# Patient Record
Sex: MALE | Race: WHITE | NOT HISPANIC OR LATINO | Employment: OTHER | URBAN - METROPOLITAN AREA
[De-identification: names, ages, dates, MRNs, and addresses within clinical notes are randomized per-mention and may not be internally consistent; named-entity substitution may affect disease eponyms.]

---

## 2018-10-08 ENCOUNTER — HOSPITAL ENCOUNTER (EMERGENCY)
Facility: HOSPITAL | Age: 53
Discharge: HOME | End: 2018-10-08
Attending: EMERGENCY MEDICINE
Payer: COMMERCIAL

## 2018-10-08 VITALS
TEMPERATURE: 98 F | DIASTOLIC BLOOD PRESSURE: 103 MMHG | HEIGHT: 70 IN | WEIGHT: 145 LBS | OXYGEN SATURATION: 94 % | HEART RATE: 78 BPM | BODY MASS INDEX: 20.76 KG/M2 | RESPIRATION RATE: 15 BRPM | SYSTOLIC BLOOD PRESSURE: 148 MMHG

## 2018-10-08 DIAGNOSIS — R55 SYNCOPE AND COLLAPSE: ICD-10-CM

## 2018-10-08 DIAGNOSIS — T67.1XXA HEAT SYNCOPE, INITIAL ENCOUNTER: Primary | ICD-10-CM

## 2018-10-08 DIAGNOSIS — E86.0 DEHYDRATION: ICD-10-CM

## 2018-10-08 DIAGNOSIS — R03.0 ELEVATED BLOOD PRESSURE READING: ICD-10-CM

## 2018-10-08 DIAGNOSIS — R52 PAIN: ICD-10-CM

## 2018-10-08 LAB
ALBUMIN SERPL BCP-MCNC: 3.6 G/DL
ALP SERPL-CCNC: 97 U/L
ALT SERPL W/O P-5'-P-CCNC: 25 U/L
ANION GAP SERPL CALC-SCNC: 7 MMOL/L
AST SERPL-CCNC: 32 U/L
BASOPHILS # BLD AUTO: 0.05 K/UL
BASOPHILS NFR BLD: 0.5 %
BILIRUB SERPL-MCNC: 0.5 MG/DL
BILIRUB UR QL STRIP: NEGATIVE
BUN SERPL-MCNC: 12 MG/DL
CALCIUM SERPL-MCNC: 8.3 MG/DL
CHLORIDE SERPL-SCNC: 104 MMOL/L
CK SERPL-CCNC: 413 U/L
CLARITY UR: CLEAR
CO2 SERPL-SCNC: 23 MMOL/L
COLOR UR: YELLOW
CREAT SERPL-MCNC: 0.8 MG/DL
DIFFERENTIAL METHOD: ABNORMAL
EOSINOPHIL # BLD AUTO: 0 K/UL
EOSINOPHIL NFR BLD: 0.4 %
ERYTHROCYTE [DISTWIDTH] IN BLOOD BY AUTOMATED COUNT: 12.2 %
EST. GFR  (AFRICAN AMERICAN): >60 ML/MIN/1.73 M^2
EST. GFR  (NON AFRICAN AMERICAN): >60 ML/MIN/1.73 M^2
GLUCOSE SERPL-MCNC: 103 MG/DL
GLUCOSE UR QL STRIP: NEGATIVE
HCT VFR BLD AUTO: 39.4 %
HGB BLD-MCNC: 13.6 G/DL
HGB UR QL STRIP: ABNORMAL
IMM GRANULOCYTES # BLD AUTO: 0.04 K/UL
IMM GRANULOCYTES NFR BLD AUTO: 0.4 %
KETONES UR QL STRIP: NEGATIVE
LEUKOCYTE ESTERASE UR QL STRIP: NEGATIVE
LYMPHOCYTES # BLD AUTO: 0.9 K/UL
LYMPHOCYTES NFR BLD: 9.5 %
MAGNESIUM SERPL-MCNC: 2 MG/DL
MCH RBC QN AUTO: 32.3 PG
MCHC RBC AUTO-ENTMCNC: 34.5 G/DL
MCV RBC AUTO: 94 FL
MONOCYTES # BLD AUTO: 0.6 K/UL
MONOCYTES NFR BLD: 6.2 %
NEUTROPHILS # BLD AUTO: 8.2 K/UL
NEUTROPHILS NFR BLD: 83 %
NITRITE UR QL STRIP: NEGATIVE
NRBC BLD-RTO: 0 /100 WBC
PH UR STRIP: 5 [PH] (ref 5–8)
PLATELET # BLD AUTO: 232 K/UL
PMV BLD AUTO: 9.9 FL
POTASSIUM SERPL-SCNC: 3.5 MMOL/L
PROT SERPL-MCNC: 6.7 G/DL
PROT UR QL STRIP: NEGATIVE
RBC # BLD AUTO: 4.21 M/UL
SODIUM SERPL-SCNC: 134 MMOL/L
SP GR UR STRIP: <=1.005 (ref 1–1.03)
URN SPEC COLLECT METH UR: ABNORMAL
UROBILINOGEN UR STRIP-ACNC: NEGATIVE EU/DL
WBC # BLD AUTO: 9.89 K/UL

## 2018-10-08 PROCEDURE — 96361 HYDRATE IV INFUSION ADD-ON: CPT

## 2018-10-08 PROCEDURE — 73030 X-RAY EXAM OF SHOULDER: CPT | Mod: 26,LT,, | Performed by: RADIOLOGY

## 2018-10-08 PROCEDURE — 99284 EMERGENCY DEPT VISIT MOD MDM: CPT | Mod: 25

## 2018-10-08 PROCEDURE — 85025 COMPLETE CBC W/AUTO DIFF WBC: CPT

## 2018-10-08 PROCEDURE — 70450 CT HEAD/BRAIN W/O DYE: CPT | Mod: 26,,, | Performed by: RADIOLOGY

## 2018-10-08 PROCEDURE — 82550 ASSAY OF CK (CPK): CPT

## 2018-10-08 PROCEDURE — 70450 CT HEAD/BRAIN W/O DYE: CPT | Mod: TC

## 2018-10-08 PROCEDURE — 93005 ELECTROCARDIOGRAM TRACING: CPT

## 2018-10-08 PROCEDURE — 25000003 PHARM REV CODE 250: Performed by: EMERGENCY MEDICINE

## 2018-10-08 PROCEDURE — 83735 ASSAY OF MAGNESIUM: CPT

## 2018-10-08 PROCEDURE — 80053 COMPREHEN METABOLIC PANEL: CPT

## 2018-10-08 PROCEDURE — 96360 HYDRATION IV INFUSION INIT: CPT

## 2018-10-08 PROCEDURE — 73030 X-RAY EXAM OF SHOULDER: CPT | Mod: TC,FY,LT

## 2018-10-08 PROCEDURE — 81003 URINALYSIS AUTO W/O SCOPE: CPT

## 2018-10-08 RX ORDER — SODIUM CHLORIDE 9 MG/ML
1000 INJECTION, SOLUTION INTRAVENOUS
Status: COMPLETED | OUTPATIENT
Start: 2018-10-08 | End: 2018-10-08

## 2018-10-08 RX ORDER — AMLODIPINE BESYLATE 5 MG/1
5 TABLET ORAL DAILY
Qty: 30 TABLET | Refills: 2 | Status: SHIPPED | OUTPATIENT
Start: 2018-10-08 | End: 2019-10-08

## 2018-10-08 RX ORDER — AMLODIPINE BESYLATE 2.5 MG/1
5 TABLET ORAL
Status: DISCONTINUED | OUTPATIENT
Start: 2018-10-08 | End: 2018-10-08 | Stop reason: HOSPADM

## 2018-10-08 RX ADMIN — SODIUM CHLORIDE 1000 ML: 9 INJECTION, SOLUTION INTRAVENOUS at 03:10

## 2018-10-08 NOTE — DISCHARGE INSTRUCTIONS
CONTINUE current medicines as perscribed TYLENOL and /or MOTRIN for fever and pain as needed  PRESCRIPTIONS should be filled ASAP and take prescriptions as ordered.   PCP follow up ASAP/ or as needed.     Begin Norvasc 5mg daily     Follow up later in the week with Dr Izquierdo    ER if any acute worsening

## 2018-10-08 NOTE — ED NOTES
"Pt here s/p falling to ground with "near syncopal" episode while building a house/carpenty. Pt with gcs of 14 on arrival to ed. Attempting to recall events of the day   "

## 2018-10-08 NOTE — ED NOTES
Patient discharge has been delayed due to pt with increased pain to left shoulder xray ordered and completed waiting on results

## 2018-10-08 NOTE — ED PROVIDER NOTES
Encounter Date: 10/8/2018       History     Chief Complaint   Patient presents with    Seizures     53-year-old white male here with complaint of syncopal event while at work this afternoon.  This patient states that he started to vaguely remember what happened now he thinks that as he turned instead did at that he passed out.  This happened to him once before in 2001 he does work outside in the heat.  Patient complains of vague headache on the left side and being dizzy.          Review of patient's allergies indicates:  No Known Allergies  No past medical history on file.  History reviewed. No pertinent surgical history.  History reviewed. No pertinent family history.  Social History     Tobacco Use    Smoking status: Never Smoker    Smokeless tobacco: Never Used   Substance Use Topics    Alcohol use: Not on file    Drug use: No     Review of Systems   Neurological: Positive for dizziness and syncope.   All other systems reviewed and are negative.      Physical Exam     Initial Vitals [10/08/18 1445]   BP Pulse Resp Temp SpO2   (!) 120/94 (!) 120 20 98.1 °F (36.7 °C) (!) 89 %      MAP       --         Physical Exam    Nursing note and vitals reviewed.  Constitutional: He appears well-developed and well-nourished.   HENT:   Head: Normocephalic.   Right Ear: External ear normal.   Left Ear: External ear normal.   Nose: Nose normal.   Mouth/Throat: Oropharynx is clear and moist.   Eyes: Conjunctivae and EOM are normal. Pupils are equal, round, and reactive to light.   Neck: Normal range of motion. Neck supple.   Cardiovascular: Normal rate, regular rhythm, normal heart sounds and intact distal pulses.   No murmur heard.  Pulmonary/Chest: Breath sounds normal. He has no wheezes. He has no rhonchi. He has no rales.   Abdominal: Soft. Bowel sounds are normal. He exhibits no distension and no mass. There is no tenderness. There is no rebound.   Musculoskeletal: Normal range of motion.   Neurological: He is alert and  oriented to person, place, and time. He has normal strength and normal reflexes. He displays normal reflexes. No cranial nerve deficit.   Skin: Skin is warm and dry. Capillary refill takes less than 2 seconds.   Psychiatric: He has a normal mood and affect. His behavior is normal. Judgment and thought content normal.         ED Course   Procedures  Labs Reviewed - No data to display  EKG Readings: (Independently Interpreted)   Rhythm: Normal Sinus Rhythm. Heart Rate: 109. Ectopy: No Ectopy. Conduction: Normal. ST Segments: Normal ST Segments. T Waves: Normal. Clinical Impression: Normal Sinus Rhythm and Sinus Tachycardia       Imaging Results          CT Head Without Contrast (Final result)  Result time 10/08/18 15:19:34    Final result by Tommy De La O MD (10/08/18 15:19:34)                 Impression:      1. Cortical atrophy with periventricular deep white matter change consistent with chronic small vessel ischemic disease.  This is considered advanced for a patient of this age group.  Correlate clinically with past medical history for hypertension and/or diabetes.  2. Small focus of remote ischemia involving the deep white matter of the right frontal lobe.  3. Mild ethmoid sinus disease.      Electronically signed by: Tommy De La O  Date:    10/08/2018  Time:    15:19             Narrative:    EXAMINATION:  CT HEAD WITHOUT CONTRAST    CLINICAL HISTORY:  Dizziness;    TECHNIQUE:  Low dose axial images were obtained through the head.  Coronal and sagittal reformations were also performed. Contrast was not administered.    COMPARISON:  None.    FINDINGS:  There is no acute hemorrhage or infarction.  There is cortical atrophy.  There are periventricular deep white matter changes consistent with chronic small vessel ischemic disease.  7 mm focus of hypoattenuation within the deep white matter of the right frontal lobe consistent with a small focus of remote ischemia.    No extra-axial fluid collections.   Ventricles are normal in size, shape and configuration.  The basal cisterns are patent.    The imaged paranasal sinuses are well aerated.  Scattered mucoperiosteal thickening within the right greater than left ethmoid air cells.    The mastoid air cells and middle ears are normally pneumatized.                                 Medical Decision Making:   ED Management:  Patient has  improved with treatment in the emergency department and comfortable going home.I have discussed reasons to return and importance of followup.  Patient understands that the emergency visit today is primarily to address immediate concerns and to rule out emergent cause of symptoms and that they may require further workup and evaluation as an outpatient. All questions addressed and patient given discharge instructions and followup information.                    ED Course as of Oct 11 0620   Mon Oct 08, 2018   1714 3v left shoulder reveal normal bony anatomy no fracture dislocation    KLG  [KG]      ED Course User Index  [KG] Fritz Michel MD     Clinical Impression:   The primary encounter diagnosis was Heat syncope, initial encounter. A diagnosis of Dehydration was also pertinent to this visit.                             Lan Kirkland MD  10/08/18 1631       Lan Kirkland MD  10/11/18 0620

## 2018-10-08 NOTE — ED NOTES
Patient discharge has been delayed due to dr mcneill into examine pt s/p xray, spouse requesting blood work on pt pt placed back on cardiac monitor and plan to evaluate labs

## 2018-10-09 NOTE — ED NOTES
DISCHARGE INSTRUCTIONS GIVEN, DISCUSSED MEDICATIONS AND FOLLOW UP CARE, PATIENT VERBALIZED UNDERSTANDING, NO QUESTIONS OR COMPLAINTS AT TIME, ENCOURAGED TO RETURN FOR NEW OR WORSENING SYMPTOMS. PATIENT ESCORTED TO REGISTRATION W/O ROSIE. IRIS SALVADOR RN

## 2018-10-22 ENCOUNTER — TELEPHONE (OUTPATIENT)
Dept: ORTHOPEDICS | Facility: CLINIC | Age: 53
End: 2018-10-22

## 2018-10-22 NOTE — TELEPHONE ENCOUNTER
Called patient to schedule referred appointment from Dr. Izquierdo with Dr. Mcallister for treatment of left shoulder pain. Appointment made and patient voiced understanding of appointment date, time, and location.

## 2018-10-31 ENCOUNTER — OFFICE VISIT (OUTPATIENT)
Dept: ORTHOPEDICS | Facility: CLINIC | Age: 53
End: 2018-10-31
Payer: COMMERCIAL

## 2018-10-31 VITALS
HEART RATE: 115 BPM | BODY MASS INDEX: 20.77 KG/M2 | HEIGHT: 70 IN | SYSTOLIC BLOOD PRESSURE: 137 MMHG | WEIGHT: 145.06 LBS | DIASTOLIC BLOOD PRESSURE: 100 MMHG

## 2018-10-31 DIAGNOSIS — M75.100 TEAR OF ROTATOR CUFF, UNSPECIFIED LATERALITY, UNSPECIFIED TEAR EXTENT: Primary | ICD-10-CM

## 2018-10-31 DIAGNOSIS — M19.012 ARTHRITIS OF LEFT ACROMIOCLAVICULAR JOINT: ICD-10-CM

## 2018-10-31 DIAGNOSIS — M75.122 COMPLETE TEAR OF LEFT ROTATOR CUFF: Primary | ICD-10-CM

## 2018-10-31 PROCEDURE — 99204 OFFICE O/P NEW MOD 45 MIN: CPT | Mod: 25,S$GLB,, | Performed by: ORTHOPAEDIC SURGERY

## 2018-10-31 PROCEDURE — 99999 PR PBB SHADOW E&M-EST. PATIENT-LVL III: CPT | Mod: PBBFAC,,, | Performed by: ORTHOPAEDIC SURGERY

## 2018-10-31 PROCEDURE — 20610 DRAIN/INJ JOINT/BURSA W/O US: CPT | Mod: LT,S$GLB,, | Performed by: ORTHOPAEDIC SURGERY

## 2018-10-31 RX ORDER — PANTOPRAZOLE SODIUM 40 MG/1
TABLET, DELAYED RELEASE ORAL
COMMUNITY
Start: 2018-10-09

## 2018-10-31 RX ADMIN — TRIAMCINOLONE ACETONIDE 40 MG: 40 INJECTION, SUSPENSION INTRA-ARTICULAR; INTRAMUSCULAR at 02:10

## 2018-11-01 PROBLEM — M75.122 COMPLETE TEAR OF LEFT ROTATOR CUFF: Status: ACTIVE | Noted: 2018-11-01

## 2018-11-01 PROBLEM — M19.012 ARTHRITIS OF LEFT ACROMIOCLAVICULAR JOINT: Status: ACTIVE | Noted: 2018-11-01

## 2018-11-01 RX ORDER — TRIAMCINOLONE ACETONIDE 40 MG/ML
40 INJECTION, SUSPENSION INTRA-ARTICULAR; INTRAMUSCULAR
Status: DISCONTINUED | OUTPATIENT
Start: 2018-10-31 | End: 2018-11-01 | Stop reason: HOSPADM

## 2018-11-01 NOTE — PROGRESS NOTES
Subjective:      Patient ID: Drew Kaur is a 53 y.o. male.    Chief Complaint: Pain of the Left Shoulder    Referring Provider: No referring provider defined for this encounter.    HPI:  Mr. Kaur is a 53-year-old right-hand-dominant male who presented today with complaints of 3 weeks of left shoulder pain and decreased motion after he blacked out and when he awoke he had shoulder pain and loss of motion.  His date of injury 10/08/2018.  Attempts at abduction increases symptoms while nothing improves it.  His symptoms awaken him at night.  He has taken NSAIDs without help.  He has not done physical therapy nor had injections.    Past Medical History:   Diagnosis Date    GERD (gastroesophageal reflux disease)      *  *  * Hypertension  Seasonal allergies  Mini strokes  Headache      Past Surgical History:   Procedure Laterality Date     *  * EYE SURGERY  SKIN CANCER REMOVAL FROM UNDER RIGHT EYE  INCOMPLETE AMPUTATION LEFT INDEX FINGER         Review of patient's allergies indicates:  No Known Allergies    Social History     Occupational History         Tobacco Use    Smoking status: Never Smoker    Smokeless tobacco: Never Used   Substance and Sexual Activity    Alcohol use: Yes     Frequency: Never     Comment: Socially    Drug use: No    Sexual activity: Not Currently      Family History   Problem Relation Age of Onset    Diabetes Mother     Hypertension Mother     Stroke Father     Heart attack Father     No Known Problems Sister     Hypertension Brother     No Known Problems Sister     No Known Problems Sister     Arthritis Brother     No Known Problems Brother        Previous Hospitalizations:  Denied previous hospitalizations.    ROS:   Review of Systems   Constitution: Negative for chills and fever.   HENT: Negative for congestion.    Eyes: Negative for blurred vision.   Cardiovascular: Negative for chest pain.   Respiratory: Negative for cough.    Endocrine:  Negative for polydipsia.   Skin: Positive for skin cancer.   Gastrointestinal: Negative for constipation and diarrhea.   Genitourinary: Negative for dysuria.   Neurological: Negative for numbness.   Psychiatric/Behavioral: Negative for substance abuse.   Allergic/Immunologic: Positive for environmental allergies.           Objective:      Physical Exam:   General: AAOx3.  No acute distress  HEENT: Normocephalic, PEARLA EOMI, Good Dentition  Neck: Supple, No JVD  Chest: Symetric, equal excursion on inspiration  Abdomen: Soft NTND  Vascular:  Pulses intact and equal bilaterally.  Capillary refill less than 3 seconds and equal bilaterally  Neurologic:  Pinprick and soft touch intact and equal bilaterally  Integment:  No ecchymosis, no errythema  Extremity:  Shoulder:  Forward flexion/abduction right shoulder 0/179 degrees, left shoulder 0/75°.  Internal rotation right shoulder L1, left shoulder L5.  Negative lift-off bilaterally. External rotation equal bilaterally 0/15°.  Full can positive left shoulder.  Empty can positive left shoulder.  Mild drop-arm left shoulder.  Gutierrez/Neer positive left shoulder.  Cross-arm negative both shoulders.  Nontender over the acromioclavicular joint both shoulders.  Mild tenderness in the bicipital groove left shoulder.  Yergason's negative bilaterally. Apprehension/relocation negative bilaterally.  Radiography:  Personally reviewed x-rays of the left shoulder completed on 10/08/2018 which showed elevation of the humerus in the glenoid with AC arthritis and a type 3 acromion.      Assessment:       Impression:      1. Complete tear of left rotator cuff    2. Arthritis of left acromioclavicular joint          Plan:       1.  Discussed physical examination and radiographic findings with the patient. Drew understands that he appears to have a tear of his rotator cuff and in order to fully evaluated would like to forward him for an MRI.  He also understands that most likely surgery  will be recommended for his shoulder.  2.  Refer for an MRI of the left shoulder.  3.  Final recommendations after MRI is completed.  4.  To help palliate some of his pain offered a steroid injection to the left shoulder, he elected to proceed.  5.  One-handed activities with the right hand only.  6.  Will hold off on referral to physical therapy as he will need it for postoperative convalescence.  7.  Home exercises to include Codman exercises, wall walking exercises, towel exercises, and cane exercises were shown discussed.  8.  Any minor pain can be treated with over-the-counter medications dosed per box instructions.  9.  Follow up after MRI is completed.

## 2018-11-01 NOTE — PROCEDURES
Large Joint Aspiration/Injection: L glenohumeral  Date/Time: 10/31/2018 2:15 PM  Performed by: Wyatt Mcallister DO  Authorized by: Wyatt Mcallister, DO     Consent Done?:  Yes (Verbal)  Indications:  Pain and diagnostic evaluation  Procedure site marked: Yes    Timeout: Prior to procedure the correct patient, procedure, and site was verified      Location:  Shoulder  Site:  L glenohumeral  Prep: Patient was prepped and draped in usual sterile fashion    Ultrasonic Guidance for needle placement: No  Needle size:  22 G  Approach:  Posterior  Medications:  40 mg triamcinolone acetonide 40 mg/mL  Patient tolerance:  Patient tolerated the procedure well with no immediate complications

## 2018-11-07 ENCOUNTER — HOSPITAL ENCOUNTER (OUTPATIENT)
Dept: RADIOLOGY | Facility: HOSPITAL | Age: 53
Discharge: HOME | End: 2018-11-07
Attending: ORTHOPAEDIC SURGERY
Payer: COMMERCIAL

## 2018-11-07 DIAGNOSIS — M75.100 TEAR OF ROTATOR CUFF, UNSPECIFIED LATERALITY, UNSPECIFIED TEAR EXTENT: ICD-10-CM

## 2018-11-07 PROCEDURE — 73222 MRI JOINT UPR EXTREM W/DYE: CPT | Mod: TC,LT

## 2018-11-07 PROCEDURE — 73040 CONTRAST X-RAY OF SHOULDER: CPT | Mod: TC

## 2018-11-07 PROCEDURE — 73040 CONTRAST X-RAY OF SHOULDER: CPT | Mod: 26,LT,, | Performed by: RADIOLOGY

## 2018-11-07 PROCEDURE — 25500020 PHARM REV CODE 255

## 2018-11-07 PROCEDURE — 23350 INJECTION FOR SHOULDER X-RAY: CPT | Mod: LT,,, | Performed by: RADIOLOGY

## 2018-11-07 PROCEDURE — A9577 INJ MULTIHANCE: HCPCS

## 2018-11-07 PROCEDURE — 73222 MRI JOINT UPR EXTREM W/DYE: CPT | Mod: 26,LT,, | Performed by: RADIOLOGY

## 2018-11-07 PROCEDURE — 23350 INJECTION FOR SHOULDER X-RAY: CPT

## 2018-11-07 PROCEDURE — 25000003 PHARM REV CODE 250

## 2018-11-07 RX ORDER — LIDOCAINE HYDROCHLORIDE 10 MG/ML
INJECTION, SOLUTION EPIDURAL; INFILTRATION; INTRACAUDAL; PERINEURAL
Status: COMPLETED
Start: 2018-11-07 | End: 2018-11-07

## 2018-11-07 RX ADMIN — GADOBENATE DIMEGLUMINE 0.15 ML: 529 INJECTION, SOLUTION INTRAVENOUS at 01:11

## 2018-11-07 RX ADMIN — LIDOCAINE HYDROCHLORIDE 5 ML: 10 INJECTION, SOLUTION EPIDURAL; INFILTRATION; INTRACAUDAL; PERINEURAL at 01:11

## 2018-11-07 RX ADMIN — IOHEXOL 10 ML: 350 INJECTION, SOLUTION INTRAVENOUS at 01:11

## 2018-11-14 ENCOUNTER — OFFICE VISIT (OUTPATIENT)
Dept: ORTHOPEDICS | Facility: CLINIC | Age: 53
End: 2018-11-14
Payer: COMMERCIAL

## 2018-11-14 VITALS — WEIGHT: 145.06 LBS | HEIGHT: 70 IN | BODY MASS INDEX: 20.77 KG/M2

## 2018-11-14 DIAGNOSIS — M75.122 COMPLETE TEAR OF LEFT ROTATOR CUFF: Primary | ICD-10-CM

## 2018-11-14 DIAGNOSIS — M19.012 ARTHRITIS OF LEFT ACROMIOCLAVICULAR JOINT: ICD-10-CM

## 2018-11-14 PROCEDURE — 99213 OFFICE O/P EST LOW 20 MIN: CPT | Mod: S$GLB,,, | Performed by: ORTHOPAEDIC SURGERY

## 2018-11-14 PROCEDURE — 99999 PR PBB SHADOW E&M-EST. PATIENT-LVL II: CPT | Mod: PBBFAC,,, | Performed by: ORTHOPAEDIC SURGERY

## 2018-11-14 NOTE — PROGRESS NOTES
Subjective:      Patient ID: Drew Kaur is a 53 y.o. male.    Chief Complaint: Results (MRI Results of Left Shoulder)    HPI: Mr. Kaur returns today for review of MRI of his left shoulder.  At his last visit on 10/31/2018 he appeared to have ruptured the rotator cuff after he had a blackout episode and fell.  He stated that his shoulder is feeling much better today and he has regained a lot of his motion. His pain is well controlled.    ROS:  No new diagnosis/surgery/prescriptions since last office visit on 10/31/2018.  Constitution: Negative for chills and fever.   HENT: Negative for congestion.    Eyes: Negative for blurred vision.   Cardiovascular: Negative for chest pain.   Respiratory: Negative for cough.    Endocrine: Negative for polydipsia.   Skin: Positive for skin cancer.   Gastrointestinal: Negative for constipation and diarrhea.   Genitourinary: Negative for dysuria.   Neurological: Negative for numbness.   Psychiatric/Behavioral: Negative for substance abuse.   Allergic/Immunologic: Positive for environmental allergies.       Objective:      Physical Exam:   General: AAOx3.  No acute distress  Vascular:  Pulses intact and equal bilaterally.  Capillary refill less than 3 seconds and equal bilaterally  Neurologic:  Pinprick and soft touch intact and equal bilaterally  Integment:  No ecchymosis, no errythema  Extremity:  Shoulder:  Forward flexion/abduction equal bilaterally 0/175°.  Internal rotation equal bilaterally L1.  External rotation equal bilaterally 0/15 degrees. Full can negative both shoulders.  Empty can positive left shoulder.  Gutierrez/Neer mildly positive left shoulder.  Nontender in the bicipital groove both shoulders.  Apprehension/relocation negative both shoulders.  Radiography:  Personally reviewed MRI of the left shoulder completed on 11/07/2018 which showed a full-thickness retracted tear of the rotator cuff and AC arthritis.      Assessment:       Impression:   1.  Rotator  cuff tear, left shoulder.  2.  AC arthritis, left shoulder.      Plan:       1.  Discussed physical examination and radiographic findings with the patient. Drew understands that he has a full-thickness tear of the rotator cuff of his left shoulder and typical recommendations are for surgical repair. The patient understands that is typically a 3 to four-month process. Discussed in detail with the patient surgery and offered him to proceed with surgery. The patient declined stating that he could not take off work to proceed with surgery. He stated he is doing well and would prefer to try other treatment modalities.  Explained to the patient that the longer he waits the harder it is to do a rotator cuff repair and over time he could develop a rotator cuff arthropathy/arthritis of his shoulder.  He stated he would prefer to continue with conservative measures.  2.  Offered to proceed with surgery he declined.  3.  Refer to physical therapy/occupational therapy.  4.  Home exercises were reinforced with the patient to include rotator cuff and deltoid incorporation exercises.  5.  Any pain can be controlled with over-the-counter medications dosed per box instructions.  6.  Follow up in 6 weeks for re-evaluation will really entertain rotator cuff repair again at that time.

## 2019-05-02 ENCOUNTER — HOSPITAL ENCOUNTER (EMERGENCY)
Facility: HOSPITAL | Age: 54
Discharge: HOME | End: 2019-05-02
Attending: INTERNAL MEDICINE
Payer: COMMERCIAL

## 2019-05-02 VITALS
BODY MASS INDEX: 20.04 KG/M2 | TEMPERATURE: 98 F | HEART RATE: 98 BPM | HEIGHT: 70 IN | RESPIRATION RATE: 20 BRPM | DIASTOLIC BLOOD PRESSURE: 81 MMHG | WEIGHT: 140 LBS | SYSTOLIC BLOOD PRESSURE: 124 MMHG

## 2019-05-02 DIAGNOSIS — S41.111A LACERATION OF ARM, RIGHT, COMPLICATED, INITIAL ENCOUNTER: Primary | ICD-10-CM

## 2019-05-02 DIAGNOSIS — S61.412A LACERATION OF LEFT HAND WITHOUT FOREIGN BODY, INITIAL ENCOUNTER: ICD-10-CM

## 2019-05-02 PROCEDURE — 99284 EMERGENCY DEPT VISIT MOD MDM: CPT | Mod: 25

## 2019-05-02 PROCEDURE — 25000003 PHARM REV CODE 250: Performed by: PHYSICIAN ASSISTANT

## 2019-05-02 PROCEDURE — 73130 X-RAY EXAM OF HAND: CPT | Mod: 26,LT,, | Performed by: RADIOLOGY

## 2019-05-02 PROCEDURE — 73130 XR HAND COMPLETE 3 VIEW LEFT: ICD-10-PCS | Mod: 26,LT,, | Performed by: RADIOLOGY

## 2019-05-02 PROCEDURE — 12004 RPR S/N/AX/GEN/TRK7.6-12.5CM: CPT

## 2019-05-02 PROCEDURE — 12001 RPR S/N/AX/GEN/TRNK 2.5CM/<: CPT | Mod: 59,LT

## 2019-05-02 PROCEDURE — 12032 INTMD RPR S/A/T/EXT 2.6-7.5: CPT | Mod: RT

## 2019-05-02 PROCEDURE — 73130 X-RAY EXAM OF HAND: CPT | Mod: TC,FY,LT

## 2019-05-02 PROCEDURE — 63600175 PHARM REV CODE 636 W HCPCS: Performed by: PHYSICIAN ASSISTANT

## 2019-05-02 PROCEDURE — 96372 THER/PROPH/DIAG INJ SC/IM: CPT

## 2019-05-02 RX ORDER — LIDOCAINE HYDROCHLORIDE 10 MG/ML
5 INJECTION INFILTRATION; PERINEURAL
Status: COMPLETED | OUTPATIENT
Start: 2019-05-02 | End: 2019-05-02

## 2019-05-02 RX ORDER — CEPHALEXIN 500 MG/1
500 CAPSULE ORAL 4 TIMES DAILY
Qty: 20 CAPSULE | Refills: 0 | Status: SHIPPED | OUTPATIENT
Start: 2019-05-02 | End: 2019-05-07

## 2019-05-02 RX ORDER — CEFTRIAXONE 1 G/1
1 INJECTION, POWDER, FOR SOLUTION INTRAMUSCULAR; INTRAVENOUS
Status: COMPLETED | OUTPATIENT
Start: 2019-05-02 | End: 2019-05-02

## 2019-05-02 RX ADMIN — CEFTRIAXONE SODIUM 1 G: 1 INJECTION, POWDER, FOR SOLUTION INTRAMUSCULAR; INTRAVENOUS at 06:05

## 2019-05-02 RX ADMIN — LIDOCAINE HYDROCHLORIDE 5 ML: 10 INJECTION, SOLUTION INFILTRATION; PERINEURAL at 05:05

## 2019-05-02 NOTE — ED NOTES
Wound irrigated with 500ml NS/Betadine solution to right forearm due to depth of laceration.  Provider at bedside for wound repair.  After suture/staples, wound clean and dressing applied.

## 2019-05-02 NOTE — ED PROVIDER NOTES
Encounter Date: 5/2/2019       History     Chief Complaint   Patient presents with    Laceration     right forearm and left index finger, cut with      Patient presents to ER with laceration to right forearm as well as left dorsal portion of the hand.  Patient states was cut with a  did not realize it was still on and when he planted in the  cut his forearm when he went to grab the  it hit the top of his left hand over the 1st knuckle of the 1st finger.  Patient states is in mild pain is sharp in nature does not radiate stated is not tried any medications over-the-counter for pain prior to coming in.  Patient denied ever having this type of an injury. Patient stated is up-to-date on his tetanus stated has been less than 5 years since last booster.  Stated the injury happened while at home.    The history is provided by the patient.     Review of patient's allergies indicates:  No Known Allergies  Past Medical History:   Diagnosis Date    GERD (gastroesophageal reflux disease)     Hypertension      Past Surgical History:   Procedure Laterality Date    EYE SURGERY       Family History   Problem Relation Age of Onset    Diabetes Mother     Hypertension Mother     Stroke Father     Heart attack Father     No Known Problems Sister     Hypertension Brother     No Known Problems Sister     No Known Problems Sister     Arthritis Brother     No Known Problems Brother      Social History     Tobacco Use    Smoking status: Never Smoker    Smokeless tobacco: Never Used   Substance Use Topics    Alcohol use: Yes     Frequency: Never     Comment: Socially    Drug use: No     Review of Systems   Constitutional: Negative for chills, diaphoresis and fever.   Respiratory: Negative for shortness of breath.    Cardiovascular: Negative for chest pain.   Gastrointestinal: Negative for nausea and vomiting.   Musculoskeletal: Negative for gait problem.   Skin: Positive for wound (Laceration to  right forearm and left hand). Negative for rash.   Neurological: Negative for syncope, weakness, light-headedness and numbness.   All other systems reviewed and are negative.      Physical Exam     Initial Vitals [05/02/19 1709]   BP Pulse Resp Temp SpO2   124/81 98 20 98.3 °F (36.8 °C) --      MAP       --         Physical Exam    Nursing note and vitals reviewed.  Constitutional: He appears well-developed and well-nourished. He is not diaphoretic. No distress.   HENT:   Head: Atraumatic.   Eyes: Right eye exhibits no discharge. Left eye exhibits no discharge.   Neck: Normal range of motion.   Cardiovascular: Normal rate, regular rhythm and intact distal pulses.   Pulmonary/Chest: No respiratory distress.   Musculoskeletal: Normal range of motion. He exhibits tenderness (Pain surrounding laceration to right forearm as well as left hand moderate swelling just distal to the laceration on investigation patient cut into the fascial plane of the forearm and exposed muscle is visible).        Arms:  Neurological: He is alert and oriented to person, place, and time. GCS score is 15. GCS eye subscore is 4. GCS verbal subscore is 5. GCS motor subscore is 6.   Skin: Skin is warm and dry. Capillary refill takes less than 2 seconds.   Psychiatric: He has a normal mood and affect. Thought content normal.         ED Course   Lac Repair  Date/Time: 5/2/2019 6:20 PM  Performed by: TERESITA Benitez  Authorized by: Geraldo Back MD   Consent Done: Yes  Consent: Verbal consent obtained.  Risks and benefits: risks, benefits and alternatives were discussed  Consent given by: patient  Patient understanding: patient states understanding of the procedure being performed  Body area: upper extremity  Location details: right lower arm  Laceration length: 6 cm  Foreign bodies: no foreign bodies  Tendon involvement: none  Nerve involvement: none  Vascular damage: no  Anesthesia: local infiltration    Anesthesia:  Local  Anesthetic: lidocaine 1% without epinephrine  Anesthetic total: 4 mL  Patient sedated: no  Preparation: Patient was prepped and draped in the usual sterile fashion.  Irrigation solution: Copious irrigation with saline and Betadine solution.  Irrigation method: jet lavage  Amount of cleaning: extensive  Debridement: none  Degree of undermining: extensive (Extends to fascial plane)  Skin closure: 3-0 nylon  Fascia closure: 4-0 Vicryl  Number of sutures: 10 (Six external for internal)  Technique: simple  Approximation: close  Approximation difficulty: simple  Dressing: non-stick sterile dressing  Patient tolerance: Patient tolerated the procedure well with no immediate complications    Lac Repair  Date/Time: 5/2/2019 6:24 PM  Performed by: TERESITA Benitez  Authorized by: Geraldo Back MD   Consent Done: Yes  Consent: Verbal consent obtained.  Risks and benefits: risks, benefits and alternatives were discussed  Consent given by: patient  Patient understanding: patient states understanding of the procedure being performed  Body area: upper extremity  Location details: left hand  Laceration length: 2.5 cm  Foreign bodies: no foreign bodies  Tendon involvement: none  Nerve involvement: none  Vascular damage: no  Anesthesia: local infiltration    Anesthesia:  Local Anesthetic: lidocaine 1% without epinephrine  Anesthetic total: 2 mL  Patient sedated: no  Preparation: Patient was prepped and draped in the usual sterile fashion.  Irrigation solution: saline (Saline with Betadine)  Amount of cleaning: standard  Debridement: none  Degree of undermining: none  Skin closure: 4-0 nylon  Number of sutures: 5  Technique: simple  Approximation: close  Approximation difficulty: simple  Dressing: non-stick sterile dressing  Patient tolerance: Patient tolerated the procedure well with no immediate complications        Labs Reviewed - No data to display       Imaging Results          X-Ray Hand 3 view Left (In process)                X-Rays:   Independently Interpreted Readings:   Other Readings:  No fractures or foreign bodies seen    Medical Decision Making:   Differential Diagnosis:   Fractures foreign bodies laceration muscle laceration                      Clinical Impression:       ICD-10-CM ICD-9-CM   1. Laceration of arm, right, complicated, initial encounter S41.111A 884.1   2. Laceration of left hand without foreign body, initial encounter S61.412A 882.0                                TERESITA Benitez  05/02/19 1835

## 2019-05-02 NOTE — DISCHARGE INSTRUCTIONS
Change Bandage starting tomorrow morning twice daily wash with soap and water with each dressing change an apply triple antibiotic with each dressing changes well. Keep wound covered if chance of getting dirty otherwise may leave wound open to air.  May take Tylenol as needed for pain. Be sure to finish all antibiotics as prescribed follow-up with primary care in 10 days for suture removal.

## 2020-02-11 ENCOUNTER — TELEPHONE (OUTPATIENT)
Dept: SURGERY | Facility: CLINIC | Age: 55
End: 2020-02-11

## 2020-02-11 NOTE — TELEPHONE ENCOUNTER
LVM for patient to return call concerning referral sent from Dr Ishmael Izquierdo to schedule consult with Dr Vazquez.

## 2020-02-14 ENCOUNTER — TELEPHONE (OUTPATIENT)
Dept: SURGERY | Facility: CLINIC | Age: 55
End: 2020-02-14

## 2020-02-14 NOTE — TELEPHONE ENCOUNTER
LVM for patient to return call to schedule consult with Dr Matt Vazquez for referral from Dr Izquierdo

## 2020-03-03 ENCOUNTER — TELEPHONE (OUTPATIENT)
Dept: SURGERY | Facility: CLINIC | Age: 55
End: 2020-03-03

## 2020-03-03 NOTE — TELEPHONE ENCOUNTER
LVM for patient to return call to schedule consult with Dr Matt Vazquez for referral from Dr Izquierdo. 3rd attempt to call.

## 2021-03-02 ENCOUNTER — OFFICE VISIT (OUTPATIENT)
Dept: CARDIOLOGY | Facility: CLINIC | Age: 56
End: 2021-03-02
Payer: COMMERCIAL

## 2021-03-02 VITALS
DIASTOLIC BLOOD PRESSURE: 76 MMHG | HEART RATE: 116 BPM | HEIGHT: 70 IN | RESPIRATION RATE: 16 BRPM | SYSTOLIC BLOOD PRESSURE: 128 MMHG | WEIGHT: 141 LBS | OXYGEN SATURATION: 98 % | BODY MASS INDEX: 20.19 KG/M2

## 2021-03-02 DIAGNOSIS — I08.0 MITRAL VALVE INSUFFICIENCY AND AORTIC VALVE INSUFFICIENCY: ICD-10-CM

## 2021-03-02 DIAGNOSIS — I63.81 RIGHT-SIDED LACUNAR INFARCTION: ICD-10-CM

## 2021-03-02 DIAGNOSIS — Z86.73 H/O: CVA (CEREBROVASCULAR ACCIDENT): ICD-10-CM

## 2021-03-02 DIAGNOSIS — R07.89 ATYPICAL CHEST PAIN: ICD-10-CM

## 2021-03-02 DIAGNOSIS — R94.39 ABNORMAL FINDING ON CARDIOVASCULAR STRESS TEST: ICD-10-CM

## 2021-03-02 DIAGNOSIS — R94.31 NONSPECIFIC ABNORMAL ELECTROCARDIOGRAM (ECG) (EKG): Primary | ICD-10-CM

## 2021-03-02 DIAGNOSIS — Q23.1 BICUSPID AORTIC VALVE: ICD-10-CM

## 2021-03-02 DIAGNOSIS — R00.0 SINUS TACHYCARDIA: ICD-10-CM

## 2021-03-02 PROBLEM — Q23.81 BICUSPID AORTIC VALVE: Status: ACTIVE | Noted: 2021-03-02

## 2021-03-02 PROBLEM — Z00.00 ANNUAL PHYSICAL EXAM: Status: ACTIVE | Noted: 2021-03-02

## 2021-03-02 PROCEDURE — 93000 ELECTROCARDIOGRAM COMPLETE: CPT | Mod: S$GLB,,, | Performed by: INTERNAL MEDICINE

## 2021-03-02 PROCEDURE — 3008F PR BODY MASS INDEX (BMI) DOCUMENTED: ICD-10-PCS | Mod: CPTII,S$GLB,, | Performed by: INTERNAL MEDICINE

## 2021-03-02 PROCEDURE — 99214 OFFICE O/P EST MOD 30 MIN: CPT | Mod: 25,S$GLB,, | Performed by: INTERNAL MEDICINE

## 2021-03-02 PROCEDURE — 93000 EKG 12-LEAD: ICD-10-PCS | Mod: S$GLB,,, | Performed by: INTERNAL MEDICINE

## 2021-03-02 PROCEDURE — 3008F BODY MASS INDEX DOCD: CPT | Mod: CPTII,S$GLB,, | Performed by: INTERNAL MEDICINE

## 2021-03-02 PROCEDURE — 1126F PR PAIN SEVERITY QUANTIFIED, NO PAIN PRESENT: ICD-10-PCS | Mod: S$GLB,,, | Performed by: INTERNAL MEDICINE

## 2021-03-02 PROCEDURE — 99214 PR OFFICE/OUTPT VISIT, EST, LEVL IV, 30-39 MIN: ICD-10-PCS | Mod: 25,S$GLB,, | Performed by: INTERNAL MEDICINE

## 2021-03-02 PROCEDURE — 1126F AMNT PAIN NOTED NONE PRSNT: CPT | Mod: S$GLB,,, | Performed by: INTERNAL MEDICINE

## 2021-03-02 RX ORDER — ASPIRIN 81 MG/1
81 TABLET ORAL DAILY
COMMUNITY

## 2021-03-02 RX ORDER — METOPROLOL SUCCINATE 25 MG/1
25 TABLET, EXTENDED RELEASE ORAL DAILY
Qty: 30 TABLET | Refills: 3 | Status: SHIPPED | OUTPATIENT
Start: 2021-03-02 | End: 2021-05-25

## 2021-03-23 ENCOUNTER — IMMUNIZATION (OUTPATIENT)
Dept: FAMILY MEDICINE | Facility: CLINIC | Age: 56
End: 2021-03-23
Payer: COMMERCIAL

## 2021-03-23 DIAGNOSIS — Z23 NEED FOR VACCINATION: Primary | ICD-10-CM

## 2021-03-23 PROCEDURE — 0011A COVID-19, MRNA, LNP-S, PF, 100 MCG/0.5 ML DOSE VACCINE: CPT | Mod: PBBFAC | Performed by: FAMILY MEDICINE

## 2021-04-20 ENCOUNTER — IMMUNIZATION (OUTPATIENT)
Dept: FAMILY MEDICINE | Facility: CLINIC | Age: 56
End: 2021-04-20
Payer: COMMERCIAL

## 2021-04-20 DIAGNOSIS — Z23 NEED FOR VACCINATION: Primary | ICD-10-CM

## 2021-04-20 PROCEDURE — 0012A COVID-19, MRNA, LNP-S, PF, 100 MCG/0.5 ML DOSE VACCINE: ICD-10-PCS | Mod: ,,, | Performed by: FAMILY MEDICINE

## 2021-04-20 PROCEDURE — 0012A COVID-19, MRNA, LNP-S, PF, 100 MCG/0.5 ML DOSE VACCINE: CPT | Mod: ,,, | Performed by: FAMILY MEDICINE

## 2021-04-20 PROCEDURE — 91301 COVID-19, MRNA, LNP-S, PF, 100 MCG/0.5 ML DOSE VACCINE: ICD-10-PCS | Mod: ,,, | Performed by: FAMILY MEDICINE

## 2021-04-20 PROCEDURE — 91301 COVID-19, MRNA, LNP-S, PF, 100 MCG/0.5 ML DOSE VACCINE: CPT | Mod: ,,, | Performed by: FAMILY MEDICINE

## 2021-04-21 DIAGNOSIS — M25.461 PAIN AND SWELLING OF RIGHT KNEE: Primary | ICD-10-CM

## 2021-04-21 DIAGNOSIS — M25.561 PAIN AND SWELLING OF RIGHT KNEE: Primary | ICD-10-CM

## 2021-05-14 ENCOUNTER — HOSPITAL ENCOUNTER (OUTPATIENT)
Dept: RADIOLOGY | Facility: HOSPITAL | Age: 56
Discharge: HOME | End: 2021-05-14
Attending: FAMILY MEDICINE
Payer: COMMERCIAL

## 2021-05-14 DIAGNOSIS — M25.461 PAIN AND SWELLING OF RIGHT KNEE: ICD-10-CM

## 2021-05-14 DIAGNOSIS — M25.561 PAIN AND SWELLING OF RIGHT KNEE: ICD-10-CM

## 2021-05-14 PROCEDURE — 73562 X-RAY EXAM OF KNEE 3: CPT | Mod: 26,RT,, | Performed by: RADIOLOGY

## 2021-05-14 PROCEDURE — 73562 XR KNEE 3 VIEW RIGHT: ICD-10-PCS | Mod: 26,RT,, | Performed by: RADIOLOGY

## 2021-05-14 PROCEDURE — 73562 X-RAY EXAM OF KNEE 3: CPT | Mod: TC,FY,RT

## 2021-06-07 PROBLEM — Z00.00 ANNUAL PHYSICAL EXAM: Status: RESOLVED | Noted: 2021-03-02 | Resolved: 2021-06-07

## 2021-09-13 ENCOUNTER — HOSPITAL ENCOUNTER (EMERGENCY)
Facility: HOSPITAL | Age: 56
Discharge: HOME | End: 2021-09-13
Attending: EMERGENCY MEDICINE
Payer: COMMERCIAL

## 2021-09-13 VITALS
WEIGHT: 145 LBS | SYSTOLIC BLOOD PRESSURE: 120 MMHG | BODY MASS INDEX: 20.76 KG/M2 | HEART RATE: 89 BPM | HEIGHT: 70 IN | TEMPERATURE: 99 F | DIASTOLIC BLOOD PRESSURE: 93 MMHG | OXYGEN SATURATION: 96 % | RESPIRATION RATE: 11 BRPM

## 2021-09-13 DIAGNOSIS — R56.9 SEIZURE-LIKE ACTIVITY: Primary | ICD-10-CM

## 2021-09-13 DIAGNOSIS — R56.9 SEIZURE: ICD-10-CM

## 2021-09-13 LAB
ALBUMIN SERPL BCP-MCNC: 3.8 G/DL (ref 3.5–5.2)
ALP SERPL-CCNC: 93 U/L (ref 55–135)
ALT SERPL W/O P-5'-P-CCNC: 26 U/L (ref 10–44)
AMPHET+METHAMPHET UR QL: NEGATIVE
ANION GAP SERPL CALC-SCNC: 17 MMOL/L (ref 8–16)
AST SERPL-CCNC: 30 U/L (ref 10–40)
BARBITURATES UR QL SCN>200 NG/ML: NEGATIVE
BASOPHILS # BLD AUTO: 0.1 K/UL (ref 0–0.2)
BASOPHILS NFR BLD: 1.3 % (ref 0–1.9)
BENZODIAZ UR QL SCN>200 NG/ML: NEGATIVE
BILIRUB SERPL-MCNC: 0.6 MG/DL (ref 0.1–1)
BILIRUB UR QL STRIP: NEGATIVE
BUN SERPL-MCNC: 10 MG/DL (ref 6–20)
BZE UR QL SCN: NEGATIVE
CALCIUM SERPL-MCNC: 9.6 MG/DL (ref 8.7–10.5)
CANNABINOIDS UR QL SCN: NEGATIVE
CHLORIDE SERPL-SCNC: 104 MMOL/L (ref 95–110)
CK SERPL-CCNC: 157 U/L (ref 20–200)
CLARITY UR: CLEAR
CO2 SERPL-SCNC: 19 MMOL/L (ref 23–29)
COLOR UR: YELLOW
CREAT SERPL-MCNC: 1.1 MG/DL (ref 0.5–1.4)
CREAT UR-MCNC: 215.9 MG/DL (ref 23–375)
DIFFERENTIAL METHOD: ABNORMAL
EOSINOPHIL # BLD AUTO: 0.2 K/UL (ref 0–0.5)
EOSINOPHIL NFR BLD: 2 % (ref 0–8)
ERYTHROCYTE [DISTWIDTH] IN BLOOD BY AUTOMATED COUNT: 12.8 % (ref 11.5–14.5)
EST. GFR  (AFRICAN AMERICAN): >60 ML/MIN/1.73 M^2
EST. GFR  (NON AFRICAN AMERICAN): >60 ML/MIN/1.73 M^2
ETHANOL SERPL-MCNC: <10 MG/DL (ref 0–10)
GLUCOSE SERPL-MCNC: 112 MG/DL (ref 70–110)
GLUCOSE UR QL STRIP: NEGATIVE
HCT VFR BLD AUTO: 39.8 % (ref 40–54)
HGB BLD-MCNC: 14.3 G/DL (ref 14–18)
HGB UR QL STRIP: NEGATIVE
IMM GRANULOCYTES # BLD AUTO: 0.03 K/UL (ref 0–0.04)
IMM GRANULOCYTES NFR BLD AUTO: 0.4 % (ref 0–0.5)
KETONES UR QL STRIP: ABNORMAL
LEUKOCYTE ESTERASE UR QL STRIP: NEGATIVE
LYMPHOCYTES # BLD AUTO: 1.1 K/UL (ref 1–4.8)
LYMPHOCYTES NFR BLD: 14.5 % (ref 18–48)
MCH RBC QN AUTO: 33.7 PG (ref 27–31)
MCHC RBC AUTO-ENTMCNC: 35.9 G/DL (ref 32–36)
MCV RBC AUTO: 94 FL (ref 82–98)
METHADONE UR QL SCN>300 NG/ML: NEGATIVE
MONOCYTES # BLD AUTO: 0.6 K/UL (ref 0.3–1)
MONOCYTES NFR BLD: 8.5 % (ref 4–15)
NEUTROPHILS # BLD AUTO: 5.5 K/UL (ref 1.8–7.7)
NEUTROPHILS NFR BLD: 73.3 % (ref 38–73)
NITRITE UR QL STRIP: NEGATIVE
NRBC BLD-RTO: 0 /100 WBC
OPIATES UR QL SCN: NEGATIVE
PCP UR QL SCN>25 NG/ML: NEGATIVE
PH UR STRIP: 5 [PH] (ref 5–8)
PLATELET # BLD AUTO: 272 K/UL (ref 150–450)
PMV BLD AUTO: 9.8 FL (ref 9.2–12.9)
POTASSIUM SERPL-SCNC: 3.1 MMOL/L (ref 3.5–5.1)
PROT SERPL-MCNC: 7.3 G/DL (ref 6–8.4)
PROT UR QL STRIP: NEGATIVE
RBC # BLD AUTO: 4.24 M/UL (ref 4.6–6.2)
SARS-COV-2 RDRP RESP QL NAA+PROBE: NEGATIVE
SODIUM SERPL-SCNC: 140 MMOL/L (ref 136–145)
SP GR UR STRIP: 1.02 (ref 1–1.03)
TOXICOLOGY INFORMATION: NORMAL
TSH SERPL DL<=0.005 MIU/L-ACNC: 1.93 UIU/ML (ref 0.4–4)
URN SPEC COLLECT METH UR: ABNORMAL
UROBILINOGEN UR STRIP-ACNC: NEGATIVE EU/DL
WBC # BLD AUTO: 7.54 K/UL (ref 3.9–12.7)

## 2021-09-13 PROCEDURE — 70450 CT HEAD/BRAIN W/O DYE: CPT | Mod: TC

## 2021-09-13 PROCEDURE — 71046 XR CHEST PA AND LATERAL: ICD-10-PCS | Mod: 26,,, | Performed by: RADIOLOGY

## 2021-09-13 PROCEDURE — 82077 ASSAY SPEC XCP UR&BREATH IA: CPT | Performed by: EMERGENCY MEDICINE

## 2021-09-13 PROCEDURE — 71046 X-RAY EXAM CHEST 2 VIEWS: CPT | Mod: 26,,, | Performed by: RADIOLOGY

## 2021-09-13 PROCEDURE — 71046 X-RAY EXAM CHEST 2 VIEWS: CPT | Mod: TC,FY

## 2021-09-13 PROCEDURE — 82550 ASSAY OF CK (CPK): CPT | Performed by: EMERGENCY MEDICINE

## 2021-09-13 PROCEDURE — 80053 COMPREHEN METABOLIC PANEL: CPT | Performed by: EMERGENCY MEDICINE

## 2021-09-13 PROCEDURE — 80307 DRUG TEST PRSMV CHEM ANLYZR: CPT | Performed by: EMERGENCY MEDICINE

## 2021-09-13 PROCEDURE — 84443 ASSAY THYROID STIM HORMONE: CPT | Performed by: EMERGENCY MEDICINE

## 2021-09-13 PROCEDURE — 70450 CT HEAD WITHOUT CONTRAST: ICD-10-PCS | Mod: 26,,, | Performed by: RADIOLOGY

## 2021-09-13 PROCEDURE — 93005 ELECTROCARDIOGRAM TRACING: CPT

## 2021-09-13 PROCEDURE — 36415 COLL VENOUS BLD VENIPUNCTURE: CPT | Performed by: EMERGENCY MEDICINE

## 2021-09-13 PROCEDURE — 70450 CT HEAD/BRAIN W/O DYE: CPT | Mod: 26,,, | Performed by: RADIOLOGY

## 2021-09-13 PROCEDURE — 99285 EMERGENCY DEPT VISIT HI MDM: CPT | Mod: 25

## 2021-09-13 PROCEDURE — 96361 HYDRATE IV INFUSION ADD-ON: CPT

## 2021-09-13 PROCEDURE — 85025 COMPLETE CBC W/AUTO DIFF WBC: CPT | Performed by: EMERGENCY MEDICINE

## 2021-09-13 PROCEDURE — U0002 COVID-19 LAB TEST NON-CDC: HCPCS | Performed by: EMERGENCY MEDICINE

## 2021-09-13 PROCEDURE — 96360 HYDRATION IV INFUSION INIT: CPT

## 2021-09-13 PROCEDURE — 25000003 PHARM REV CODE 250: Performed by: EMERGENCY MEDICINE

## 2021-09-13 PROCEDURE — 81003 URINALYSIS AUTO W/O SCOPE: CPT | Mod: 59 | Performed by: EMERGENCY MEDICINE

## 2021-09-13 RX ADMIN — SODIUM CHLORIDE 1000 ML: 0.9 INJECTION, SOLUTION INTRAVENOUS at 02:09

## 2021-10-04 ENCOUNTER — HOSPITAL ENCOUNTER (OUTPATIENT)
Dept: RADIOLOGY | Facility: HOSPITAL | Age: 56
Discharge: HOME | End: 2021-10-04
Attending: FAMILY MEDICINE
Payer: COMMERCIAL

## 2021-10-04 DIAGNOSIS — F44.5 CONVERSION DISORDER WITH ATTACKS OR SEIZURES: ICD-10-CM

## 2021-10-04 DIAGNOSIS — R55 SYNCOPE AND COLLAPSE: ICD-10-CM

## 2021-10-04 PROCEDURE — 70551 MRI BRAIN WITHOUT CONTRAST: ICD-10-PCS | Mod: 26,MH,, | Performed by: RADIOLOGY

## 2021-10-04 PROCEDURE — 70551 MRI BRAIN STEM W/O DYE: CPT | Mod: 26,MH,, | Performed by: RADIOLOGY

## 2021-10-04 PROCEDURE — 70551 MRI BRAIN STEM W/O DYE: CPT | Mod: TC,PN,MH

## 2022-06-03 ENCOUNTER — HOSPITAL ENCOUNTER (EMERGENCY)
Facility: HOSPITAL | Age: 57
Discharge: HOME | End: 2022-06-03
Attending: FAMILY MEDICINE
Payer: COMMERCIAL

## 2022-06-03 VITALS
HEART RATE: 113 BPM | WEIGHT: 140 LBS | SYSTOLIC BLOOD PRESSURE: 130 MMHG | OXYGEN SATURATION: 97 % | DIASTOLIC BLOOD PRESSURE: 106 MMHG | BODY MASS INDEX: 23.32 KG/M2 | HEIGHT: 65 IN | TEMPERATURE: 98 F | RESPIRATION RATE: 16 BRPM

## 2022-06-03 DIAGNOSIS — F05 CONFUSION AFTER A SEIZURE: ICD-10-CM

## 2022-06-03 DIAGNOSIS — Z78.9 DAILY CONSUMPTION OF ALCOHOL: Primary | ICD-10-CM

## 2022-06-03 LAB
ALBUMIN SERPL BCP-MCNC: 4.1 G/DL (ref 3.5–5.2)
ALP SERPL-CCNC: 145 U/L (ref 55–135)
ALT SERPL W/O P-5'-P-CCNC: 41 U/L (ref 10–44)
AMPHET+METHAMPHET UR QL: NEGATIVE
ANION GAP SERPL CALC-SCNC: 28 MMOL/L (ref 8–16)
AST SERPL-CCNC: 43 U/L (ref 10–40)
BARBITURATES UR QL SCN>200 NG/ML: NEGATIVE
BASOPHILS # BLD AUTO: 0.14 K/UL (ref 0–0.2)
BASOPHILS NFR BLD: 1.5 % (ref 0–1.9)
BENZODIAZ UR QL SCN>200 NG/ML: NEGATIVE
BILIRUB SERPL-MCNC: 0.8 MG/DL (ref 0.1–1)
BUN SERPL-MCNC: 10 MG/DL (ref 6–20)
BZE UR QL SCN: NEGATIVE
CALCIUM SERPL-MCNC: 9.5 MG/DL (ref 8.7–10.5)
CANNABINOIDS UR QL SCN: NEGATIVE
CHLORIDE SERPL-SCNC: 105 MMOL/L (ref 95–110)
CHOLEST SERPL-MCNC: 248 MG/DL (ref 120–199)
CHOLEST/HDLC SERPL: ABNORMAL {RATIO} (ref 2–5)
CO2 SERPL-SCNC: 9 MMOL/L (ref 23–29)
CREAT SERPL-MCNC: 1.5 MG/DL (ref 0.5–1.4)
CREAT UR-MCNC: 174.6 MG/DL (ref 23–375)
DIFFERENTIAL METHOD: ABNORMAL
EOSINOPHIL # BLD AUTO: 0.1 K/UL (ref 0–0.5)
EOSINOPHIL NFR BLD: 1.5 % (ref 0–8)
ERYTHROCYTE [DISTWIDTH] IN BLOOD BY AUTOMATED COUNT: 12.3 % (ref 11.5–14.5)
EST. GFR  (AFRICAN AMERICAN): 58.9 ML/MIN/1.73 M^2
EST. GFR  (NON AFRICAN AMERICAN): 50.9 ML/MIN/1.73 M^2
ETHANOL SERPL-MCNC: <10 MG/DL (ref 0–10)
GLUCOSE SERPL-MCNC: 136 MG/DL (ref 70–110)
HCT VFR BLD AUTO: 45.3 % (ref 40–54)
HDLC SERPL-MCNC: >140 MG/DL (ref 40–75)
HDLC SERPL: ABNORMAL % (ref 20–50)
HGB BLD-MCNC: 15.1 G/DL (ref 14–18)
IMM GRANULOCYTES # BLD AUTO: 0.33 K/UL (ref 0–0.04)
IMM GRANULOCYTES NFR BLD AUTO: 3.5 % (ref 0–0.5)
INR PPP: 1 (ref 0.8–1.2)
LDLC SERPL CALC-MCNC: ABNORMAL MG/DL (ref 63–159)
LYMPHOCYTES # BLD AUTO: 2.3 K/UL (ref 1–4.8)
LYMPHOCYTES NFR BLD: 25.1 % (ref 18–48)
MCH RBC QN AUTO: 31.5 PG (ref 27–31)
MCHC RBC AUTO-ENTMCNC: 33.3 G/DL (ref 32–36)
MCV RBC AUTO: 94 FL (ref 82–98)
METHADONE UR QL SCN>300 NG/ML: NEGATIVE
MONOCYTES # BLD AUTO: 0.8 K/UL (ref 0.3–1)
MONOCYTES NFR BLD: 8.1 % (ref 4–15)
NEUTROPHILS # BLD AUTO: 5.6 K/UL (ref 1.8–7.7)
NEUTROPHILS NFR BLD: 60.3 % (ref 38–73)
NONHDLC SERPL-MCNC: ABNORMAL MG/DL
NRBC BLD-RTO: 0 /100 WBC
OPIATES UR QL SCN: NEGATIVE
PCP UR QL SCN>25 NG/ML: NEGATIVE
PLATELET # BLD AUTO: 249 K/UL (ref 150–450)
PMV BLD AUTO: 10.8 FL (ref 9.2–12.9)
POTASSIUM SERPL-SCNC: 3.1 MMOL/L (ref 3.5–5.1)
PROT SERPL-MCNC: 8.4 G/DL (ref 6–8.4)
PROTHROMBIN TIME: 10.4 SEC (ref 9–12.5)
RBC # BLD AUTO: 4.8 M/UL (ref 4.6–6.2)
SODIUM SERPL-SCNC: 142 MMOL/L (ref 136–145)
TOXICOLOGY INFORMATION: NORMAL
TRIGL SERPL-MCNC: 91 MG/DL (ref 30–150)
TSH SERPL DL<=0.005 MIU/L-ACNC: 2.83 UIU/ML (ref 0.4–4)
WBC # BLD AUTO: 9.34 K/UL (ref 3.9–12.7)

## 2022-06-03 PROCEDURE — 70450 CT HEAD/BRAIN W/O DYE: CPT | Mod: TC

## 2022-06-03 PROCEDURE — 25000003 PHARM REV CODE 250: Performed by: FAMILY MEDICINE

## 2022-06-03 PROCEDURE — 96360 HYDRATION IV INFUSION INIT: CPT

## 2022-06-03 PROCEDURE — 99285 EMERGENCY DEPT VISIT HI MDM: CPT | Mod: 25

## 2022-06-03 PROCEDURE — 80061 LIPID PANEL: CPT | Performed by: FAMILY MEDICINE

## 2022-06-03 PROCEDURE — 99499 NO LOS: ICD-10-PCS | Mod: ,,, | Performed by: PSYCHIATRY & NEUROLOGY

## 2022-06-03 PROCEDURE — 99499 UNLISTED E&M SERVICE: CPT | Mod: ,,, | Performed by: PSYCHIATRY & NEUROLOGY

## 2022-06-03 PROCEDURE — 70450 CT HEAD/BRAIN W/O DYE: CPT | Mod: 26,,, | Performed by: RADIOLOGY

## 2022-06-03 PROCEDURE — 80053 COMPREHEN METABOLIC PANEL: CPT | Performed by: FAMILY MEDICINE

## 2022-06-03 PROCEDURE — 85025 COMPLETE CBC W/AUTO DIFF WBC: CPT | Performed by: FAMILY MEDICINE

## 2022-06-03 PROCEDURE — 82077 ASSAY SPEC XCP UR&BREATH IA: CPT | Performed by: FAMILY MEDICINE

## 2022-06-03 PROCEDURE — 70450 CT HEAD WITHOUT CONTRAST: ICD-10-PCS | Mod: 26,,, | Performed by: RADIOLOGY

## 2022-06-03 PROCEDURE — 93010 ELECTROCARDIOGRAM REPORT: CPT | Mod: ,,, | Performed by: INTERNAL MEDICINE

## 2022-06-03 PROCEDURE — 84443 ASSAY THYROID STIM HORMONE: CPT | Performed by: FAMILY MEDICINE

## 2022-06-03 PROCEDURE — 93010 EKG 12-LEAD: ICD-10-PCS | Mod: ,,, | Performed by: INTERNAL MEDICINE

## 2022-06-03 PROCEDURE — 36415 COLL VENOUS BLD VENIPUNCTURE: CPT | Performed by: FAMILY MEDICINE

## 2022-06-03 PROCEDURE — 85610 PROTHROMBIN TIME: CPT | Performed by: FAMILY MEDICINE

## 2022-06-03 PROCEDURE — 93005 ELECTROCARDIOGRAM TRACING: CPT

## 2022-06-03 PROCEDURE — 27000221 HC OXYGEN, UP TO 24 HOURS

## 2022-06-03 PROCEDURE — 80307 DRUG TEST PRSMV CHEM ANLYZR: CPT | Performed by: FAMILY MEDICINE

## 2022-06-03 RX ORDER — ACETAMINOPHEN 500 MG
1000 TABLET ORAL
Status: COMPLETED | OUTPATIENT
Start: 2022-06-03 | End: 2022-06-03

## 2022-06-03 RX ORDER — CHLORDIAZEPOXIDE HYDROCHLORIDE 25 MG/1
25 CAPSULE, GELATIN COATED ORAL 2 TIMES DAILY
Qty: 15 CAPSULE | Refills: 0 | Status: SHIPPED | OUTPATIENT
Start: 2022-06-03 | End: 2022-07-03

## 2022-06-03 RX ADMIN — ACETAMINOPHEN 1000 MG: 500 TABLET ORAL at 03:06

## 2022-06-03 RX ADMIN — SODIUM CHLORIDE 1000 ML: 0.9 INJECTION, SOLUTION INTRAVENOUS at 02:06

## 2022-06-03 NOTE — ED PROVIDER NOTES
Encounter Date: 6/3/2022       History     Chief Complaint   Patient presents with    Seizures     57-year-old male presents per private vehicle, the vehicle was driven up to the ambulance door by a family member who was the patient's sister she states that he was at work with her when she noticed and jerking like activity followed by nonresponsive in his he did not fall or lose full consciousness apparently he has known to have had seizures in the past, subsequent history reveals that the patient is a heavy daily alcohol drinker and has been known to have seizures though he currently is not on seizure medication he has no known history of CVA has no history of diabetes he has GERD and hypertension        Review of patient's allergies indicates:  No Known Allergies  Past Medical History:   Diagnosis Date    GERD (gastroesophageal reflux disease)     Hypertension      Past Surgical History:   Procedure Laterality Date    EYE SURGERY       Family History   Problem Relation Age of Onset    Diabetes Mother     Hypertension Mother     Stroke Father     Heart attack Father     No Known Problems Sister     Hypertension Brother     No Known Problems Sister     No Known Problems Sister     Arthritis Brother     No Known Problems Brother      Social History     Tobacco Use    Smoking status: Never Smoker    Smokeless tobacco: Never Used   Substance Use Topics    Alcohol use: Yes     Comment: Socially    Drug use: No     Review of Systems   Constitutional: Negative for fever.   HENT: Negative for sore throat.    Respiratory: Negative for shortness of breath.    Cardiovascular: Negative for chest pain.   Gastrointestinal: Negative for nausea.   Genitourinary: Negative for dysuria.   Musculoskeletal: Negative for back pain.   Skin: Negative for rash.   Neurological: Negative for weakness.   Hematological: Does not bruise/bleed easily.       Physical Exam     Initial Vitals   BP Pulse Resp Temp SpO2   06/03/22  1350 06/03/22 1349 06/03/22 1402 06/03/22 1350 06/03/22 1349   (!) 143/93 (!) 149 (!) 25 98.2 °F (36.8 °C) 96 %      MAP       --                Physical Exam    Nursing note and vitals reviewed.  Constitutional: He appears well-developed and well-nourished. He is not diaphoretic. No distress.   HENT:   Head: Normocephalic and atraumatic.   Nose: Nose normal.   Mouth/Throat: Oropharynx is clear and moist. No oropharyngeal exudate.   Eyes: EOM are normal.   Neck: Neck supple. No tracheal deviation present.   Normal range of motion.  Cardiovascular: Normal rate and regular rhythm.   No murmur heard.  Pulmonary/Chest: Breath sounds normal. No stridor. No respiratory distress. He has no rales.   Abdominal: Abdomen is soft. He exhibits no distension and no mass. There is no abdominal tenderness. There is no rebound.   Musculoskeletal:         General: No edema. Normal range of motion.      Cervical back: Normal range of motion and neck supple.     Lymphadenopathy:     He has no cervical adenopathy.   Neurological: He has normal strength. No cranial nerve deficit or sensory deficit. GCS eye subscore is 4. GCS verbal subscore is 5. GCS motor subscore is 5.   Patient appears slightly dazed but his neuro exam is nonfocal has normal motor control no visual deficits and can answer appropriately if not slowly   Skin: Skin is warm and dry. Capillary refill takes less than 2 seconds. No pallor.   Psychiatric: He has a normal mood and affect.         ED Course   Procedures  Labs Reviewed   CBC W/ AUTO DIFFERENTIAL - Abnormal; Notable for the following components:       Result Value    MCH 31.5 (*)     Immature Granulocytes 3.5 (*)     Immature Grans (Abs) 0.33 (*)     All other components within normal limits   COMPREHENSIVE METABOLIC PANEL - Abnormal; Notable for the following components:    Potassium 3.1 (*)     CO2 9 (*)     Glucose 136 (*)     Creatinine 1.5 (*)     Alkaline Phosphatase 145 (*)     AST 43 (*)     Anion Gap 28  (*)     eGFR if  58.9 (*)     eGFR if non  50.9 (*)     All other components within normal limits    Narrative:      co2  critical result(s) called and verbal readback obtained from   samuel hernandez by BB3 06/03/2022 14:22   LIPID PANEL - Abnormal; Notable for the following components:    Cholesterol 248 (*)     HDL >140 (*)     All other components within normal limits   PROTIME-INR   TSH   DRUG SCREEN PANEL, URINE EMERGENCY    Narrative:     Specimen Source->Urine   ALCOHOL,MEDICAL (ETHANOL)   POCT GLUCOSE MONITORING CONTINUOUS          Imaging Results          CT Head Without Contrast (Final result)  Result time 06/03/22 14:02:33    Final result by Myrna Lagunas MD (06/03/22 14:02:33)                 Impression:      No acute abnormality.  Chronic microvascular ischemia, chronic lacunar infarcts.  Mild ethmoid sinus disease.      Electronically signed by: Myrna Lagunas  Date:    06/03/2022  Time:    14:02             Narrative:    EXAMINATION:  CT HEAD WITHOUT CONTRAST    CLINICAL HISTORY:  Neuro deficit, acute, stroke suspected;    TECHNIQUE:  Low dose axial images were obtained through the head.  Coronal and sagittal reformations were also performed. Contrast was not administered.    COMPARISON:  09/13/2021 and MRI 10/04/2021    FINDINGS:  There is no intra or extra-axial hemorrhage.  No mass effect, edema or midline shift is present.  The ventricular system and cortical sulcal markings are appropriate for the patient's age.  There is a chronic lacunar infarct in the right frontal white matter.  There are moderate periventricular low densities of chronic microvascular ischemia, unchanged.  There is a chronic lacunar infarct in the left thalamus.  Intracranial atherosclerosis is noted.    The bony calvarium is intact.  There is opacification of 1 of the right ethmoid air cells.  Otherwise, the visualized paranasal sinuses are clear.                                  Medications   sodium chloride 0.9% bolus 1,000 mL (0 mLs Intravenous Stopped 6/3/22 1555)   acetaminophen tablet 1,000 mg (1,000 mg Oral Given 6/3/22 1554)                          Clinical Impression:   Final diagnoses:  [F05] Confusion after a seizure  [Z78.9] Daily consumption of alcohol (Primary)          ED Disposition Condition    Discharge Stable        ED Prescriptions     Medication Sig Dispense Start Date End Date Auth. Provider    chlordiazepoxide (LIBRIUM) 25 MG Cap Take 1 capsule (25 mg total) by mouth 2 (two) times a day. 15 capsule 6/3/2022 7/3/2022 Geraldo Back MD        Follow-up Information    None          Geraldo Back MD  06/03/22 2164

## 2022-06-03 NOTE — ED TRIAGE NOTES
Patient presents to ED ambulance bay accompanied by family. Per family at 1335 the patient began mumbling and having difficulty getting words out. He then began seizing. They report it lasted a few minutes then the pt woke up. Upon arrival to the ED the pt is oriented to self and birthday only. He is diaphoretic. Patient brought to ED room 2 and Dr. Elizabeth at bedside.

## 2022-06-03 NOTE — CONSULTS
Hx of seizures. Seizures witnessed by family. Now nonfocal with postictal confusion.  Consult cancelled.

## 2022-06-06 ENCOUNTER — HOSPITAL ENCOUNTER (OUTPATIENT)
Dept: RADIOLOGY | Facility: HOSPITAL | Age: 57
Discharge: HOME | End: 2022-06-06
Attending: FAMILY MEDICINE
Payer: COMMERCIAL

## 2022-06-06 DIAGNOSIS — R52 PAIN: Primary | ICD-10-CM

## 2022-06-06 DIAGNOSIS — R52 PAIN: ICD-10-CM

## 2022-06-06 PROCEDURE — 72110 X-RAY EXAM L-2 SPINE 4/>VWS: CPT | Mod: 26,,, | Performed by: RADIOLOGY

## 2022-06-06 PROCEDURE — 72110 X-RAY EXAM L-2 SPINE 4/>VWS: CPT | Mod: TC,FY

## 2022-06-06 PROCEDURE — 72110 XR LUMBAR SPINE COMPLETE 5 VIEW: ICD-10-PCS | Mod: 26,,, | Performed by: RADIOLOGY

## 2022-06-30 LAB — POCT GLUCOSE: 135 MG/DL (ref 70–110)

## 2022-07-05 ENCOUNTER — LAB VISIT (OUTPATIENT)
Dept: LAB | Facility: HOSPITAL | Age: 57
End: 2022-07-05
Attending: FAMILY MEDICINE
Payer: COMMERCIAL

## 2022-07-05 DIAGNOSIS — R56.9 GENERALIZED-ONSET SEIZURES: Primary | ICD-10-CM

## 2022-07-05 LAB
ALBUMIN SERPL BCP-MCNC: 3.7 G/DL (ref 3.5–5.2)
ALP SERPL-CCNC: 85 U/L (ref 55–135)
ALT SERPL W/O P-5'-P-CCNC: 25 U/L (ref 10–44)
ANION GAP SERPL CALC-SCNC: 12 MMOL/L (ref 8–16)
AST SERPL-CCNC: 17 U/L (ref 10–40)
BASOPHILS # BLD AUTO: 0.08 K/UL (ref 0–0.2)
BASOPHILS NFR BLD: 2 % (ref 0–1.9)
BILIRUB SERPL-MCNC: 0.5 MG/DL (ref 0.1–1)
BUN SERPL-MCNC: 11 MG/DL (ref 6–20)
CALCIUM SERPL-MCNC: 9.4 MG/DL (ref 8.7–10.5)
CHLORIDE SERPL-SCNC: 106 MMOL/L (ref 95–110)
CHOLEST SERPL-MCNC: 141 MG/DL (ref 120–199)
CHOLEST/HDLC SERPL: 2.3 {RATIO} (ref 2–5)
CO2 SERPL-SCNC: 25 MMOL/L (ref 23–29)
CREAT SERPL-MCNC: 1 MG/DL (ref 0.5–1.4)
DIFFERENTIAL METHOD: ABNORMAL
EOSINOPHIL # BLD AUTO: 0.3 K/UL (ref 0–0.5)
EOSINOPHIL NFR BLD: 6.7 % (ref 0–8)
ERYTHROCYTE [DISTWIDTH] IN BLOOD BY AUTOMATED COUNT: 11.7 % (ref 11.5–14.5)
EST. GFR  (AFRICAN AMERICAN): >60 ML/MIN/1.73 M^2
EST. GFR  (NON AFRICAN AMERICAN): >60 ML/MIN/1.73 M^2
ESTIMATED AVG GLUCOSE: 105 MG/DL (ref 68–131)
GLUCOSE SERPL-MCNC: 83 MG/DL (ref 70–110)
HBA1C MFR BLD: 5.3 % (ref 4–5.6)
HCT VFR BLD AUTO: 41.1 % (ref 40–54)
HDLC SERPL-MCNC: 61 MG/DL (ref 40–75)
HDLC SERPL: 43.3 % (ref 20–50)
HGB BLD-MCNC: 13.9 G/DL (ref 14–18)
IMM GRANULOCYTES # BLD AUTO: 0.01 K/UL (ref 0–0.04)
IMM GRANULOCYTES NFR BLD AUTO: 0.2 % (ref 0–0.5)
LDLC SERPL CALC-MCNC: 48.8 MG/DL (ref 63–159)
LYMPHOCYTES # BLD AUTO: 1.2 K/UL (ref 1–4.8)
LYMPHOCYTES NFR BLD: 28.4 % (ref 18–48)
MCH RBC QN AUTO: 30.6 PG (ref 27–31)
MCHC RBC AUTO-ENTMCNC: 33.8 G/DL (ref 32–36)
MCV RBC AUTO: 91 FL (ref 82–98)
MONOCYTES # BLD AUTO: 0.5 K/UL (ref 0.3–1)
MONOCYTES NFR BLD: 11.9 % (ref 4–15)
NEUTROPHILS # BLD AUTO: 2.1 K/UL (ref 1.8–7.7)
NEUTROPHILS NFR BLD: 50.8 % (ref 38–73)
NONHDLC SERPL-MCNC: 80 MG/DL
NRBC BLD-RTO: 0 /100 WBC
PLATELET # BLD AUTO: 218 K/UL (ref 150–450)
PMV BLD AUTO: 10.5 FL (ref 9.2–12.9)
POTASSIUM SERPL-SCNC: 3.7 MMOL/L (ref 3.5–5.1)
PROT SERPL-MCNC: 6.9 G/DL (ref 6–8.4)
RBC # BLD AUTO: 4.54 M/UL (ref 4.6–6.2)
SODIUM SERPL-SCNC: 143 MMOL/L (ref 136–145)
TRIGL SERPL-MCNC: 156 MG/DL (ref 30–150)
WBC # BLD AUTO: 4.05 K/UL (ref 3.9–12.7)

## 2022-07-05 PROCEDURE — 85025 COMPLETE CBC W/AUTO DIFF WBC: CPT | Performed by: FAMILY MEDICINE

## 2022-07-05 PROCEDURE — 83036 HEMOGLOBIN GLYCOSYLATED A1C: CPT | Performed by: FAMILY MEDICINE

## 2022-07-05 PROCEDURE — 80061 LIPID PANEL: CPT | Performed by: FAMILY MEDICINE

## 2022-07-05 PROCEDURE — 80053 COMPREHEN METABOLIC PANEL: CPT | Performed by: FAMILY MEDICINE

## 2022-07-05 PROCEDURE — 36415 COLL VENOUS BLD VENIPUNCTURE: CPT | Performed by: FAMILY MEDICINE

## 2023-03-16 ENCOUNTER — HOSPITAL ENCOUNTER (OUTPATIENT)
Dept: RADIOLOGY | Facility: HOSPITAL | Age: 58
Discharge: HOME | End: 2023-03-16
Payer: COMMERCIAL

## 2023-03-16 DIAGNOSIS — M79.2 NERVE PAIN: ICD-10-CM

## 2023-03-16 DIAGNOSIS — M79.2 NERVE PAIN: Primary | ICD-10-CM

## 2023-03-16 PROCEDURE — 72050 X-RAY EXAM NECK SPINE 4/5VWS: CPT | Mod: TC

## 2023-03-16 PROCEDURE — 72050 X-RAY EXAM NECK SPINE 4/5VWS: CPT | Mod: 26,,, | Performed by: RADIOLOGY

## 2023-03-16 PROCEDURE — 72050 XR CERVICAL SPINE COMPLETE 5 VIEW: ICD-10-PCS | Mod: 26,,, | Performed by: RADIOLOGY

## 2024-08-19 ENCOUNTER — HOSPITAL ENCOUNTER (EMERGENCY)
Facility: HOSPITAL | Age: 59
Discharge: HOME | End: 2024-08-19
Attending: EMERGENCY MEDICINE
Payer: COMMERCIAL

## 2024-08-19 VITALS
SYSTOLIC BLOOD PRESSURE: 154 MMHG | BODY MASS INDEX: 19.61 KG/M2 | HEART RATE: 85 BPM | OXYGEN SATURATION: 96 % | TEMPERATURE: 98 F | HEIGHT: 70 IN | DIASTOLIC BLOOD PRESSURE: 96 MMHG | WEIGHT: 137 LBS | RESPIRATION RATE: 14 BRPM

## 2024-08-19 DIAGNOSIS — S00.83XA TRAUMATIC HEMATOMA OF FOREHEAD, INITIAL ENCOUNTER: ICD-10-CM

## 2024-08-19 DIAGNOSIS — R55 SYNCOPE: ICD-10-CM

## 2024-08-19 DIAGNOSIS — R40.20 LOC (LOSS OF CONSCIOUSNESS): ICD-10-CM

## 2024-08-19 DIAGNOSIS — G40.919 BREAKTHROUGH SEIZURE: Primary | ICD-10-CM

## 2024-08-19 LAB
ALBUMIN SERPL BCP-MCNC: 4.3 G/DL (ref 3.5–5.2)
ALP SERPL-CCNC: 130 U/L (ref 55–135)
ALT SERPL W/O P-5'-P-CCNC: 27 U/L (ref 10–44)
ANION GAP SERPL CALC-SCNC: 15 MMOL/L (ref 8–16)
AST SERPL-CCNC: 38 U/L (ref 10–40)
BASOPHILS # BLD AUTO: 0.1 K/UL (ref 0–0.2)
BASOPHILS NFR BLD: 1 % (ref 0–1.9)
BILIRUB SERPL-MCNC: 0.6 MG/DL (ref 0.1–1)
BUN SERPL-MCNC: 20 MG/DL (ref 6–20)
CALCIUM SERPL-MCNC: 10 MG/DL (ref 8.7–10.5)
CHLORIDE SERPL-SCNC: 105 MMOL/L (ref 95–110)
CO2 SERPL-SCNC: 17 MMOL/L (ref 23–29)
CREAT SERPL-MCNC: 1.2 MG/DL (ref 0.5–1.4)
DIFFERENTIAL METHOD BLD: ABNORMAL
EOSINOPHIL # BLD AUTO: 0.1 K/UL (ref 0–0.5)
EOSINOPHIL NFR BLD: 1 % (ref 0–8)
ERYTHROCYTE [DISTWIDTH] IN BLOOD BY AUTOMATED COUNT: 12.4 % (ref 11.5–14.5)
EST. GFR  (NO RACE VARIABLE): >60 ML/MIN/1.73 M^2
GLUCOSE SERPL-MCNC: 126 MG/DL (ref 70–110)
HCT VFR BLD AUTO: 44.8 % (ref 40–54)
HCV AB SERPL QL IA: NORMAL
HGB BLD-MCNC: 15.3 G/DL (ref 14–18)
HIV 1+2 AB+HIV1 P24 AG SERPL QL IA: NORMAL
IMM GRANULOCYTES # BLD AUTO: 0.06 K/UL (ref 0–0.04)
IMM GRANULOCYTES NFR BLD AUTO: 0.6 % (ref 0–0.5)
LYMPHOCYTES # BLD AUTO: 1 K/UL (ref 1–4.8)
LYMPHOCYTES NFR BLD: 10 % (ref 18–48)
MCH RBC QN AUTO: 30.7 PG (ref 27–31)
MCHC RBC AUTO-ENTMCNC: 34.2 G/DL (ref 32–36)
MCV RBC AUTO: 90 FL (ref 82–98)
MONOCYTES # BLD AUTO: 0.6 K/UL (ref 0.3–1)
MONOCYTES NFR BLD: 5.7 % (ref 4–15)
NEUTROPHILS # BLD AUTO: 8.1 K/UL (ref 1.8–7.7)
NEUTROPHILS NFR BLD: 81.7 % (ref 38–73)
NRBC BLD-RTO: 0 /100 WBC
PLATELET # BLD AUTO: 249 K/UL (ref 150–450)
PMV BLD AUTO: 10.4 FL (ref 9.2–12.9)
POCT GLUCOSE: 115 MG/DL (ref 70–110)
POTASSIUM SERPL-SCNC: 3.9 MMOL/L (ref 3.5–5.1)
PROT SERPL-MCNC: 8 G/DL (ref 6–8.4)
RBC # BLD AUTO: 4.99 M/UL (ref 4.6–6.2)
SODIUM SERPL-SCNC: 137 MMOL/L (ref 136–145)
TROPONIN I SERPL DL<=0.01 NG/ML-MCNC: <0.006 NG/ML (ref 0–0.03)
WBC # BLD AUTO: 9.89 K/UL (ref 3.9–12.7)

## 2024-08-19 PROCEDURE — 70486 CT MAXILLOFACIAL W/O DYE: CPT | Mod: TC

## 2024-08-19 PROCEDURE — 25000003 PHARM REV CODE 250: Performed by: EMERGENCY MEDICINE

## 2024-08-19 PROCEDURE — 12011 RPR F/E/E/N/L/M 2.5 CM/<: CPT

## 2024-08-19 PROCEDURE — 93005 ELECTROCARDIOGRAM TRACING: CPT | Mod: 59

## 2024-08-19 PROCEDURE — 93010 ELECTROCARDIOGRAM REPORT: CPT | Mod: ,,, | Performed by: INTERNAL MEDICINE

## 2024-08-19 PROCEDURE — 87389 HIV-1 AG W/HIV-1&-2 AB AG IA: CPT | Performed by: EMERGENCY MEDICINE

## 2024-08-19 PROCEDURE — 70486 CT MAXILLOFACIAL W/O DYE: CPT | Mod: 26,,, | Performed by: RADIOLOGY

## 2024-08-19 PROCEDURE — 86803 HEPATITIS C AB TEST: CPT | Performed by: EMERGENCY MEDICINE

## 2024-08-19 PROCEDURE — 70450 CT HEAD/BRAIN W/O DYE: CPT | Mod: TC

## 2024-08-19 PROCEDURE — 80177 DRUG SCRN QUAN LEVETIRACETAM: CPT | Performed by: EMERGENCY MEDICINE

## 2024-08-19 PROCEDURE — 36415 COLL VENOUS BLD VENIPUNCTURE: CPT | Performed by: EMERGENCY MEDICINE

## 2024-08-19 PROCEDURE — 84484 ASSAY OF TROPONIN QUANT: CPT | Performed by: EMERGENCY MEDICINE

## 2024-08-19 PROCEDURE — 85025 COMPLETE CBC W/AUTO DIFF WBC: CPT | Performed by: EMERGENCY MEDICINE

## 2024-08-19 PROCEDURE — 99285 EMERGENCY DEPT VISIT HI MDM: CPT | Mod: 25

## 2024-08-19 PROCEDURE — 80053 COMPREHEN METABOLIC PANEL: CPT | Performed by: EMERGENCY MEDICINE

## 2024-08-19 PROCEDURE — 82962 GLUCOSE BLOOD TEST: CPT

## 2024-08-19 PROCEDURE — 70450 CT HEAD/BRAIN W/O DYE: CPT | Mod: 26,,, | Performed by: RADIOLOGY

## 2024-08-19 PROCEDURE — 96374 THER/PROPH/DIAG INJ IV PUSH: CPT

## 2024-08-19 PROCEDURE — 63600175 PHARM REV CODE 636 W HCPCS: Performed by: EMERGENCY MEDICINE

## 2024-08-19 RX ORDER — LEVETIRACETAM 500 MG/5ML
1000 INJECTION, SOLUTION, CONCENTRATE INTRAVENOUS
Status: COMPLETED | OUTPATIENT
Start: 2024-08-19 | End: 2024-08-19

## 2024-08-19 RX ADMIN — BACITRACIN ZINC, NEOMYCIN, POLYMYXIN B 1 EACH: 400; 3.5; 5 OINTMENT TOPICAL at 02:08

## 2024-08-19 RX ADMIN — LEVETIRACETAM 1000 MG: 100 INJECTION, SOLUTION INTRAVENOUS at 02:08

## 2024-08-19 NOTE — ED NOTES
Rounding conducted, with no safety concerns identified.  Call bell within reach.  Bed locked in low position.  SR up x 2.  Care plan reviewed with patient and family.  Opportunities provided to ask questions, with answers to meet their needs. Patient pain reassessed.  Bathroom needs addressed.  No new complaints but requesting [place] collar be removed.   MD notified of request.  RR regular and non labored.  Skin W/D/P.  Family present.

## 2024-08-19 NOTE — ED NOTES
Patient presents with report of syncope and possible seizure activity prior to arrival.  Was outside working when camera catches patient appearing confused, unstable walking around, with a fall forward.  Wife concerned that the patient may have had seizure.  Presents with abrasions to face, hematoma to face, and abrasion to left knee.  Denies neck or back pain but in [place] collar.  No incontinence.  Patient MORENO well.  States headache present.  History of seizures with medications, taking prescription as ordered per patient.     Pain:  Rated 5/10.     Psychosocial:  Patient is calm and cooperative.  Patients insight and judgement are appropriate to situation.  Appears clean, well maintained, with clothing appropriate to environment.  No evidence of delusions, hallucinations, or psychosis.     Neuro:  Eyes open spontaneously.  Awake, alert, oriented x 4.  Speech clear and appropriate.  Tolerating saliva secretions well.  Able to follow commands, demonstrating ability to actively and appropriately communicate within context of current conversation.  Symmetrical facial muscles.  Moving all extremities well with no noted weakness.  Adequate muscle tone present.  Movement is purposeful.       Airway:  Bilateral chest rise and fall.  RR regular and non-labored.  No crepitus or subcutaneous emphysema noted on palpation.       Circulatory:  Skin warm, dry, and pink.  Apical and radial pulses strong and regular.  Capillary refill/skin blanching less than 3 seconds to distal of 4 extremities.  SR on the CM without noted ectopy.     Urinary:  Patient denies pain, frequency, or urgency.  Voids independently.       Extremities:  No redness, heat, swelling, deformity, or pain.     Skin:  See notes.    Ice pack applied to left forehead hematoma.    SR up x 2 for safety.

## 2024-08-19 NOTE — ED NOTES
Patient received for discharge.  Patient is awake, alert, and oriented x 4.  Speech clear and appropriate.  RR regular and non labored.  Skin W/D/P.  Given written and verbal discharge instruction, with verbal understanding.  Patient given the opportunity to ask questions, with answers to meet needs.  No complaints or noted concerns.  No pain.  Ambulatory with steady gait out of ED.

## 2024-08-19 NOTE — ED PROVIDER NOTES
History     Chief Complaint   Patient presents with    Loss of Consciousness     Patient experienced a syncopal episode with possible seizure activity.      HPI:  Drew Kaur is a 59 y.o. male with PMH as below who presents to the [facility] emergency department for evaluation of seizure-like activity. Patient has had less sleep the past few days, caring for his mother with Alzheimer's disease. He was walking outside and per video shown to me, patient started mumbling rhythmically then fell to the floor, landing on head/face, with tensing up after. His last seizure was 2 years ago and he is compliant with Keppra BID. He has abrasions over the face, facial pain, headache.       PCP: Ishmael Izqiuerdo MD    Review of patient's allergies indicates:  No Known Allergies   Past Medical History:   Diagnosis Date    GERD (gastroesophageal reflux disease)     Hypertension      Past Surgical History:   Procedure Laterality Date    EYE SURGERY         Family History   Problem Relation Name Age of Onset    Diabetes Mother      Hypertension Mother      Stroke Father      Heart attack Father      No Known Problems Sister      Hypertension Brother      No Known Problems Sister      No Known Problems Sister      Arthritis Brother      No Known Problems Brother       Social History     Tobacco Use    Smoking status: Never    Smokeless tobacco: Never   Substance and Sexual Activity    Alcohol use: Yes     Comment: Socially    Drug use: No    Sexual activity: Not Currently      Review of Systems     Review of Systems   Constitutional: Negative.  Negative for fever.   HENT: Negative.     Eyes: Negative.    Respiratory: Negative.  Negative for cough and shortness of breath.    Cardiovascular: Negative.  Negative for chest pain and palpitations.   Gastrointestinal: Negative.    Endocrine: Negative.    Genitourinary: Negative.    Musculoskeletal: Negative.    Skin: Negative.    Allergic/Immunologic: Negative.     Neurological:  Positive for seizures and headaches.   Hematological: Negative.    Psychiatric/Behavioral: Negative.     All other systems reviewed and are negative.       Physical Exam     Initial Vitals [08/19/24 1123]   BP Pulse Resp Temp SpO2   (!) 127/91 (!) 140 20 98.1 °F (36.7 °C) 95 %      MAP       --          Nursing notes and vital signs reviewed.  Constitutional: Patient is in mild to moderate distress.   Head: Forehead hematoma and abrasion.   Eyes:  Conjunctivae are not pale. No scleral icterus.   ENT: Mucous membranes moist. Right zygomatic & nasal tenderness & abrasion.   Neck: Supple.   Cardiovascular: Regular rate. Regular rhythm. No murmurs, rubs, or gallops   Pulmonary: No respiratory distress. Clear to auscultation bilaterally   Abdominal: Non-distended.   Musculoskeletal: Moves all extremities. No obvious deformities.   Skin: Warm and dry. Right zygomatic facial region 2 mm laceration.   Neurological:  Alert, awake, and appropriate. Normal speech. No acute lateralizing neurologic deficits appreciated.   Psychiatric: Normal affect.       ED Course   Lac Repair    Date/Time: 8/19/2024 3:30 PM    Performed by: Omar Waller MD  Authorized by: Omar Waller MD    Consent:     Consent obtained:  Verbal    Consent given by:  Patient  Anesthesia:     Anesthesia method:  None  Laceration details:     Location:  Face    Face location:  R cheek    Length (cm):  0.2    Depth (mm):  1  Pre-procedure details:     Preparation:  Patient was prepped and draped in usual sterile fashion and imaging obtained to evaluate for foreign bodies  Exploration:     Hemostasis achieved with:  Direct pressure    Imaging outcome: foreign body not noted      Wound exploration: wound explored through full range of motion and entire depth of wound visualized      Wound extent: no foreign bodies/material noted, no muscle damage noted, no tendon damage noted, no underlying fracture noted and no vascular damage  "noted      Contaminated: no    Treatment:     Area cleansed with:  Saline    Amount of cleaning:  Extensive    Irrigation solution:  Sterile saline    Irrigation method:  Syringe    Debridement:  None    Undermining:  None  Skin repair:     Repair method:  Sutures  Approximation:     Approximation:  Close  Repair type:     Repair type:  Simple  Post-procedure details:     Dressing:  Non-adherent dressing and antibiotic ointment    Procedure completion:  Tolerated well, no immediate complications    Vitals:    08/19/24 1123 08/19/24 1200 08/19/24 1230 08/19/24 1300   BP: (!) 127/91 122/89 116/81 137/86   Pulse: (!) 140 106 100 93   Resp: 20 16 16 15   Temp: 98.1 °F (36.7 °C)      TempSrc: Oral      SpO2: 95% (!) 92% (!) 93% (!) 92%   Weight: 62.1 kg (137 lb)      Height: 5' 10" (1.778 m)       08/19/24 1330 08/19/24 1352 08/19/24 1354 08/19/24 1356   BP: (!) 135/90 135/87 (!) 149/96 (!) 154/96   Pulse: 90 90 93 101   Resp: 14      Temp:       TempSrc:       SpO2: 95% 96% 95% (!) 94%   Weight:       Height:        08/19/24 1400 08/19/24 1500   BP: (!) 154/96    Pulse: 91 85   Resp: 18 14   Temp: 98.2 °F (36.8 °C)    TempSrc: Oral    SpO2: 95% 96%   Weight:     Height:       Lab Results Interpreted as Abnormal:  Labs Reviewed   CBC W/ AUTO DIFFERENTIAL - Abnormal       Result Value    WBC 9.89      RBC 4.99      Hemoglobin 15.3      Hematocrit 44.8      MCV 90      MCH 30.7      MCHC 34.2      RDW 12.4      Platelets 249      MPV 10.4      Immature Granulocytes 0.6 (*)     Gran # (ANC) 8.1 (*)     Immature Grans (Abs) 0.06 (*)     Lymph # 1.0      Mono # 0.6      Eos # 0.1      Baso # 0.10      nRBC 0      Gran % 81.7 (*)     Lymph % 10.0 (*)     Mono % 5.7      Eosinophil % 1.0      Basophil % 1.0      Differential Method Automated     COMPREHENSIVE METABOLIC PANEL - Abnormal    Sodium 137      Potassium 3.9      Chloride 105      CO2 17 (*)     Glucose 126 (*)     BUN 20      Creatinine 1.2      Calcium 10.0      " Total Protein 8.0      Albumin 4.3      Total Bilirubin 0.6      Alkaline Phosphatase 130      AST 38      ALT 27      eGFR >60.0      Anion Gap 15     POCT GLUCOSE - Abnormal    POCT Glucose 115 (*)    TROPONIN I    Troponin I <0.006     LEVETIRACETAM  (KEPPRA) LEVEL   HIV 1 / 2 ANTIBODY   HEPATITIS C ANTIBODY   LEVETIRACETAM  (KEPPRA) LEVEL   POCT GLUCOSE MONITORING CONTINUOUS      All Lab Results:  Results for orders placed or performed during the hospital encounter of 08/19/24   CBC auto differential   Result Value Ref Range    WBC 9.89 3.90 - 12.70 K/uL    RBC 4.99 4.60 - 6.20 M/uL    Hemoglobin 15.3 14.0 - 18.0 g/dL    Hematocrit 44.8 40.0 - 54.0 %    MCV 90 82 - 98 fL    MCH 30.7 27.0 - 31.0 pg    MCHC 34.2 32.0 - 36.0 g/dL    RDW 12.4 11.5 - 14.5 %    Platelets 249 150 - 450 K/uL    MPV 10.4 9.2 - 12.9 fL    Immature Granulocytes 0.6 (H) 0.0 - 0.5 %    Gran # (ANC) 8.1 (H) 1.8 - 7.7 K/uL    Immature Grans (Abs) 0.06 (H) 0.00 - 0.04 K/uL    Lymph # 1.0 1.0 - 4.8 K/uL    Mono # 0.6 0.3 - 1.0 K/uL    Eos # 0.1 0.0 - 0.5 K/uL    Baso # 0.10 0.00 - 0.20 K/uL    nRBC 0 0 /100 WBC    Gran % 81.7 (H) 38.0 - 73.0 %    Lymph % 10.0 (L) 18.0 - 48.0 %    Mono % 5.7 4.0 - 15.0 %    Eosinophil % 1.0 0.0 - 8.0 %    Basophil % 1.0 0.0 - 1.9 %    Differential Method Automated    Comprehensive metabolic panel   Result Value Ref Range    Sodium 137 136 - 145 mmol/L    Potassium 3.9 3.5 - 5.1 mmol/L    Chloride 105 95 - 110 mmol/L    CO2 17 (L) 23 - 29 mmol/L    Glucose 126 (H) 70 - 110 mg/dL    BUN 20 6 - 20 mg/dL    Creatinine 1.2 0.5 - 1.4 mg/dL    Calcium 10.0 8.7 - 10.5 mg/dL    Total Protein 8.0 6.0 - 8.4 g/dL    Albumin 4.3 3.5 - 5.2 g/dL    Total Bilirubin 0.6 0.1 - 1.0 mg/dL    Alkaline Phosphatase 130 55 - 135 U/L    AST 38 10 - 40 U/L    ALT 27 10 - 44 U/L    eGFR >60.0 >60 mL/min/1.73 m^2    Anion Gap 15 8 - 16 mmol/L   Troponin I   Result Value Ref Range    Troponin I <0.006 0.000 - 0.026 ng/mL   POCT glucose    Result Value Ref Range    POCT Glucose 115 (H) 70 - 110 mg/dL     Imaging Results              CT Maxillofacial Without Contrast (Final result)  Result time 08/19/24 14:32:51      Final result by Tommy De La O MD (08/19/24 14:32:51)                   Impression:      1. No acute fracture or dislocation.  2. Small left frontal cephalohematoma.  3. Mild paranasal sinus disease.      Electronically signed by: Tommy De La O  Date:    08/19/2024  Time:    14:32               Narrative:    EXAMINATION:  CT MAXILLOFACIAL WITHOUT CONTRAST    CLINICAL HISTORY:  Facial trauma, blunt;    TECHNIQUE:  2.5mm contiguous low dose non-contrast axial images were acquired through thefacial bones.  Subsequently, coronal and sagittal reconstructed images were generated from the source data.    COMPARISON:  CT same day.    FINDINGS:  Facial bones:  There is no evidence of acute, displaced facial bone fracture appreciated on the provided images.  Specifically, the zygomatic arches, pterygoid plates, hard palate, and orbital floors/rims are intact.The visualized bony calvarium is unremarkable.    Sinuses: Mild nodular mucoperiosteal thickening of the right maxillary sinus.  Left maxillary sinus is well aerated.  Minimal dependent mucoperiosteal thickening of the frontal sinus.  Sphenoid sinuses well aerated.  Scattered mucoperiosteal thickening throughout the ethmoid air cells.  There is leftward deviation of the nasal septum.  Mild to moderate turbinate hypertrophy.    Orbits: The extraocular muscles, globes, and optic nerve sheath complexes are normal and symmetric in appearance.No orbital fat stranding or orbital emphysema.No orbital mass.    Mastoid air cells and middle ears: The visualized mastoid air cells are clear without evidence of mastoiditis.  No petrous temporal bone fracture.The left and right middle ear are unremarkable.    Incidentally observed soft tissues of the neck: No significant soft tissue abnormalities  appreciated.                                       CT Head Without Contrast (Final result)  Result time 08/19/24 14:29:05      Final result by Tommy De La O MD (08/19/24 14:29:05)                   Impression:      Cortical atrophy with periventricular deep white matter change consistent with chronic small vessel ischemic disease.    Small left frontal cephalohematoma.      Electronically signed by: Tommy De La O  Date:    08/19/2024  Time:    14:29               Narrative:    EXAMINATION:  CT HEAD WITHOUT CONTRAST    CLINICAL HISTORY:  Head trauma, moderate-severe;    TECHNIQUE:  Low dose axial images were obtained through the head.  Coronal and sagittal reformations were also performed. Contrast was not administered.    COMPARISON:  CT 06/03/2022.    FINDINGS:  There is no acute hemorrhage or infarction.  There is cortical atrophy.  There are periventricular deep white matter changes consistent with chronic small vessel ischemic disease.    No extra-axial fluid collections.  Ventricles are normal in size, shape and configuration.  The basal cisterns are patent.    The imaged paranasal sinuses are well aerated.  Scattered mucoperiosteal thickening within the ethmoid air cells.    The mastoid air cells and middle ears are normally pneumatized.    Small left frontal cephalohematoma.  No underlying bony fracture.                                     The emergency physician reviewed the vital signs and test results, which are outlined above.     ED Discussion      Discussed seizure precautions. Keppra level sent off; follow up with epileptologist.     Patient's evaluation in the ED does not suggest any emergent or life-threatening medical conditions requiring immediate intervention beyond what was provided in the ED, and I believe patient is safe for discharge. Regardless, an unremarkable evaluation in the ED does not preclude the development or presence of a serious or life-threatening condition. As such,  patient was given return instructions for any change or worsening of symptoms.       ED Medication(s) Administered:  Medications   neomycin-bacitracnZn-polymyxnB packet 1 each (1 each Topical (Top) Given 8/19/24 1451)   levETIRAcetam injection 1,000 mg (1,000 mg Intravenous Given 8/19/24 1451)       Prescription Management: I performed a review of the patient's current Rx medication list as input by nursing staff.    Discharge Medication List as of 8/19/2024  2:42 PM        CONTINUE these medications which have NOT CHANGED    Details   amLODIPine (NORVASC) 2.5 MG tablet TAKE 1 TABLET BY MOUTH TWICE A DAY, Normal      aspirin (ECOTRIN) 81 MG EC tablet Take 81 mg by mouth once daily., Historical Med      chlordiazepoxide (LIBRIUM) 25 MG Cap Take 1 capsule (25 mg total) by mouth 2 (two) times a day., Starting Fri 6/3/2022, Until Sun 7/3/2022, Print      metoprolol succinate (TOPROL-XL) 25 MG 24 hr tablet TAKE 1 TABLET BY MOUTH EVERY DAY, Normal      pantoprazole (PROTONIX) 40 MG tablet Starting Tue 10/9/2018, Historical Med               Follow-up Information       Schedule an appointment as soon as possible for a visit  with Ishmael Izquierdo MD.    Specialty: Family Medicine  Contact information:  [address]  [facility] [address] [phone]               [facility] Emergency Dept.    Specialty: Emergency Medicine  Why: As needed, If symptoms worsen  Contact information:  [address] [address] [phone]                          Clinical Impression       ICD-10-CM ICD-9-CM   1. Breakthrough seizure  G40.919 345.91   2. Syncope  R55 780.2   3. LOC (loss of consciousness)  R40.20 780.09   4. Traumatic hematoma of forehead, initial encounter  S00.83XA 920      ED Disposition Condition    Discharge Stable             Omar Waller MD  08/19/24 3506

## 2024-08-20 LAB
OHS QRS DURATION: 100 MS
OHS QTC CALCULATION: 475 MS

## 2024-08-22 LAB — LEVETIRACETAM SERPL-MCNC: 12.4 UG/ML (ref 3–60)

## 2025-06-09 ENCOUNTER — HOSPITAL ENCOUNTER (INPATIENT)
Facility: HOSPITAL | Age: 60
LOS: 2 days | Discharge: NURSING, REHAB OR LONG-TERM CARE | DRG: 065 | End: 2025-06-11
Attending: STUDENT IN AN ORGANIZED HEALTH CARE EDUCATION/TRAINING PROGRAM | Admitting: STUDENT IN AN ORGANIZED HEALTH CARE EDUCATION/TRAINING PROGRAM
Payer: COMMERCIAL

## 2025-06-09 DIAGNOSIS — Z97.8 NASOGASTRIC TUBE PRESENT: ICD-10-CM

## 2025-06-09 DIAGNOSIS — F10.10 ALCOHOL ABUSE: ICD-10-CM

## 2025-06-09 DIAGNOSIS — R29.90 STROKE-LIKE SYMPTOM: Primary | ICD-10-CM

## 2025-06-09 DIAGNOSIS — I63.02 STROKE DUE TO THROMBOSIS OF BASILAR ARTERY: ICD-10-CM

## 2025-06-09 DIAGNOSIS — I10 PRIMARY HYPERTENSION: ICD-10-CM

## 2025-06-09 DIAGNOSIS — I63.9 CVA (CEREBRAL VASCULAR ACCIDENT): ICD-10-CM

## 2025-06-09 DIAGNOSIS — Z01.89 ENCOUNTER FOR IMAGING STUDY TO CONFIRM NASOGASTRIC TUBE PLACEMENT: ICD-10-CM

## 2025-06-09 DIAGNOSIS — R29.818 ACUTE FOCAL NEUROLOGICAL DEFICIT: ICD-10-CM

## 2025-06-09 PROBLEM — G40.909 SEIZURE DISORDER: Status: ACTIVE | Noted: 2025-06-09

## 2025-06-09 LAB
ABSOLUTE EOSINOPHIL (OHS): 0.17 K/UL
ABSOLUTE MONOCYTE (OHS): 0.37 K/UL (ref 0.3–1)
ABSOLUTE NEUTROPHIL COUNT (OHS): 3.85 K/UL (ref 1.8–7.7)
ALBUMIN SERPL BCP-MCNC: 3.5 G/DL (ref 3.5–5.2)
ALP SERPL-CCNC: 116 UNIT/L (ref 40–150)
ALT SERPL W/O P-5'-P-CCNC: 20 UNIT/L (ref 10–44)
ANION GAP (OHS): 15 MMOL/L (ref 8–16)
AORTIC ROOT ANNULUS: 3.3 CM
AORTIC VALVE CUSP SEPERATION: 1.03 CM
APICAL FOUR CHAMBER EJECTION FRACTION: 64 %
APICAL TWO CHAMBER EJECTION FRACTION: 62 %
AST SERPL-CCNC: 26 UNIT/L (ref 11–45)
AV INDEX (PROSTH): 0.56
AV MEAN GRADIENT: 3 MMHG
AV PEAK GRADIENT: 7 MMHG
AV REGURGITATION PRESSURE HALF TIME: 1635 MS
AV VALVE AREA BY VELOCITY RATIO: 1.7 CM²
AV VALVE AREA: 1.8 CM²
AV VELOCITY RATIO: 0.54
BASOPHILS # BLD AUTO: 0.08 K/UL
BASOPHILS NFR BLD AUTO: 1.5 %
BILIRUB SERPL-MCNC: 0.6 MG/DL (ref 0.1–1)
BSA FOR ECHO PROCEDURE: 1.85 M2
BUN SERPL-MCNC: 13 MG/DL (ref 6–20)
CALCIUM SERPL-MCNC: 8.8 MG/DL (ref 8.7–10.5)
CHLORIDE SERPL-SCNC: 105 MMOL/L (ref 95–110)
CHOLEST SERPL-MCNC: 163 MG/DL (ref 120–199)
CHOLEST/HDLC SERPL: 1.8 {RATIO} (ref 2–5)
CO2 SERPL-SCNC: 20 MMOL/L (ref 23–29)
CREAT SERPL-MCNC: 0.9 MG/DL (ref 0.5–1.4)
CV ECHO LV RWT: 0.36 CM
DOP CALC AO PEAK VEL: 1.3 M/S
DOP CALC AO VTI: 25.2 CM
DOP CALC LVOT AREA: 3.1 CM2
DOP CALC LVOT DIAMETER: 2 CM
DOP CALC LVOT PEAK VEL: 0.7 M/S
DOP CALC LVOT STROKE VOLUME: 44.6 CM3
DOP CALC MV VTI: 21.1 CM
DOP CALCLVOT PEAK VEL VTI: 14.2 CM
E WAVE DECELERATION TIME: 239 MSEC
E/A RATIO: 0.77
E/E' RATIO: 9 M/S
EAG (OHS): 103 MG/DL (ref 68–131)
ECHO LV POSTERIOR WALL: 0.9 CM (ref 0.6–1.1)
EJECTION FRACTION: 56 %
ERYTHROCYTE [DISTWIDTH] IN BLOOD BY AUTOMATED COUNT: 12.4 % (ref 11.5–14.5)
FRACTIONAL SHORTENING: 26 % (ref 28–44)
GFR SERPLBLD CREATININE-BSD FMLA CKD-EPI: >60 ML/MIN/1.73/M2
GLOBAL LONGITUIDAL STRAIN: 19.4 %
GLUCOSE SERPL-MCNC: 109 MG/DL (ref 70–110)
HBA1C MFR BLD: 5.2 % (ref 4–5.6)
HCT VFR BLD AUTO: 41.7 % (ref 40–54)
HDLC SERPL-MCNC: 90 MG/DL (ref 40–75)
HDLC SERPL: 55.2 % (ref 20–50)
HGB BLD-MCNC: 14.6 GM/DL (ref 14–18)
IMM GRANULOCYTES # BLD AUTO: 0.02 K/UL (ref 0–0.04)
IMM GRANULOCYTES NFR BLD AUTO: 0.4 % (ref 0–0.5)
INR PPP: 1 (ref 0.8–1.2)
INTERVENTRICULAR SEPTUM: 0.9 CM (ref 0.6–1.1)
IVC DIAMETER: 1.23 CM
LA MAJOR: 3 CM
LA MINOR: 2.6 CM
LDLC SERPL CALC-MCNC: 40.6 MG/DL (ref 63–159)
LEFT ATRIUM AREA SYSTOLIC (APICAL 2 CHAMBER): 10.31 CM2
LEFT ATRIUM AREA SYSTOLIC (APICAL 4 CHAMBER): 10.86 CM2
LEFT ATRIUM SIZE: 3.2 CM
LEFT ATRIUM VOLUME INDEX MOD: 12 ML/M2
LEFT ATRIUM VOLUME MOD: 22 ML
LEFT INTERNAL DIMENSION IN SYSTOLE: 3.7 CM (ref 2.1–4)
LEFT VENTRICLE DIASTOLIC VOLUME INDEX: 62.9 ML/M2
LEFT VENTRICLE DIASTOLIC VOLUME: 117 ML
LEFT VENTRICLE END DIASTOLIC VOLUME APICAL 2 CHAMBER: 59.14 ML
LEFT VENTRICLE END DIASTOLIC VOLUME APICAL 4 CHAMBER: 70.01 ML
LEFT VENTRICLE END SYSTOLIC VOLUME APICAL 2 CHAMBER: 21.59 ML
LEFT VENTRICLE END SYSTOLIC VOLUME APICAL 4 CHAMBER: 21.84 ML
LEFT VENTRICLE MASS INDEX: 85.1 G/M2
LEFT VENTRICLE SYSTOLIC VOLUME INDEX: 31.7 ML/M2
LEFT VENTRICLE SYSTOLIC VOLUME: 59 ML
LEFT VENTRICULAR INTERNAL DIMENSION IN DIASTOLE: 5 CM (ref 3.5–6)
LEFT VENTRICULAR MASS: 158.2 G
LV LATERAL E/E' RATIO: 7.3 M/S
LV SEPTAL E/E' RATIO: 11.6 M/S
LVED V (TEICH): 116.97 ML
LVES V (TEICH): 59.29 ML
LVOT MG: 1.23 MMHG
LVOT MV: 0.53 CM/S
LYMPHOCYTES # BLD AUTO: 0.81 K/UL (ref 1–4.8)
MCH RBC QN AUTO: 31.8 PG (ref 27–31)
MCHC RBC AUTO-ENTMCNC: 35 G/DL (ref 32–36)
MCV RBC AUTO: 91 FL (ref 82–98)
MV MEAN GRADIENT: 1 MMHG
MV PEAK A VEL: 0.75 M/S
MV PEAK E VEL: 0.58 M/S
MV PEAK GRADIENT: 3 MMHG
MV STENOSIS PRESSURE HALF TIME: 69.28 MS
MV VALVE AREA BY CONTINUITY EQUATION: 2.11 CM2
MV VALVE AREA P 1/2 METHOD: 3.18 CM2
NONHDLC SERPL-MCNC: 73 MG/DL
NUCLEATED RBC (/100WBC) (OHS): 0 /100 WBC
OHS CV RV/LV RATIO: 0.52 CM
OHS LV EJECTION FRACTION SIMPSONS BIPLANE MOD: 64 %
OHS QRS DURATION: 100 MS
OHS QRS DURATION: 110 MS
OHS QTC CALCULATION: 503 MS
OHS QTC CALCULATION: 527 MS
PISA AR MAX VEL: 2.7 M/S
PISA TR MAX VEL: 2.1 M/S
PLATELET # BLD AUTO: 219 K/UL (ref 150–450)
PMV BLD AUTO: 9.8 FL (ref 9.2–12.9)
POCT GLUCOSE: 103 MG/DL (ref 70–110)
POTASSIUM SERPL-SCNC: 3.8 MMOL/L (ref 3.5–5.1)
PROT SERPL-MCNC: 6.9 GM/DL (ref 6–8.4)
PROTHROMBIN TIME: 11.3 SECONDS (ref 9–12.5)
PV MV: 0.52 M/S
PV PEAK GRADIENT: 2 MMHG
PV PEAK VELOCITY: 0.77 M/S
RA MAJOR: 3.55 CM
RA PRESSURE ESTIMATED: 3 MMHG
RA WIDTH: 2.41 CM
RBC # BLD AUTO: 4.59 M/UL (ref 4.6–6.2)
RELATIVE EOSINOPHIL (OHS): 3.2 %
RELATIVE LYMPHOCYTE (OHS): 15.3 % (ref 18–48)
RELATIVE MONOCYTE (OHS): 7 % (ref 4–15)
RELATIVE NEUTROPHIL (OHS): 72.6 % (ref 38–73)
RIGHT VENTRICLE DIASTOLIC BASEL DIMENSION: 2.6 CM
RIGHT VENTRICLE DIASTOLIC LENGTH: 6 CM
RIGHT VENTRICLE DIASTOLIC MID DIMENSION: 1.9 CM
RIGHT VENTRICULAR LENGTH IN DIASTOLE (APICAL 4-CHAMBER VIEW): 5.97 CM
RV MID DIAMA: 1.89 CM
RV TB RVSP: 5 MMHG
SODIUM SERPL-SCNC: 140 MMOL/L (ref 136–145)
TDI LATERAL: 0.08 M/S
TDI SEPTAL: 0.05 M/S
TDI: 0.07 M/S
TR MAX PG: 17 MMHG
TRICUSPID ANNULAR PLANE SYSTOLIC EXCURSION: 1.7 CM
TRIGL SERPL-MCNC: 162 MG/DL (ref 30–150)
TSH SERPL-ACNC: 2.81 UIU/ML (ref 0.4–4)
TV REST PULMONARY ARTERY PRESSURE: 21 MMHG
WBC # BLD AUTO: 5.3 K/UL (ref 3.9–12.7)
Z-SCORE OF LEFT VENTRICULAR DIMENSION IN END DIASTOLE: -0.34
Z-SCORE OF LEFT VENTRICULAR DIMENSION IN END SYSTOLE: 1.17

## 2025-06-09 PROCEDURE — 92611 MOTION FLUOROSCOPY/SWALLOW: CPT

## 2025-06-09 PROCEDURE — 70551 MRI BRAIN STEM W/O DYE: CPT | Mod: TC

## 2025-06-09 PROCEDURE — 92610 EVALUATE SWALLOWING FUNCTION: CPT

## 2025-06-09 PROCEDURE — 74230 X-RAY XM SWLNG FUNCJ C+: CPT | Mod: TC

## 2025-06-09 PROCEDURE — 25000003 PHARM REV CODE 250: Performed by: STUDENT IN AN ORGANIZED HEALTH CARE EDUCATION/TRAINING PROGRAM

## 2025-06-09 PROCEDURE — 36415 COLL VENOUS BLD VENIPUNCTURE: CPT | Performed by: NURSE PRACTITIONER

## 2025-06-09 PROCEDURE — 97162 PT EVAL MOD COMPLEX 30 MIN: CPT

## 2025-06-09 PROCEDURE — 25000003 PHARM REV CODE 250: Performed by: EMERGENCY MEDICINE

## 2025-06-09 PROCEDURE — 97166 OT EVAL MOD COMPLEX 45 MIN: CPT

## 2025-06-09 PROCEDURE — 25000003 PHARM REV CODE 250: Performed by: NURSE PRACTITIONER

## 2025-06-09 PROCEDURE — 85025 COMPLETE CBC W/AUTO DIFF WBC: CPT | Performed by: STUDENT IN AN ORGANIZED HEALTH CARE EDUCATION/TRAINING PROGRAM

## 2025-06-09 PROCEDURE — 83036 HEMOGLOBIN GLYCOSYLATED A1C: CPT | Performed by: NURSE PRACTITIONER

## 2025-06-09 PROCEDURE — 93010 ELECTROCARDIOGRAM REPORT: CPT | Mod: ,,, | Performed by: INTERNAL MEDICINE

## 2025-06-09 PROCEDURE — 80061 LIPID PANEL: CPT | Performed by: STUDENT IN AN ORGANIZED HEALTH CARE EDUCATION/TRAINING PROGRAM

## 2025-06-09 PROCEDURE — 93005 ELECTROCARDIOGRAM TRACING: CPT | Performed by: INTERNAL MEDICINE

## 2025-06-09 PROCEDURE — 25500020 PHARM REV CODE 255: Performed by: STUDENT IN AN ORGANIZED HEALTH CARE EDUCATION/TRAINING PROGRAM

## 2025-06-09 PROCEDURE — 84443 ASSAY THYROID STIM HORMONE: CPT | Performed by: STUDENT IN AN ORGANIZED HEALTH CARE EDUCATION/TRAINING PROGRAM

## 2025-06-09 PROCEDURE — 80053 COMPREHEN METABOLIC PANEL: CPT | Performed by: STUDENT IN AN ORGANIZED HEALTH CARE EDUCATION/TRAINING PROGRAM

## 2025-06-09 PROCEDURE — 70498 CT ANGIOGRAPHY NECK: CPT | Mod: 26,,, | Performed by: SPECIALIST

## 2025-06-09 PROCEDURE — 85610 PROTHROMBIN TIME: CPT | Performed by: STUDENT IN AN ORGANIZED HEALTH CARE EDUCATION/TRAINING PROGRAM

## 2025-06-09 PROCEDURE — 94760 N-INVAS EAR/PLS OXIMETRY 1: CPT

## 2025-06-09 PROCEDURE — 82962 GLUCOSE BLOOD TEST: CPT

## 2025-06-09 PROCEDURE — 74018 RADEX ABDOMEN 1 VIEW: CPT | Mod: TC

## 2025-06-09 PROCEDURE — 70496 CT ANGIOGRAPHY HEAD: CPT | Mod: TC

## 2025-06-09 PROCEDURE — 27000221 HC OXYGEN, UP TO 24 HOURS

## 2025-06-09 PROCEDURE — 21400001 HC TELEMETRY ROOM

## 2025-06-09 PROCEDURE — 70450 CT HEAD/BRAIN W/O DYE: CPT | Mod: 26,59,, | Performed by: SPECIALIST

## 2025-06-09 PROCEDURE — G0427 INPT/ED TELECONSULT70: HCPCS | Mod: GT,,, | Performed by: STUDENT IN AN ORGANIZED HEALTH CARE EDUCATION/TRAINING PROGRAM

## 2025-06-09 PROCEDURE — 99223 1ST HOSP IP/OBS HIGH 75: CPT | Mod: ,,, | Performed by: NURSE PRACTITIONER

## 2025-06-09 PROCEDURE — 70450 CT HEAD/BRAIN W/O DYE: CPT | Mod: TC

## 2025-06-09 PROCEDURE — 99285 EMERGENCY DEPT VISIT HI MDM: CPT | Mod: 25

## 2025-06-09 PROCEDURE — 70496 CT ANGIOGRAPHY HEAD: CPT | Mod: 26,,, | Performed by: SPECIALIST

## 2025-06-09 PROCEDURE — 74018 RADEX ABDOMEN 1 VIEW: CPT | Mod: 26,,, | Performed by: RADIOLOGY

## 2025-06-09 PROCEDURE — 92523 SPEECH SOUND LANG COMPREHEN: CPT

## 2025-06-09 RX ORDER — LEVETIRACETAM 500 MG/1
500 TABLET ORAL 2 TIMES DAILY
Status: ON HOLD | COMMUNITY
Start: 2025-05-27

## 2025-06-09 RX ORDER — THIAMINE HCL 100 MG
100 TABLET ORAL DAILY
Status: DISCONTINUED | OUTPATIENT
Start: 2025-06-10 | End: 2025-06-11 | Stop reason: HOSPADM

## 2025-06-09 RX ORDER — LIDOCAINE HYDROCHLORIDE 20 MG/ML
5 SOLUTION OROPHARYNGEAL
Status: COMPLETED | OUTPATIENT
Start: 2025-06-09 | End: 2025-06-09

## 2025-06-09 RX ORDER — CLOPIDOGREL BISULFATE 75 MG/1
75 TABLET ORAL DAILY
Status: DISCONTINUED | OUTPATIENT
Start: 2025-06-09 | End: 2025-06-09

## 2025-06-09 RX ORDER — SODIUM CHLORIDE 9 MG/ML
INJECTION, SOLUTION INTRAVENOUS CONTINUOUS
Status: DISCONTINUED | OUTPATIENT
Start: 2025-06-09 | End: 2025-06-11

## 2025-06-09 RX ORDER — NAPROXEN SODIUM 220 MG/1
81 TABLET, FILM COATED ORAL DAILY
Status: DISCONTINUED | OUTPATIENT
Start: 2025-06-09 | End: 2025-06-11 | Stop reason: HOSPADM

## 2025-06-09 RX ORDER — LEVETIRACETAM 250 MG/1
500 TABLET ORAL 2 TIMES DAILY
Status: DISCONTINUED | OUTPATIENT
Start: 2025-06-09 | End: 2025-06-11 | Stop reason: HOSPADM

## 2025-06-09 RX ORDER — TOPICAL ANESTHETIC 200 MG/ML
SPRAY DENTAL; PERIODONTAL
Status: COMPLETED | OUTPATIENT
Start: 2025-06-09 | End: 2025-06-09

## 2025-06-09 RX ORDER — LABETALOL HCL 20 MG/4 ML
10 SYRINGE (ML) INTRAVENOUS
Status: DISCONTINUED | OUTPATIENT
Start: 2025-06-09 | End: 2025-06-11 | Stop reason: HOSPADM

## 2025-06-09 RX ORDER — AMLODIPINE BESYLATE 2.5 MG/1
5 TABLET ORAL DAILY
Status: ON HOLD | COMMUNITY

## 2025-06-09 RX ORDER — BISACODYL 10 MG/1
10 SUPPOSITORY RECTAL DAILY PRN
Status: DISCONTINUED | OUTPATIENT
Start: 2025-06-09 | End: 2025-06-11 | Stop reason: HOSPADM

## 2025-06-09 RX ORDER — ATORVASTATIN CALCIUM 40 MG/1
80 TABLET, FILM COATED ORAL DAILY
Status: DISCONTINUED | OUTPATIENT
Start: 2025-06-09 | End: 2025-06-11 | Stop reason: HOSPADM

## 2025-06-09 RX ORDER — HEPARIN SODIUM 5000 [USP'U]/ML
5000 INJECTION, SOLUTION INTRAVENOUS; SUBCUTANEOUS EVERY 8 HOURS
Status: DISCONTINUED | OUTPATIENT
Start: 2025-06-09 | End: 2025-06-09

## 2025-06-09 RX ORDER — SODIUM CHLORIDE 0.9 % (FLUSH) 0.9 %
10 SYRINGE (ML) INJECTION
Status: DISCONTINUED | OUTPATIENT
Start: 2025-06-09 | End: 2025-06-11 | Stop reason: HOSPADM

## 2025-06-09 RX ORDER — CLOPIDOGREL BISULFATE 75 MG/1
75 TABLET ORAL DAILY
Status: DISCONTINUED | OUTPATIENT
Start: 2025-06-10 | End: 2025-06-11 | Stop reason: HOSPADM

## 2025-06-09 RX ORDER — ONDANSETRON HYDROCHLORIDE 2 MG/ML
4 INJECTION, SOLUTION INTRAVENOUS EVERY 12 HOURS PRN
Status: DISCONTINUED | OUTPATIENT
Start: 2025-06-09 | End: 2025-06-11 | Stop reason: HOSPADM

## 2025-06-09 RX ORDER — CLOPIDOGREL BISULFATE 75 MG/1
300 TABLET ORAL ONCE
Status: COMPLETED | OUTPATIENT
Start: 2025-06-09 | End: 2025-06-09

## 2025-06-09 RX ORDER — POTASSIUM CHLORIDE 750 MG/1
10 TABLET, EXTENDED RELEASE ORAL DAILY
COMMUNITY
Start: 2025-01-11 | End: 2025-06-09 | Stop reason: CLARIF

## 2025-06-09 RX ORDER — POTASSIUM CHLORIDE 750 MG/1
10 CAPSULE, EXTENDED RELEASE ORAL DAILY
Status: ON HOLD | COMMUNITY

## 2025-06-09 RX ORDER — FOLIC ACID 1 MG/1
1 TABLET ORAL DAILY
Status: DISCONTINUED | OUTPATIENT
Start: 2025-06-10 | End: 2025-06-11 | Stop reason: HOSPADM

## 2025-06-09 RX ORDER — ASPIRIN 300 MG/1
300 SUPPOSITORY RECTAL
Status: COMPLETED | OUTPATIENT
Start: 2025-06-09 | End: 2025-06-09

## 2025-06-09 RX ADMIN — TOPICAL ANESTHETIC 1 EACH: 200 SPRAY DENTAL; PERIODONTAL at 08:06

## 2025-06-09 RX ADMIN — CLOPIDOGREL 300 MG: 75 TABLET ORAL at 09:06

## 2025-06-09 RX ADMIN — ASPIRIN 300 MG: 300 SUPPOSITORY RECTAL at 05:06

## 2025-06-09 RX ADMIN — SODIUM CHLORIDE: 9 INJECTION, SOLUTION INTRAVENOUS at 09:06

## 2025-06-09 RX ADMIN — LEVETIRACETAM 500 MG: 250 TABLET, FILM COATED ORAL at 08:06

## 2025-06-09 RX ADMIN — LIDOCAINE HYDROCHLORIDE 5 ML: 20 SOLUTION ORAL at 08:06

## 2025-06-09 RX ADMIN — LEVETIRACETAM 500 MG: 250 TABLET, FILM COATED ORAL at 09:06

## 2025-06-09 RX ADMIN — ASPIRIN 81 MG: 81 TABLET, CHEWABLE ORAL at 09:06

## 2025-06-09 RX ADMIN — ATORVASTATIN CALCIUM 80 MG: 40 TABLET, FILM COATED ORAL at 09:06

## 2025-06-09 RX ADMIN — IOHEXOL 100 ML: 350 INJECTION, SOLUTION INTRAVENOUS at 04:06

## 2025-06-09 NOTE — PT/OT/SLP EVAL
Physical Therapy Evaluation    Patient Name:  Drew Kaur   MRN:  [id number]    Recommendations:     Discharge Recommendations: High Intensity Therapy (Inpatient Rehab)   Discharge Equipment Recommendations:  (TBD)   Barriers to discharge: Ongoing medical treatment, increased level of assistance    Assessment:     Drew Kaur is a 60 y.o. male admitted with a medical diagnosis of Stroke.  He presents with the following impairments/functional limitations: weakness, impaired functional mobility, gait instability, impaired balance, decreased coordination, decreased upper extremity function, decreased lower extremity function, abnormal tone.    The patient presented to the emergency department on 6/9/25 with right sided weakness, right facial droop, whispered speech, and expressive aphasia.      Rehab Prognosis: Good; patient would benefit from acute skilled PT services to address these deficits and reach maximum level of function.    Recent Surgery: * No surgery found *      Plan:     During this hospitalization, patient to be seen 5 x/week to address the identified rehab impairments via gait training, therapeutic activities, therapeutic exercises and progress toward the following goals:    Plan of Care Expires:  06/23/25    Subjective     Chief Complaint: The patient initially did not speak.  However, he was able to say a few words with a low voice.    Patient/Family Comments/goals: The patient would like to return to independent mobility.  Pain/Comfort:  Pain Rating 1: 0/10    Patients cultural, spiritual, Christianity conflicts given the current situation: no    Living Environment:  The patient lives with his wife in a single story home with 3 steps and a handrail at the entrance to the residence.  Prior to admission, patients level of function was independent.  He is still employed.  Equipment used at home: none.  DME owned (not currently used): none.  Upon discharge, patient will have assistance from  his wife.    Objective:     Communicated with nurse prior to session.  Patient found HOB elevated with telemetry, PureWick, peripheral IV  upon PT entry to room.    General Precautions: Standard, fall  Orthopedic Precautions:N/A   Braces: UE Sling  Respiratory Status: Room air    Exams:  Cognitive Exam:  Patient is oriented to Person, Place, Time, Situation.  The patient was able to speak a few words but it was with a low voice.  His receptive language seems to be intact.  Unable to determine if he has expressive aphasia because he did not speak enough today.  Sensation: Patient complains of numbness in the right hand.  RLE ROM: WFL  RLE Strength: Hip Abd/Add - 2/5, Hip Flexion - Trace, Ankle - 0/5  LLE ROM: WFL  LLE Strength: WFL    Functional Mobility:  Bed Mobility:     Supine to Sit: moderate assistance and of 2 persons  Sit to Supine: moderate assistance and of 2 persons  Transfers:     Sit to Stand:  maximal assistance and of 3 persons with no AD but was still unable to come to a full stand  Gait: Unable      AM-PAC 6 CLICK MOBILITY  Total Score:9       Treatment & Education:  Patient educated on role of acute care physical therapy, the physical therapy plan of care, and the goals of physical therapy.  Patient educated on safety while in hospital including calling nurse for mobility, and call light usage.        Patient left HOB elevated with all lines intact, call button in reach, nurse notified, and wife present.    GOALS:   Multidisciplinary Problems       Physical Therapy Goals          Problem: Physical Therapy    Goal Priority Disciplines Outcome Interventions   Physical Therapy Goal     PT, PT/OT Ongoing    Description: Goals    Patient to increase right lower extremity strength by 1/2 muscle grade.  Patient to roll left and right with min assistance.  Patient to transfer supine <> sit with min assistance.  Patient to transfer bed <> chair via stand-pivot with mod assistance.  Patient to ambulate with  most appropriate AD  and mod assistance > 15 feet.                         DME Justifications:  No DME recommended requiring DME justifications    History:     Past Medical History:   Diagnosis Date    GERD (gastroesophageal reflux disease)     Hypertension     Seizures        Past Surgical History:   Procedure Laterality Date    EYE SURGERY         Time Tracking:     PT Received On: 06/09/25  PT Start Time: 1338     PT Stop Time: 1410  PT Total Time (min): 32 min     Billable Minutes: Evaluation 32      06/09/2025

## 2025-06-09 NOTE — PT/OT/SLP EVAL
Occupational Therapy Evaluation     Name: Drew Kaur  MRN: [id number]  Admitting Diagnosis: Stroke  Recent Surgery: * No surgery found *      Recommendations:     Discharge Recommendations: High Intensity Therapy (IPR)  Discharge Equipment Recommendations:  (TBD)  Barriers to discharge:  (ongoing medical treatment, impaired ADL)    Assessment:     Drew Kaur is a 60 y.o. male with a medical diagnosis of Stroke. He presents with performance deficits affecting function including weakness, impaired endurance, impaired sensation, impaired self care skills, impaired functional mobility, impaired balance, decreased coordination, decreased upper extremity function, decreased lower extremity function, impaired fine motor, impaired coordination, decreased ROM, abnormal tone.     He presented to the ER with right-sided deficits and expressive aphasia. Patient went to bed around 9:00 p.m. which was his last known well time. When he awoke around 2:00 a.m. he was unable to move his right side. He woke up his wife and 911 was called. Code stroke was activated in the ED. CT of the head was obtained noting chronic changes but no evidence for acute intracranial process. CT of the head neck revealed focal nonocclusive intraluminal thrombus of the basal artery, chronic versus acute occlusion of the V4 segment of right vertebral artery.     Rehab Prognosis: Good; patient would benefit from acute OT services to address these deficits and reach maximum level of function.      Plan:     Patient to be seen  (5x/week) to address the above listed problems via self-care/home management, therapeutic activities, therapeutic exercises, neuromuscular re-education, sensory integration  Plan of Care Expires: 06/23/25  Plan of Care Reviewed with: spouse, patient (RN, NP)    Subjective     Chief Complaint: Patient and wife concerned for his recovery prognosis.   Patient Comments/Goals: To regain independence  Pain/Comfort:  Pain  Rating 1: 0/10      Social History:  Living Environment: Patient lives with their spouse in a single story home with number of outside stair(s): 3, number of inside stair(s): 1, and tub-shower combo  Prior Level of Function: Prior to admission, patient was independent.  Roles and Routines: Patient was driving and working prior to admission.  Equipment Used at Home: none  DME owned (not currently used): none  Assistance Upon Discharge: significant other    Objective:     Communicated with nurse prior to session. Patient found HOB elevated with  purewick, peripheral IV, telemetry upon OT entry to room.    General Precautions: Standard, aspiration, honey thick, pureed diet, seizure  Braces: UE Sling (for all OOB activity)    Respiratory Status: Room air    Cognitive and Psychosocial Function:  Oriented to: Person, Place, Time, Situation  Follows Commands/Attention: follows multi-step commands  Mood/Affect/Coping Skills/Emotional Control: Appropriate to situation  Continue to assess cognition, no deficits were noticed during evaluation, but his communication is limited    Hearing: Intact    Vision: No deficits noted      Physical Exam:  Postural examination/scapula alignment: R 1 finger subluxation noted. Communicated to NP and nurse, R UE sling ordered and educated wife and nurse on recommended wear schedule and positioning in bed  Skin integrity: Visible skin intact          Left UE Right UE   UE Edema None noted None noted   UE ROM AROM WFL AROM for shoulder elevation and depression WFL, all else no active movement noted and PROM WFL   UE Strength 5/5 1/5 at shoulder flexors and extensors, 0/5 at forearm/elbow through digits    Strength grasp WFL no composite grasp present   Sensation LUE  INTACT:  WFL RUE  IMPAIRED:  light/touch, patient indicating his hand is numb, but he is able to localize touch upon testing. Continue to assess.   Fine Motor Coordination:  LUE  INTACT:  WFL RUE  IMPAIRED:    hand, finger to  nose, hand thumb/finger opposition skills, manipulation of objects, graphomotor skills, and diadochokinesis skill   Gross Motor Coordination: LUE  INTACT:  WFL RUE  INTACT:  hemiplegia/paresis     Occupational Performance    Bed Mobility:   Rolling/Turning to Left with moderate assistance  Rolling/Turning to Right with stand by assistance  Scooting to HOB in supine: minimum assistance  Scooting anteriorly to EOB to have both feet planted on floor: moderate assistance  Supine to sit from left side of bed with moderate assistance x 2    Functional Mobility/Transfers  Static Sitting EOB: contact guard assistance to min A  Dynamic Sitting EOB: maximal assistance  Static Standing Balance: maximal assistance and of 2 persons, he is unable to achieve full upright position  Dynamic Standing Balance: did not test    At EOB with time and cues, he is able to achieve midline briefly from min A progressing to CGA.      Activities of Daily Living:  Upper Body Dressing: maximal assistance  Lower Body Dressing: maximal assistance  Continue to assess feeding    AMPAC 6 Click ADL:  AMPAC Total Score: 12    Treatment & Education:  Therapist provided facilitation and instruction of proper body mechanics, energy conservation, and fall prevention strategies during tasks listed above.  Patient educated on role of OT, POC, and goals for therapy.      Patient left HOB elevated with all lines intact, call button in reach, and bolstering at R UE.    GOALS:   Multidisciplinary Problems       Occupational Therapy Goals          Problem: Occupational Therapy    Goal Priority Disciplines Outcome Interventions   Occupational Therapy Goal     OT, PT/OT Ongoing    Description: Goals to be met by: 6/23/25    Patient will demonstrate increased functional independence with ADLs by performing:    Sitting EOB with SBA x 5 minutes.     Complete stand pivot transfer to chair with mod A x 1 person.    Complete upper body dressing with mod A and compensatory  strategies as needed.                          History:     Past Medical History:   Diagnosis Date    GERD (gastroesophageal reflux disease)     Hypertension     Seizures          Past Surgical History:   Procedure Laterality Date    EYE SURGERY         Time Tracking:     OT Date of Treatment: 06/09/25  OT Start Time: 1339  OT Stop Time: 1410  OT Total Time (min): 31 min    Billable Minutes: Evaluation 31    6/9/2025

## 2025-06-09 NOTE — SUBJECTIVE & OBJECTIVE
Past Medical History:   Diagnosis Date    GERD (gastroesophageal reflux disease)     Hypertension     Seizures        Past Surgical History:   Procedure Laterality Date    EYE SURGERY         Review of patient's allergies indicates:  No Known Allergies    No current facility-administered medications on file prior to encounter.     Current Outpatient Medications on File Prior to Encounter   Medication Sig    amLODIPine (NORVASC) 2.5 MG tablet Take 5 mg by mouth once daily.    aspirin (ECOTRIN) 81 MG EC tablet Take 81 mg by mouth once daily.    levETIRAcetam (KEPPRA) 500 MG Tab Take 500 mg by mouth 2 (two) times daily. Take one tablet in am and 2 tablets at night    pantoprazole (PROTONIX) 40 MG tablet     potassium chloride (MICRO-K) 10 MEQ CpSR Take 10 mEq by mouth once daily.    [DISCONTINUED] potassium chloride (KLOR-CON) 10 MEQ TbSR Take 10 mEq by mouth once daily.    chlordiazepoxide (LIBRIUM) 25 MG Cap Take 1 capsule (25 mg total) by mouth 2 (two) times a day.    [DISCONTINUED] amLODIPine (NORVASC) 2.5 MG tablet TAKE 1 TABLET BY MOUTH TWICE A DAY    [DISCONTINUED] metoprolol succinate (TOPROL-XL) 25 MG 24 hr tablet TAKE 1 TABLET BY MOUTH EVERY DAY     Family History       Problem Relation (Age of Onset)    Arthritis Brother    Diabetes Mother    Heart attack Father    Hypertension Mother, Brother    No Known Problems Sister, Sister, Sister, Brother    Stroke Father          Tobacco Use    Smoking status: Never    Smokeless tobacco: Never   Substance and Sexual Activity    Alcohol use: Yes     Comment: Socially    Drug use: No    Sexual activity: Not Currently     Review of Systems   Constitutional:  Positive for activity change.   Neurological:  Positive for facial asymmetry, speech difficulty and weakness.   All other systems reviewed and are negative.    Objective:     Vital Signs (Most Recent):  Temp: 98.2 °F (36.8 °C) (06/09/25 1100)  Pulse: 74 (06/09/25 1100)  Resp: 18 (06/09/25 1100)  BP: 118/80 (06/09/25  1100)  SpO2: 95 % (06/09/25 1100) Vital Signs (24h Range):  Temp:  [98.2 °F (36.8 °C)] 98.2 °F (36.8 °C)  Pulse:  [63-94] 74  Resp:  [13-34] 18  SpO2:  [93 %-96 %] 95 %  BP: (108-154)/() 118/80     Weight: 69.5 kg (153 lb 3.5 oz)  Body mass index is 21.98 kg/m².     Physical Exam  Constitutional:       General: He is not in acute distress.     Appearance: He is ill-appearing. He is not toxic-appearing.   HENT:      Head: Normocephalic and atraumatic.      Mouth/Throat:      Mouth: Mucous membranes are moist.      Pharynx: Oropharynx is clear.   Eyes:      General: No scleral icterus.     Extraocular Movements: Extraocular movements intact.      Conjunctiva/sclera: Conjunctivae normal.      Pupils: Pupils are equal, round, and reactive to light.   Cardiovascular:      Rate and Rhythm: Normal rate and regular rhythm.      Pulses: Normal pulses.      Heart sounds: Normal heart sounds.   Pulmonary:      Effort: Pulmonary effort is normal. No respiratory distress.      Breath sounds: Normal breath sounds.   Abdominal:      General: There is no distension.      Palpations: Abdomen is soft.   Musculoskeletal:      Cervical back: Normal range of motion and neck supple.      Right lower leg: No edema.      Left lower leg: No edema.      Comments:      Skin:     General: Skin is warm and dry.      Capillary Refill: Capillary refill takes less than 2 seconds.      Coloration: Skin is not jaundiced.   Neurological:      Mental Status: He is alert and oriented to person, place, and time.      Cranial Nerves: Facial asymmetry (right facial droop) present.      Motor: Weakness present.      Comments: Right upper and lower extremity flaccid.  Right  0/5, RLE pedal push 0/5.     Psychiatric:         Behavior: Behavior normal.              CRANIAL NERVES     CN III, IV, VI   Pupils are equal, round, and reactive to light.       Significant Labs: All pertinent labs within the past 24 hours have been reviewed.  CBC:   Recent  Labs   Lab 06/09/25 0423   WBC 5.30   HGB 14.6   HCT 41.7        CMP:   Recent Labs   Lab 06/09/25 0423      K 3.8      CO2 20*      BUN 13   CREATININE 0.9   CALCIUM 8.8   PROT 6.9   ALBUMIN 3.5   BILITOT 0.6   ALKPHOS 116   AST 26   ALT 20   ANIONGAP 15     Lipid Panel:   Recent Labs   Lab 06/09/25 0423   CHOL 163   HDL 90*   LDLCALC 40.6*   TRIG 162*   CHOLHDL 55.2*         Significant Imaging: I have reviewed all pertinent imaging results/findings within the past 24 hours.    XR NG/OG tube placement check, non-radiologist performed [[id number]] Resulted: 06/09/25 0848   Order Status: Completed Updated: 06/09/25 0850   Narrative:     EXAMINATION:  XR NG/OG TUBE PLACEMENT CHECK, NON-RADIOLOGIST PERFORMED    CLINICAL HISTORY:  ng tube;Encounter for other specified special examinations    TECHNIQUE:  Portable view of the chest and upper abdomen.    COMPARISON:  Same day.    FINDINGS:  Nasogastric tube repositioned with the distal tube coiled in the gastric fundus.      Electronically signed by: Tommy De La O  Date: 06/09/2025  Time: 08:48   XR NG/OG tube placement check, non-radiologist performed [[id number]] Resulted: 06/09/25 0847   Order Status: Completed Updated: 06/09/25 0850   Narrative:     EXAMINATION:  XR NG/OG TUBE PLACEMENT CHECK, NON-RADIOLOGIST PERFORMED    CLINICAL HISTORY:  og tube placement;Presence of other specified devices    TECHNIQUE:  Portable view of the chest and upper abdomen.    COMPARISON:  None    FINDINGS:  Nasogastric tube is coiled in the neck and needs to be repositioned.      Electronically signed by: Tommy De La O  Date: 06/09/2025  Time: 08:47   CTA HEAD AND NECK FOR CODE STROKE (XPD) [[id number]] (Abnormal) Resulted: 06/09/25 0516   Order Status: Completed Updated: 06/09/25 0519   Narrative:     EXAMINATION:  CTA head and neck June 9, 2025    CLINICAL HISTORY:  Neuro deficit, acute stroke suspected    TECHNIQUE:  CT a examination of the head and neck  was performed after the administration of 100 mL Omnipaque 350 intravenous contrast.  Axial imaging obtained from above the level the vertex to the level of the superior aspect of the thoracic aortic arch.  Axial MIP reconstruction imaging is submitted.  Sagittal reconstruction imaging is submitted, coronal reconstruction imaging was performed by the radiologist on the radiology workstation at the time of dictation.    COMPARISON:  CT examination of the brain without contrast June 9, 2025    FINDINGS:  There is artifact including mild motion artifact present, this diminishes the sensitivity of the examination.    The visualized superior aspect of the thoracic aortic arch demonstrates appropriate opacification.    The patient appears left vertebral dominant.  There is tortuosity of the proximal segment of the left vertebral artery.  The left vertebral artery demonstrates appropriate opacification throughout its course, atherosclerotic change noted, there is no evidence for high-grade stenosis or occlusion or dissection.    The right vertebral artery appears small in caliber throughout its course, given the left vertebral dominance.  The distal segment of the right vertebral artery, the V4 segment is not well delineated.  Not opacified.  On close evaluation there appears to be a thin structure that would be consistent with a thin small the 4 segment of the distal right vertebral artery, without appreciable opacification and therefore consistent with occlusion, given the small size of the vessel this may be chronic, however acute versus chronic occlusion would be in the differential, clinical correlation is needed.    The basilar artery appears tortuous.  There is a small focal filling defect within the basilar artery at the level of the mid to upper basilar artery, as best seen on axial series 3, image 200 consistent with a small nonocclusive intraluminal thrombus.    The common, external and internal carotid arteries  bilaterally demonstrate appropriate opacification.  The internal carotid arteries demonstrate opacification to the level of the skull base and [id number].  Mild atherosclerotic change noted, there is no evidence for high-grade stenosis or occlusion or dissection.    The anterior cerebral artery and middle cerebral artery bilaterally demonstrate appropriate opacification.  There is a posterior communicating artery on the left.  The posterior cerebral artery bilaterally demonstrates appropriate opacification.  There is no evidence for high-grade stenosis or occlusion, no evidence for intracranial aneurysm or AVM.  The intracranial venous sinuses appear appropriate.    The intracranial appearance is stable when compared to the noncontrast head CT of the same evening.  The visualized orbits appear intact.  Paranasal sinus findings are noted on the head CT.  The mastoid air cells appear well aerated.    There is no evidence for dominant neck mass or cystic collection.  The airway appears patent.  There is no abnormal thickening of the epiglottis.  The osseous structures appear intact.  The lung apices demonstrate appearance of may relate to components of diminished depth of inspiration and atelectasis and chronic change.   Impression:       Focal nonocclusive intraluminal thrombus of the basilar artery, as discussed above.    The patient is left vertebral dominant, the right vertebral artery is diminutive along its course, the V4 segment of the distal right vertebral artery demonstrates lack of opacification, consistent with occlusion, this may relate to chronic occlusion however acute versus chronic occlusion would be in the differential.    The left vertebral artery demonstrates no evidence for high-grade stenosis or occlusion.    The carotid arterial vasculature bilaterally demonstrates no evidence for high-grade stenosis or occlusion.    The remainder of the major intracranial arterial vascular structures  demonstrate no additional evidence for high-grade stenosis or occlusion, and there is no evidence for intracranial aneurysm or AVM.    Additional findings as above.    This report was flagged in Epic as abnormal.    The findings were discussed with Dr. Burnette in the ER prior to dictation.      Electronically signed by: Immanuel Werner  Date: 06/09/2025  Time: 05:16   CT HEAD FOR CODE STROKE [[id number]] Resulted: 06/09/25 0448   Order Status: Completed Updated: 06/09/25 0450   Narrative:     EXAMINATION:  CT HEAD FOR CODE STROKE    CLINICAL HISTORY:  Neuro deficit, acute, stroke suspected;    TECHNIQUE:  Low dose axial images were obtained through the head.  Coronal and sagittal reformations were also performed. Contrast was not administered.    COMPARISON:  CT examination of the brain August 19, 2024    FINDINGS:  There is artifact present, this diminishes the sensitivity of the examination.  When accounting for artifact the appearance of the ventricular system, sulcal pattern and parenchymal attenuation character is consistent with chronic change.  Involutional change and chronic appearing white matter change noted.    There is no evidence for intracranial mass, mass effect or midline shift and there is no evidence for acute intracranial hemorrhage.  Appropriate CSF spaces are seen at the skull base.    The visualized mastoid air cells appear well aerated.  There is a suspected small mucous retention cyst of the right maxillary antrum.  Mild mucosal thickening is noted mild opacity at the mid right ethmoid level.  The visualized orbits appear intact.  The osseous structures demonstrate chronic change.   Impression:       Chronic changes are noted, there is no evidence for acute intracranial process.    Paranasal sinus findings as above.      Electronically signed by: Immanuel Werner  Date: 06/09/2025  Time: 04:48

## 2025-06-09 NOTE — PT/OT/SLP EVAL
Speech Language Pathology Evaluation  Cognitive/Bedside Swallow    Patient Name:  Drew Kaur   MRN:  [id number]  Admitting Diagnosis: Stroke-like symptoms     Recommendations:                  General Recommendations:  Dysphagia therapy, Speech and Language Therapy, Voice therapy, and Modified barium swallow study  Diet recommendations:  NPO, NPO (With the exception of ice chips sparingly administered by RN (oral care performed prior))   Aspiration Precautions: NPO   General Precautions: Standard, aspiration, NPO  Communication strategies:  yes/no questions only and provide increased time to answer    Assessment:     Drew Kaur is a 60 y.o. male with an primary problem of stroke-like symptoms. Per chart review, patient has failed boss and is experiencing expressive aphasia. Wife and sister present at bedside. Patient presented as awake and alert for duration of assessment. NG observed to be in place. Patient's communication was noted as limited 2' to aphonic vocal quality and endurance. It was observed that patient was able to follow simple commands, answer some simple one word wh- questions, and comprehend complex information. Vocal quality noted as reduced. Patient reported he experienced voicing deficits prior to NG placement.     Oral care was performed prior to PO trials. Overt s/s of aspiration observed at bedside with thin liquids 2' to immediate expectorant cough reflex with liquids by spoon, and delayed initiation, multiple swallows with ice chips by spoon. Adequate laryngeal sensation noted 2' to expectorant cough reflex. PO trials discontinued after liquid trials. At this time, further investigation of current oropharyngeal functioning is warranted. ST recommends patient remain NPO pending results of Modified Barium Swallow study. For pleasure, patient may have ice chips sparingly by spoon (administered by RN only, oral care performed prior). ST will continue to follow.      History:      Past Medical History:   Diagnosis Date    GERD (gastroesophageal reflux disease)     Hypertension     Seizures        Past Surgical History:   Procedure Laterality Date    EYE SURGERY         Social History: Patient lives with his wife at home.    Prior diet: regular textures, thin liquids.    Subjective   Patient seated at [facility] in 90 degree position. NG in place. Wife and sister present at bedside. Patient was compliant and agreeable to assessment.     Pain/Comfort:  Pain Rating 1: 0/10    Respiratory Status: Room air    Objective:     Cognitive Status:    Arousal/Alertness Appropriate response to stimuli  Attention No obvious deficits observed    Orientation Oriented x4      Receptive Language:   Comprehension:      WFL    Pragmatics:    topic maintenance WFL and Eye contact WFL    Expressive Language:  Verbal:    Sentence formulation deficit  Conversational speech deficit  Nonverbal:   Facial Expression WFL      Motor Speech:  Intelligibility reduced    Voice:   Quality reduced  Intensity reduced  Aphonia present       Oral Musculature Evaluation  Oral Musculature: right weakness  Dentition: edentulous  Secretion Management: problems swallowing secretions  Mucosal Quality: adequate  Mandibular Strength and Mobility: impaired  Oral Labial Strength and Mobility: impaired retraction, impaired coordination  Lingual Strength and Mobility: impaired strength  Buccal Strength and Mobility: decreased tone (R sided)  Volitional Cough: Reduced  Volitional Swallow: Poor tolerane of secretions 2' to slight wet vocal quality  Voice Prior to PO Intake: Poor tolerancne of secretions 2' to slight wet vocal quality    Bedside Swallow Eval:   Consistencies Assessed:  Thin liquids 2 oz water by spoon, ice chips by spoon     Oral Phase:   Slow oral transit time    Pharyngeal Phase:   coughing/choking  delayed swallow initation  multiple spontaneous swallows  throat clearing      Goals:   Multidisciplinary Problems       SLP Goals           Problem: SLP    Goal Priority Disciplines Outcome   SLP Goal     SLP Progressing   Description: 1. Patient will maintain adequate hydration/nutrition with optimum safety and efficiency of swallowing function on P.O. intake without overt signs and symptoms of aspiration for the highest appropriate diet level.    2. Patient will develop functional skills and utilize compensatory strategies to communicate wants and needs effectively to different conversational partners, maintain safety and participate socially in functional living environment.                        Plan:     Patient to be seen:  5 x/week   Plan of Care expires:  06/23/25  Plan of Care reviewed with:  patient, spouse, sibling, other (see comments) (RN)   SLP Follow-Up:  Yes       Discharge recommendations:  Therapy Intensity Recommendations at Discharge: High Intensity Therapy   Barriers to Discharge:  Modified Barium Swallow Study    Time Tracking:     SLP Treatment Date:   06/09/25  Speech Start Time:  1000  Speech Stop Time:  1020     Speech Total Time (min):  20 min    Billable Minutes: Eval 10 min  and Eval Swallow and Oral Function 10 min    06/09/2025

## 2025-06-09 NOTE — PT/OT/SLP PROGRESS
"MODIFIED BARIUM SWALLOW STUDY  (MBSS)    Date of Evaluation: 06/09/2025     Name: Drew Kaur   MRN: [id number]    Diagnosis: Stroke    Recommendations:     Consistency Recommendations: Pureed (IDDSI 4) and moderately thick/Honey thick liquids (IDDSI 3)  Aspiration Precautions: use good oral hygiene , sit upright for all PO intake, increase physical mobility as tolerated, feed only when wake and alert, small bites and sips, NO straws, Slow pace, Respites as needed, allow extra time for meals, and 1:1 feeding assist by nursing staff only  Specialist Referrals: OT consult to improve independent feeding, PT consult to improve physical mobility  Therapy: Dysphagia therapy is recommended at this time.  Communication Strategies: yes/no questions only    Subjective       Past Medical History: Drew Kaur  has a past medical history of GERD (gastroesophageal reflux disease), Hypertension, and Seizures.  Drew Kaur  has a past surgical history that includes Eye surgery.    The patient is a 60 y.o. male who complains of coughing, choking, and difficulty swallowing liquids.     The following observations were made:   -Mental status: Alert and Cooperative  -Factors affecting study: no difficulties participating in the study  -Feeding Method: needs some assistance    Respiratory Status: Room air    Pneumonia History: No  Previous MBSS: No  Previous FEES: No    Pain Scale:  0/10 on VAS currently.     Objective     Modified Barium Swallow Study  Purpose: to evaluate anatomy and physiology of the oropharyngeal swallow, to determine effectiveness of rehabilitation strategies, and to determine diet consistency and intervention recommendations. The study was performed using the "Gold Standard" of 30 fps with as low as reasonably achievable (ALARA) exposure.     The patient was seen in radiology seated in High Pederson's position in a video imaging chair for lateral views of the larynx. NG tube observed in place. " The study was conducted using Varibar thin liquid (IDDSI 0), Varibar nectar liquid (IDDSI 2), Varibar honey liquid (IDDSI 3), Varibar pudding (IDDSI 4), and solid coated in Varibar pudding (IDDSI 7). He tolerated the procedure well.     Oral Musculature Evaluation:  Oral Musculature Evaluation  Oral Musculature: right weakness  Dentition: edentulous  Secretion Management: problems swallowing secretions  Mucosal Quality: adequate  Mandibular Strength and Mobility: impaired  Oral Labial Strength and Mobility: impaired retraction, impaired coordination  Lingual Strength and Mobility: impaired strength  Buccal Strength and Mobility: decreased tone (R sided)  Volitional Cough: Reduced  Volitional Swallow: Poor tolerane of secretions 2' to slight wet vocal quality  Voice Prior to PO Intake: Poor tolerancne of secretions 2' to slight wet vocal quality       CONSISTENCIES ADMINISTERED:    Consistency  Presentation  Findings Rosenbeck's Penetration/Aspiration Scale (PAS) Strategy Attempted    Thin (IDDSI 0) Single sips by spoon 7x    Views:  - Lateral view   Oral phase: decreased bolus control and premature spillage to the level of the vocal folds    Pharyngeal phase: deep penetration noted before, during, and after the swallow and aspiration noted after the swallow 2' to delayed initiation of swallow reflex    Esophageal sweep: trace delayed emptying/retention Best: (7) Material enters the airway, passes below the vocal folds, and is not ejected from the trachea despite effort    Worst: (7) Material enters the airway, passes below the vocal folds, and is not ejected from the trachea despite effort   Right head turn w/ chin tuck, Left head turn with chin tuck, right head turn, left head turn, chin tuck - strategies observed as ineffective in preventing penetration/aspiration   Nectar thick (mildly thick/IDDSI 2) Single sip by spoon 1x     Views:  - Lateral view   Oral phase: decreased bolus control and premature spillage to  the level of the vocal folds    Pharyngeal phase: deep penetration noted before, during, and after the swallow and aspiration noted during and after the swallow 2' to delayed initiation of swallow reflex    Esophageal sweep: WFL Best: (7) Material enters the airway, passes below the vocal folds, and is not ejected from the trachea despite effort    Worst: (7) Material enters the airway, passes below the vocal folds, and is not ejected from the trachea despite effort No strategies completed    Honey thick (moderately thick/ IDDSI 3) Single sip by spoon 2x     Views:  - Lateral view   Oral phase: WFL    Pharyngeal phase: max premature spillage into vallecular space 2' to delayed initiation of swallow     Esophageal sweep: delayed emptying/retention of bolus 2' to NG placement and possible esophageal bar Best: (1) Material does not enter the airway    Worst: (1) Material does not enter the airway No strategies completed or needed   Puree (extremely thick/ IDDSI 4) tsp x 1    Views:  - Lateral view   Oral phase: WFL    Pharyngeal phase: max premature spillage into vallecular space 2' to delayed initiation of swallow     Esophageal sweep: delayed emptying/retention of bolus 2' to NG placement Best: (1) Material does not enter the airway    Worst: (1) Material does not enter the airway No strategies completed or needed   Solid (regular/ IDDSI 7) - bite of roland cracker x1    View:  - Lateral view  Oral phase: excess chewing 2' to edentulous nature     Pharyngeal phase: max premature spillage into vallecular space 2' to delayed initiation of swallow    Esophageal sweep: significant delayed emptying/retention of bolus 2' to NG placement Best: (1) Material does not enter the airway    Worst: (1) Material does not enter the airway   No strategies completed or needed       Treatment   Total Treatment Time: 60 minutes    Patient educated regarding results and recommendations of MBSS, Plan of Care, role of SLP in care, and  anatomy and physiology of swallow mechanism as it relates to MBSS findings and recommendations. Patient expressed understanding. All questions were answered.     Assessment     Drew Kaur is a 60 y.o. male referred for Modified Barium Swallow Study with a medical diagnosis of Stroke. The patient presents with moderately severe pharyngeal dysphagia as determined by the Dysphagia Outcome and Severity Scale (ISHA). Level 2: Moderate-Severe Dysphagia.    Modified Barium Swallow Study (MBSS) revealed oral phase characterized by excess chewing and slow oral transit.    Pharyngeal phase characterized by immediate cough/throat clear , delayed pharyngeal swallow, and premature spillage to level of the vocal folds with thin liquids.    Esophageal screen was completed and no deficits were observed. Delayed emptying and reduced UES opening 2' to NG placement and possible esophageal bar.     Impressions: MBSS completed. Pt presents with moderate-severe dysphagia. Swallow efficiency is preserved; however, swallow safety is impaired..     Prognosis: Good    Barriers: None    Note: Conditions during a Modified barium Swallow Study are often considered optimal. This includes position of patient, timing and control of bolus, environment, and cueing for any necessary strategies    Goals:  Multidisciplinary Problems       SLP Goals          Problem: SLP    Goal Priority Disciplines Outcome   SLP Goal     SLP Progressing   Description: 1. Patient will maintain adequate hydration/nutrition with optimum safety and efficiency of swallowing function on P.O. intake without overt signs and symptoms of aspiration for the highest appropriate diet level.    2. Patient will develop functional skills and utilize compensatory strategies to communicate wants and needs effectively to different conversational partners, maintain safety and participate socially in functional living environment.                        Plan:   Patient to be seen:   Therapy Frequency: 5 x/week   Plan of Care expires:  06/23/25  Plan of Care reviewed with:  patient, spouse, other (see comments) (RN, NP)        Discharge recommendations:      Barriers to Discharge:  Level of Skilled Assistance Needed      Time Tracking:   SLP Treatment Date:   06/09/25  Speech Start Time:  1200  Speech Stop Time:  1300     Speech Total Time (min):  60 min    Billable Minutes: Motion Fluoro Swallow, Cine/Vid 60 min    06/09/2025  Therapist's Name:   Leigha Kate CCC-SLP   Speech Language Pathologist

## 2025-06-09 NOTE — ED NOTES
PT trying to cough but is unable to do so at this time. Nausea is resolved since arrival. Pt given a Yankauer suction to try and suction out his mouth or throat if need be. PT able to write on paper with pen to try to communicate.

## 2025-06-09 NOTE — PLAN OF CARE
[facility] - Med Surg  Initial Discharge Assessment       Primary Care Provider: Ishmael Izquierdo MD    Admission Diagnosis: CVA (cerebral vascular accident) [I63.9]  Acute focal neurological deficit [R29.818]  Stroke-like symptom [R29.90]  Encounter for imaging study to confirm nasogastric tube placement [Z01.89]  Nasogastric tube present [Z97.8]    Admission Date: 6/9/2025  Expected Discharge Date: 6/12/2025    Transition of Care Barriers: None    Patient alert & oriented able to speak softly. Sometimes having problems with getting the correct word out. His wife is at bedside & assisting a little with the assessment. Normally he is independent at home & denies using any medical equipment. Normally very active at home. He does use Dr Ascencio for neurology & Dr Izquierdo for PCP. He uses [facility] prescriptions or [facility] 1st preference if [facility]. He drinks daily 4-6 [place] Mist & coke. At discharge he is open to going to inpatient rehab.  had provided him with preferences. Denies any other needs at this time. Will continue to follow.        Payor: BLUE CROSS BLUE [facility] / Plan: BCBS ALL OUT OF STATE / Product Type: PPO /     Extended Emergency Contact Information  Primary Emergency Contact: rohini kirbyu  Mobile Phone: [phone]  Relation: Spouse  Secondary Emergency Contact: ARPITA APRIL  Mobile Phone: [phone]  Relation: Sister  Preferred language: English   needed? No    Discharge Plan A: Rehab  Discharge Plan B: Skilled Nursing Facility      [id number] [place] [facility] [address]  [address] [place] [address]  Phone: [phone] Fax: [phone]      Initial Assessment (most recent)       Adult Discharge Assessment - 06/09/25 1530          Discharge Assessment    Assessment Type Discharge Planning Assessment     Confirmed/corrected address, phone number and insurance Yes     Confirmed Demographics Correct on Facesheet     Source of  Information patient;family     Communicated YVES with patient/caregiver Yes     People in Home spouse     Name(s) of People in Home Luba Kaur spouse [phone]     Do you expect to return to your current living situation? Yes     Do you have help at home or someone to help you manage your care at home? Yes     Who are your caregiver(s) and their phone number(s)? Luba Kaur spouse [phone]     Prior to hospitilization cognitive status: Alert/Oriented     Current cognitive status: Alert/Oriented     Walking or Climbing Stairs Difficulty no   now has deficits on right side    Dressing/Bathing Difficulty no   now has deficits on right side    Home Accessibility stairs to enter home;stairs within home     Number of Stairs, Within Home, Primary one     Number of Stairs, Main Entrance three     Home Layout Able to live on 1st floor     Equipment Currently Used at Home none     Readmission within 30 days? No     Patient currently being followed by outpatient case management? No     Do you currently have service(s) that help you manage your care at home? No     Do you take prescription medications? Yes     Do you have prescription coverage? Yes     Do you have any problems affording any of your prescribed medications? No     Is the patient taking medications as prescribed? yes     Who is going to help you get home at discharge? Luba Kaur spouse [phone]     How do you get to doctors appointments? car, drives self;family or friend will provide     Are you on dialysis? No     Do you take coumadin? No     Discharge Plan A Rehab     Discharge Plan B Skilled Nursing Facility     DME Needed Upon Discharge  none     Discharge Plan discussed with: Patient;Spouse/sig other     Name(s) and Number(s) Luba williamson [phone]     Transition of Care Barriers None        Physical Activity    On average, how many days per week do you engage in moderate to strenuous exercise (like a brisk walk)? 7 days     On average, how  many minutes do you engage in exercise at this level? 150+ min        Financial Resource Strain    How hard is it for you to pay for the very basics like food, housing, medical care, and heating? Very hard        Housing Stability    In the last 12 months, was there a time when you were not able to pay the mortgage or rent on time? No     At any time in the past 12 months, were you homeless or living in a shelter (including now)? No        Transportation Needs    In the past 12 months, has lack of transportation kept you from medical appointments or from getting medications? No     In the past 12 months, has lack of transportation kept you from meetings, work, or from getting things needed for daily living? No        Food Insecurity    Within the past 12 months, you worried that your food would run out before you got the money to buy more. Never true     Within the past 12 months, the food you bought just didn't last and you didn't have money to get more. Never true        Stress    Do you feel stress - tense, restless, nervous, or anxious, or unable to sleep at night because your mind is troubled all the time - these days? Only a little        Social Isolation    How often do you feel lonely or isolated from those around you?  Never        Alcohol Use    Q1: How often do you have a drink containing alcohol? 4 or more times a week     Q2: How many drinks containing alcohol do you have on a typical day when you are drinking? 3 or 4     Q3: How often do you have six or more drinks on one occasion? Daily or almost daily        Utilities    In the past 12 months has the electric, gas, oil, or water company threatened to shut off services in your home? No        Health Literacy    How often do you need to have someone help you when you read instructions, pamphlets, or other written material from your doctor or pharmacy? Never

## 2025-06-09 NOTE — PLAN OF CARE
Right sided weakness all day, tolerated thicken diet. Aspiration and seizure precautions in place.   Problem: Stroke, Ischemic (Includes Transient Ischemic Attack)  Goal: Optimal Coping  Outcome: Progressing     Problem: Stroke, Ischemic (Includes Transient Ischemic Attack)  Goal: Optimal Cerebral Tissue Perfusion  Outcome: Progressing     Problem: Stroke, Ischemic (Includes Transient Ischemic Attack)  Goal: Improved Communication Skills  Outcome: Progressing     Problem: Stroke, Ischemic (Includes Transient Ischemic Attack)  Goal: Optimal Nutrition Intake  Outcome: Progressing

## 2025-06-09 NOTE — PLAN OF CARE
Spoke to Makayla with [facility] at [phone] & updated on care. States she feels his daughter & son in law will be able to come home for visit. No other needs at this time.

## 2025-06-09 NOTE — H&P
[facility]  History & Physical    Patient Name: Drew Kaur  MRN: [id number]  Patient Class: IP- Inpatient  Admission Date: 6/9/2025  Attending Physician: No att. providers found   Primary Care Provider: Ishmael Izquierdo MD         Patient information was obtained from patient, spouse/SO, relative(s), and ER records.     Subjective:     Principal Problem:Stroke    Chief Complaint:   Chief Complaint   Patient presents with    Cerebrovascular Accident     Right sided deficit present upon wake up at 0200        HPI: Drew Kaur is a 60-year-old male with PMHx significant for HTN, seizures and GERD.  He presented to the ER with right-sided deficits and expressive aphasia.  Patient went to bed around 9:00 p.m. which was his last known well time.  When he awoke around 2:00 a.m. he was unable to move his right side.  He woke up his wife and 911 was called.  Code stroke was activated in the ED. CT of the head was obtained noting chronic changes but no evidence for acute intracranial process.  CT of the head neck revealed focal nonocclusive intraluminal thrombus of the basal artery, chronic versus acute occlusion of the V4 segment of right vertebral artery.  Recommendations were made for loading dose of Plavix 300 mg and loading dose of aspirin 325 mg.  He was noted to fail his Singh swallow eval and NG tube was placed.  Patient will be admitted to the services of Hospital Medicine for further evaluation and treatment.    Past Medical History:   Diagnosis Date    GERD (gastroesophageal reflux disease)     Hypertension     Seizures        Past Surgical History:   Procedure Laterality Date    EYE SURGERY         Review of patient's allergies indicates:  No Known Allergies    No current facility-administered medications on file prior to encounter.     Current Outpatient Medications on File Prior to Encounter   Medication Sig    amLODIPine (NORVASC) 2.5 MG tablet Take 5 mg by mouth  once daily.    aspirin (ECOTRIN) 81 MG EC tablet Take 81 mg by mouth once daily.    levETIRAcetam (KEPPRA) 500 MG Tab Take 500 mg by mouth 2 (two) times daily. Take one tablet in am and 2 tablets at night    pantoprazole (PROTONIX) 40 MG tablet     potassium chloride (MICRO-K) 10 MEQ CpSR Take 10 mEq by mouth once daily.    [DISCONTINUED] potassium chloride (KLOR-CON) 10 MEQ TbSR Take 10 mEq by mouth once daily.    chlordiazepoxide (LIBRIUM) 25 MG Cap Take 1 capsule (25 mg total) by mouth 2 (two) times a day.    [DISCONTINUED] amLODIPine (NORVASC) 2.5 MG tablet TAKE 1 TABLET BY MOUTH TWICE A DAY    [DISCONTINUED] metoprolol succinate (TOPROL-XL) 25 MG 24 hr tablet TAKE 1 TABLET BY MOUTH EVERY DAY     Family History       Problem Relation (Age of Onset)    Arthritis Brother    Diabetes Mother    Heart attack Father    Hypertension Mother, Brother    No Known Problems Sister, Sister, Sister, Brother    Stroke Father          Tobacco Use    Smoking status: Never    Smokeless tobacco: Never   Substance and Sexual Activity    Alcohol use: Yes     Comment: Socially    Drug use: No    Sexual activity: Not Currently     Review of Systems   Constitutional:  Positive for activity change.   Neurological:  Positive for facial asymmetry, speech difficulty and weakness.   All other systems reviewed and are negative.    Objective:     Vital Signs (Most Recent):  Temp: 98.2 °F (36.8 °C) (06/09/25 1100)  Pulse: 74 (06/09/25 1100)  Resp: 18 (06/09/25 1100)  BP: 118/80 (06/09/25 1100)  SpO2: 95 % (06/09/25 1100) Vital Signs (24h Range):  Temp:  [98.2 °F (36.8 °C)] 98.2 °F (36.8 °C)  Pulse:  [63-94] 74  Resp:  [13-34] 18  SpO2:  [93 %-96 %] 95 %  BP: (108-154)/() 118/80     Weight: 69.5 kg (153 lb 3.5 oz)  Body mass index is 21.98 kg/m².     Physical Exam  Constitutional:       General: He is not in acute distress.     Appearance: He is ill-appearing. He is not toxic-appearing.   HENT:      Head: Normocephalic and atraumatic.       Mouth/Throat:      Mouth: Mucous membranes are moist.      Pharynx: Oropharynx is clear.   Eyes:      General: No scleral icterus.     Extraocular Movements: Extraocular movements intact.      Conjunctiva/sclera: Conjunctivae normal.      Pupils: Pupils are equal, round, and reactive to light.   Cardiovascular:      Rate and Rhythm: Normal rate and regular rhythm.      Pulses: Normal pulses.      Heart sounds: Normal heart sounds.   Pulmonary:      Effort: Pulmonary effort is normal. No respiratory distress.      Breath sounds: Normal breath sounds.   Abdominal:      General: There is no distension.      Palpations: Abdomen is soft.   Musculoskeletal:      Cervical back: Normal range of motion and neck supple.      Right lower leg: No edema.      Left lower leg: No edema.      Comments:      Skin:     General: Skin is warm and dry.      Capillary Refill: Capillary refill takes less than 2 seconds.      Coloration: Skin is not jaundiced.   Neurological:      Mental Status: He is alert and oriented to person, place, and time.      Cranial Nerves: Facial asymmetry (right facial droop) present.      Motor: Weakness present.      Comments: Right upper and lower extremity flaccid.  Right  0/5, RLE pedal push 0/5.     Psychiatric:         Behavior: Behavior normal.              CRANIAL NERVES     CN III, IV, VI   Pupils are equal, round, and reactive to light.       Significant Labs: All pertinent labs within the past 24 hours have been reviewed.  CBC:   Recent Labs   Lab 06/09/25  0423   WBC 5.30   HGB 14.6   HCT 41.7        CMP:   Recent Labs   Lab 06/09/25  0423      K 3.8      CO2 20*      BUN 13   CREATININE 0.9   CALCIUM 8.8   PROT 6.9   ALBUMIN 3.5   BILITOT 0.6   ALKPHOS 116   AST 26   ALT 20   ANIONGAP 15     Lipid Panel:   Recent Labs   Lab 06/09/25  0423   CHOL 163   HDL 90*   LDLCALC 40.6*   TRIG 162*   CHOLHDL 55.2*         Significant Imaging: I have reviewed all pertinent  imaging results/findings within the past 24 hours.    XR NG/OG tube placement check, non-radiologist performed [[id number]] Resulted: 06/09/25 0848   Order Status: Completed Updated: 06/09/25 0850   Narrative:     EXAMINATION:  XR NG/OG TUBE PLACEMENT CHECK, NON-RADIOLOGIST PERFORMED    CLINICAL HISTORY:  ng tube;Encounter for other specified special examinations    TECHNIQUE:  Portable view of the chest and upper abdomen.    COMPARISON:  Same day.    FINDINGS:  Nasogastric tube repositioned with the distal tube coiled in the gastric fundus.      Electronically signed by: Tommy De La O  Date: 06/09/2025  Time: 08:48   XR NG/OG tube placement check, non-radiologist performed [[id number]] Resulted: 06/09/25 0847   Order Status: Completed Updated: 06/09/25 0850   Narrative:     EXAMINATION:  XR NG/OG TUBE PLACEMENT CHECK, NON-RADIOLOGIST PERFORMED    CLINICAL HISTORY:  og tube placement;Presence of other specified devices    TECHNIQUE:  Portable view of the chest and upper abdomen.    COMPARISON:  None    FINDINGS:  Nasogastric tube is coiled in the neck and needs to be repositioned.      Electronically signed by: Tommy De La O  Date: 06/09/2025  Time: 08:47   CTA HEAD AND NECK FOR CODE STROKE (XPD) [[id number]] (Abnormal) Resulted: 06/09/25 0516   Order Status: Completed Updated: 06/09/25 0519   Narrative:     EXAMINATION:  CTA head and neck June 9, 2025    CLINICAL HISTORY:  Neuro deficit, acute stroke suspected    TECHNIQUE:  CT a examination of the head and neck was performed after the administration of 100 mL Omnipaque 350 intravenous contrast.  Axial imaging obtained from above the level the vertex to the level of the superior aspect of the thoracic aortic arch.  Axial MIP reconstruction imaging is submitted.  Sagittal reconstruction imaging is submitted, coronal reconstruction imaging was performed by the radiologist on the radiology workstation at the time of dictation.    COMPARISON:  CT examination of the  brain without contrast June 9, 2025    FINDINGS:  There is artifact including mild motion artifact present, this diminishes the sensitivity of the examination.    The visualized superior aspect of the thoracic aortic arch demonstrates appropriate opacification.    The patient appears left vertebral dominant.  There is tortuosity of the proximal segment of the left vertebral artery.  The left vertebral artery demonstrates appropriate opacification throughout its course, atherosclerotic change noted, there is no evidence for high-grade stenosis or occlusion or dissection.    The right vertebral artery appears small in caliber throughout its course, given the left vertebral dominance.  The distal segment of the right vertebral artery, the V4 segment is not well delineated.  Not opacified.  On close evaluation there appears to be a thin structure that would be consistent with a thin small the 4 segment of the distal right vertebral artery, without appreciable opacification and therefore consistent with occlusion, given the small size of the vessel this may be chronic, however acute versus chronic occlusion would be in the differential, clinical correlation is needed.    The basilar artery appears tortuous.  There is a small focal filling defect within the basilar artery at the level of the mid to upper basilar artery, as best seen on axial series 3, image 200 consistent with a small nonocclusive intraluminal thrombus.    The common, external and internal carotid arteries bilaterally demonstrate appropriate opacification.  The internal carotid arteries demonstrate opacification to the level of the skull base and [id number].  Mild atherosclerotic change noted, there is no evidence for high-grade stenosis or occlusion or dissection.    The anterior cerebral artery and middle cerebral artery bilaterally demonstrate appropriate opacification.  There is a posterior communicating artery on the left.  The posterior  cerebral artery bilaterally demonstrates appropriate opacification.  There is no evidence for high-grade stenosis or occlusion, no evidence for intracranial aneurysm or AVM.  The intracranial venous sinuses appear appropriate.    The intracranial appearance is stable when compared to the noncontrast head CT of the same evening.  The visualized orbits appear intact.  Paranasal sinus findings are noted on the head CT.  The mastoid air cells appear well aerated.    There is no evidence for dominant neck mass or cystic collection.  The airway appears patent.  There is no abnormal thickening of the epiglottis.  The osseous structures appear intact.  The lung apices demonstrate appearance of may relate to components of diminished depth of inspiration and atelectasis and chronic change.   Impression:       Focal nonocclusive intraluminal thrombus of the basilar artery, as discussed above.    The patient is left vertebral dominant, the right vertebral artery is diminutive along its course, the V4 segment of the distal right vertebral artery demonstrates lack of opacification, consistent with occlusion, this may relate to chronic occlusion however acute versus chronic occlusion would be in the differential.    The left vertebral artery demonstrates no evidence for high-grade stenosis or occlusion.    The carotid arterial vasculature bilaterally demonstrates no evidence for high-grade stenosis or occlusion.    The remainder of the major intracranial arterial vascular structures demonstrate no additional evidence for high-grade stenosis or occlusion, and there is no evidence for intracranial aneurysm or AVM.    Additional findings as above.    This report was flagged in Epic as abnormal.    The findings were discussed with Dr. Burnette in the ER prior to dictation.      Electronically signed by: Immanuel Werner  Date: 06/09/2025  Time: 05:16   CT HEAD FOR CODE STROKE [[id number]] Resulted: 06/09/25 0448   Order Status: Completed  Updated: 06/09/25 0450   Narrative:     EXAMINATION:  CT HEAD FOR CODE STROKE    CLINICAL HISTORY:  Neuro deficit, acute, stroke suspected;    TECHNIQUE:  Low dose axial images were obtained through the head.  Coronal and sagittal reformations were also performed. Contrast was not administered.    COMPARISON:  CT examination of the brain August 19, 2024    FINDINGS:  There is artifact present, this diminishes the sensitivity of the examination.  When accounting for artifact the appearance of the ventricular system, sulcal pattern and parenchymal attenuation character is consistent with chronic change.  Involutional change and chronic appearing white matter change noted.    There is no evidence for intracranial mass, mass effect or midline shift and there is no evidence for acute intracranial hemorrhage.  Appropriate CSF spaces are seen at the skull base.    The visualized mastoid air cells appear well aerated.  There is a suspected small mucous retention cyst of the right maxillary antrum.  Mild mucosal thickening is noted mild opacity at the mid right ethmoid level.  The visualized orbits appear intact.  The osseous structures demonstrate chronic change.   Impression:       Chronic changes are noted, there is no evidence for acute intracranial process.    Paranasal sinus findings as above.      Electronically signed by: Immanuel Werner  Date: 06/09/2025  Time: 04:48        Assessment/Plan:     Assessment & Plan  Stroke  Acute problem  Antithrombotics for secondary stroke prevention: Antiplatelets: Aspirin: 81 mg daily  Aspirin: 325 mg daily  Clopidogrel: 300 mg loading dose x 1, now  Clopidogrel: 75 mg daily    Statins for secondary stroke prevention and hyperlipidemia, if present:   Statins: Atorvastatin- 80 mg daily    Aggressive risk factor modification: HTN     Rehab efforts: The patient has been evaluated by a stroke team provider and the therapy needs have been fully considered based off the presenting  complaints and exam findings. The following therapy evaluations are needed: PT evaluate and treat, OT evaluate and treat, SLP evaluate and treat    Diagnostics ordered/pending: CT scan of head without contrast to asses brain parenchyma, CTA Head to assess vasculature , CTA Neck/Arch to assess vasculature, HgbA1C to assess blood glucose levels, Lipid Profile to assess cholesterol levels, MRI head without contrast to assess brain parenchyma, TTE to assess cardiac function/status , TSH to assess thyroid function    VTE prophylaxis: Heparin 5000 units SQ every 8 hours    BP parameters: Infarct: No intervention, SBP <220    Consult TeleVasc Neurology  Hypertension  Patient's blood pressure range in the last 24 hours was: BP  Min: 108/77  Max: 154/109.The patient's inpatient anti-hypertensive regimen is listed below:  Current Antihypertensives  , Daily, Oral  labetalol 20 mg/4 mL (5 mg/mL) IV syring, Every 15 min PRN, Intravenous    Plan  - BP is controlled, no changes needed to their regimen  - Holding BP meds in setting of acute neurological deficit, see plan for CVA  Seizure disorder  Continue Keppra      VTE Risk Mitigation (From admission, onward)           Ordered     heparin (porcine) injection 5,000 Units  Every 8 hours         06/09/25 0731     IP VTE HIGH RISK PATIENT  Once         06/09/25 0731     Place sequential compression device  Until discontinued         06/09/25 0731                                    Lashon Dela Cruz NP  Department of Hospital Medicine  [facility]

## 2025-06-09 NOTE — PLAN OF CARE
Problem: Occupational Therapy  Goal: Occupational Therapy Goal  Description: Goals to be met by: 6/23/25    Patient will demonstrate increased functional independence with ADLs by performing:    Sitting EOB with SBA x 5 minutes.     Complete stand pivot transfer to chair with mod A x 1 person.    Complete upper body dressing with mod A and compensatory strategies as needed.     Outcome: Ongoing

## 2025-06-09 NOTE — PLAN OF CARE
Problem: Physical Therapy  Goal: Physical Therapy Goal  Description: Goals    Patient to increase right lower extremity strength by 1/2 muscle grade.  Patient to roll left and right with min assistance.  Patient to transfer supine <> sit with min assistance.  Patient to transfer bed <> chair via stand-pivot with mod assistance.  Patient to ambulate with most appropriate AD  and mod assistance > 15 feet.    Outcome: Ongoing

## 2025-06-09 NOTE — PLAN OF CARE
Problem: SLP  Goal: SLP Goal  Description: 1. Patient will maintain adequate hydration/nutrition with optimum safety and efficiency of swallowing function on P.O. intake without overt signs and symptoms of aspiration for the highest appropriate diet level.    2. Patient will develop functional skills and utilize compensatory strategies to communicate wants and needs effectively to different conversational partners, maintain safety and participate socially in functional living environment.   Outcome: Progressing

## 2025-06-09 NOTE — HPI
Drew Kaur is a 60-year-old male with PMHx significant for HTN, seizures and GERD.  He presented to the ER with right-sided deficits and expressive aphasia.  Patient went to bed around 9:00 p.m. which was his last known well time.  When he awoke around 2:00 a.m. he was unable to move his right side.  He woke up his wife and 911 was called.  Code stroke was activated in the ED. CT of the head was obtained noting chronic changes but no evidence for acute intracranial process.  CT of the head neck revealed focal nonocclusive intraluminal thrombus of the basal artery, chronic versus acute occlusion of the V4 segment of right vertebral artery.  Recommendations were made for loading dose of Plavix 300 mg and loading dose of aspirin 325 mg.  He was noted to fail his Singh swallow eval and NG tube was placed.  Patient will be admitted to the services of Hospital Medicine for further evaluation and treatment.

## 2025-06-09 NOTE — PLAN OF CARE
Met with patient and his wife to review discharge recommendation of IPR and they are agreeable to plan.    Patient/family provided with a list of agencies / facilities in-network with patient's payor plan. Providers that are owned, operated, or affiliated with [facility] are included on the list.     Notified that referrals will be sent to the below listed agencies / facilities from in-network list based on proximity to home / family support:   1.[facility]  2.[facility]      Patient / family instructed to identify preference.    Preferred Agency / Facility: (if more than 1, listed in order of descending preference)  1.[facility]    If an additional preferred agency / facility not listed above is identified, additional referral to be sent. If above agencies / facilities unable to accept, will send additional referrals to in-network providers.

## 2025-06-09 NOTE — SUBJECTIVE & OBJECTIVE
HPI:  60 y.o. male with past medical history of GERD, seizures and hypertension was brought to the ER with right-sided deficits and expressive aphasia.  Patient is new to history was obtained from wife.  Last known well was 9:00 p.m. and patient went to bed.  Later, around 2:00 a.m., he woke his wife and reported he is unable to move his right side.  Wife called 911.  Per ER provider, patient actively developed right facial droop while en-route and he was speaking on arrival to the ER and later progressed to having complete expressive aphasia.       Images personally reviewed and interpreted:  Study: Head CT and CTA Head & Neck  Study Interpretation:  No acute intracranial abnormalities on head CT.  No intracranial large/medial vessel occlusion that is intervenable. Extracranial ICA and carotid bifurcations look normal     Assessment and plan:  60-year-old man with sudden onset right-sided deficit, right facial droop and expressive aphasia concerning for left subcortical MCA territory infarct, likely left basal ganglia.    Lytics recommendation: Thrombolytic therapy not recommended due to Outside of treatment window .  [facility] facility does not have stat MR brain capability to run wake-up protocol    Thrombectomy recommendation: No; Large core infarct on imaging   Placement recommendation: admit to inpatient     -MRI brain w/o contrast  -Transthoracic ECHO (TTE) with Bubble. If + for PFO, perform b/l LE USG to r/o DVT. If neg for DVT, Holter monitor at d/c if TTE is negative. If holter is neg/inconclusive, consider ILR (implantable loop recorder)  -Stroke Labs: Lipid profile, HbA1C and TSH   -load with ASA 325mg and Plavix 300mg NOW  -Daily aspirin 81 mg /  clopidogrel 75 mg x 21 days followed by ASA 81mg OD monotherapy therafter. Aspirin and Plavix response study. If not therapeutically adequate, switch to Brilinta 90mg BID  -High intensity statin (Atorvastatin 80mg OD/Rosuvastatin 20mg OD)   -Permissive HTN x 24-48  hrs post index event (Goal SBP<220)  -PT/OT/Speech recs for discharge planning    -consult tele vascular neurology for follow up tomorrow/after workup is complete

## 2025-06-09 NOTE — TELEMEDICINE CONSULT
[facility]  Vascular Neurology  Comprehensive Stroke Center  TeleVascular Neurology Acute Consultation Note        Consult Information  Consult to Telemedicine - Acute Stroke  Consult performed by: Mikey Elizabeth MD  Consult ordered by: Adelina Burk MD          Consulting Provider: ADELINA BURK   Current Providers  No providers found    Patient Location:  [facility] EMERGENCY DEPARTMENT Emergency Department    [facility] hospital nurse at bedside with patient assisting consultant.  Patient information was obtained from patient's wife and ER provider.       Stroke Documentation  Acute Stroke Times   Last Known Normal Date: 06/09/25  Last Known Normal Time: 2100  Stroke Team Called Date: 06/09/25  Stroke Team Called Time: 0435  Stroke Team Arrival Date: 06/09/25  Stroke Team Arrival Time: 0435  CT Interpretation Time: 0437  Thrombolytic Therapy Recommended: No  CTA Interpretation Time: 0440  Thrombectomy Recommended: No    NIH Scale:  1a. Level of Consciousness: 0-->Alert, keenly responsive  1b. LOC Questions: 2-->Answers neither question correctly  1c. LOC Commands: 0-->Performs both tasks correctly  2. Best Gaze: 0-->Normal  3. Visual: 0-->No visual loss  4. Facial Palsy: 2-->Partial paralysis (total or near-total paralysis of lower face)  5a. Motor Arm, Left: 0-->No drift, limb holds 90 (or 45) degrees for full 10 secs  5b. Motor Arm, Right: 4-->No movement  6a. Motor Leg, Left: 0-->No drift, leg holds 30 degree position for full 5 secs  6b. Motor Leg, Right: 4-->No movement  7. Limb Ataxia: 0-->Absent  8. Sensory: 0-->Normal, no sensory loss  9. Best Language: 1-->Mild-to-moderate aphasia, some obvious loss of fluency or facility of comprehension, without significant limitation on ideas expressed or form of expression. Reduction of speech and/or comprehension, however, makes conversation. . . (see row details)  10. Dysarthria: 0-->Normal  11. Extinction and Inattention (formerly  Neglect): 0-->No abnormality  Total (NIH Stroke Scale): 13      Modified [place] Baseline: Score: 0  Modified [place] Discharge:    Nany Coma Scale:     ABCD2 Score:    [id number] Score:    HAS -BLED Score:    ICH Score:    Hunt & Matson Classification:      Blood pressure 133/84, pulse 67, resp. rate (!) 28, weight 69.5 kg (153 lb 3.5 oz), SpO2 96%.      In my opinion, this was a: Tier 1; VAN Stroke Assessment: Positive     Medical Decision Making  HPI:  60 y.o. male with past medical history of GERD, seizures and hypertension was brought to the ER with right-sided deficits and expressive aphasia.  Patient is new to history was obtained from wife.  Last known well was 9:00 p.m. and patient went to bed.  Later, around 2:00 a.m., he woke his wife and reported he is unable to move his right side.  Wife called 911.  Per ER provider, patient actively developed right facial droop while en-route and he was speaking on arrival to the ER and later progressed to having complete expressive aphasia.       Images personally reviewed and interpreted:  Study: Head CT and CTA Head & Neck  Study Interpretation:  No acute intracranial abnormalities on head CT.  No intracranial large/medial vessel occlusion that is intervenable. Extracranial ICA and carotid bifurcations look normal.  There is small filling defect in the mid basilar artery, maybe subocclusive thrombus versus artifact.     Assessment and plan:  60-year-old man with sudden onset right-sided deficit, right facial droop and expressive aphasia concerning for left subcortical MCA territory infarct, likely left basal ganglia.    Lytics recommendation: Thrombolytic therapy not recommended due to Outside of treatment window .  [facility] facility does not have stat MR brain capability to run wake-up protocol    Thrombectomy recommendation: No; Large core infarct on imaging   Placement recommendation: admit to inpatient     -MRI brain w/o contrast  -Transthoracic ECHO (TTE) with  Bubble. If + for PFO, perform b/l LE USG to r/o DVT. If neg for DVT, Holter monitor at d/c if TTE is negative. If holter is neg/inconclusive, consider ILR (implantable loop recorder)  -Stroke Labs: Lipid profile, HbA1C and TSH   -load with ASA 325mg and Plavix 300mg NOW  -Daily aspirin 81 mg /  clopidogrel 75 mg x 21 days followed by ASA 81mg OD monotherapy therafter. Aspirin and Plavix response study. If not therapeutically adequate, switch to Brilinta 90mg BID  -High intensity statin (Atorvastatin 80mg OD/Rosuvastatin 20mg OD)   -Permissive HTN x 24-48 hrs post index event (Goal SBP<220)  -PT/OT/Speech recs for discharge planning    -consult tele vascular neurology for follow up tomorrow/after workup is complete                        ROS  Physical Exam  Neurological:      Mental Status: He is alert.      Cranial Nerves: Facial asymmetry (Right lower facial asymmetry) present.      Sensory: Sensory deficit (Dense sensory loss in right face +arm +leg) present.      Motor: Weakness (RUE and RLE 0/5, LUE and LLE 5/5) present.      Comments: Severe expressive aphasia.  Patient is able to follow commands and comprehend with nods.  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  Immediate/timely evaluation by a highly prepared expert is paramount for optimal outcomes  High risk for neurological deterioration if not properly diagnosed  High risk for neurological deterioration if not treated promplty/as soon as possible  Complex diagnostic evaluation may be required (advanced imaging)  High risk treatment options (thrombolytics and/or thrombectomy)    Patient care was coordinated with spoke provider, including but not limted to    Discussing likely diagnosis/etiology of symptoms  Making recommendations for further diagnostic studies  Making recommendations for intravenous thrombolytics or other advanced therapies  Making recommendations for disposition (admission/transfer for higher level of care)      Neurology consultation requested by spoke provider. Audiovisual encounter with the patient performed using a secure connection.  Results and impressions from the visit are documented on this note and were communicated to the consulting provider/team via direct communication. The note has been shared for addition to the patients electronic medical record.

## 2025-06-09 NOTE — ED PROVIDER NOTES
Encounter Date: 6/9/2025       History   No chief complaint on file.    60-year-old male with a history of GERD, hypertension presents to ED for evaluation of stroke-like symptoms, no prior history of stroke. Complete weakness/no movement of right upper and lower extremities, facial asymmetry; right lower facial droop, whispered speech, with onset when he woke up, approximately 2 hours prior to arrival. Last known well 9:00 p.m. at which time he went to bed without any of the aforementioned symptoms.    The history is provided by the patient. No  was used.     Review of patient's allergies indicates:  No Known Allergies  Past Medical History:   Diagnosis Date    GERD (gastroesophageal reflux disease)     Hypertension      Past Surgical History:   Procedure Laterality Date    EYE SURGERY       Family History   Problem Relation Name Age of Onset    Diabetes Mother      Hypertension Mother      Stroke Father      Heart attack Father      No Known Problems Sister      Hypertension Brother      No Known Problems Sister      No Known Problems Sister      Arthritis Brother      No Known Problems Brother       Social History[1]  Review of Systems   Constitutional: Negative.    HENT: Negative.     Eyes: Negative.    Respiratory: Negative.     Cardiovascular: Negative.    Gastrointestinal: Negative.    Genitourinary: Negative.    Musculoskeletal: Negative.    Neurological:  Positive for facial asymmetry, speech difficulty and weakness.   Psychiatric/Behavioral: Negative.     All other systems reviewed and are negative.      Physical Exam     Initial Vitals [06/09/25 0431]   BP Pulse Resp Temp SpO2   133/84 68 13 -- 96 %      MAP       --         Physical Exam    Nursing note and vitals reviewed.  Constitutional: He appears well-developed.   HENT:   Head: Normocephalic.   Eyes: Pupils are equal, round, and reactive to light.   Neck:   Normal range of motion.  Cardiovascular:  Normal rate.            Pulmonary/Chest: Breath sounds normal.   Abdominal: Abdomen is soft. Bowel sounds are normal.   Musculoskeletal:      Cervical back: Normal range of motion.     Neurological: He is alert.   No movement of right upper and lower extremities, facial asymmetry; right lower facial droop   Sensation is intact equal bilateral  NIH 13 points           ED Course   Critical Care    Date/Time: 6/9/2025 5:25 AM    Performed by: Jarrod Burnette MD  Authorized by: Jarrod Burnette MD  Direct patient critical care time: 10 minutes  Additional history critical care time: 5 minutes  Ordering / reviewing critical care time: 5 minutes  Documentation critical care time: 5 minutes  Consulting other physicians critical care time: 5 minutes  Other critical care time: 10 minutes  Total critical care time (exclusive of procedural time) : 40 minutes  Critical care time was exclusive of separately billable procedures and treating other patients.  Critical care was necessary to treat or prevent imminent or life-threatening deterioration of the following conditions: CNS failure or compromise.  Critical care was time spent personally by me on the following activities: development of treatment plan with patient or surrogate, blood draw for specimens, discussions with consultants, interpretation of cardiac output measurements, evaluation of patient's response to treatment, examination of patient, obtaining history from patient or surrogate, ordering and performing treatments and interventions, ordering and review of laboratory studies, ordering and review of radiographic studies, pulse oximetry, re-evaluation of patient's condition and review of old charts.        Labs Reviewed   COMPREHENSIVE METABOLIC PANEL - Abnormal       Result Value    Sodium 140      Potassium 3.8      Chloride 105      CO2 20 (*)     Glucose 109      BUN 13      Creatinine 0.9      Calcium 8.8      Protein Total 6.9      Albumin 3.5      Bilirubin Total 0.6      ALP  116      AST 26      ALT 20      Anion Gap 15      eGFR >60     LIPID PANEL - Abnormal    Cholesterol Total 163      Triglyceride 162 (*)     HDL Cholesterol 90 (*)     LDL Cholesterol 40.6 (*)     HDL/Cholesterol Ratio 55.2 (*)     Cholesterol/HDL Ratio 1.8 (*)     Non HDL Cholesterol 73     CBC WITH DIFFERENTIAL - Abnormal    WBC 5.30      RBC 4.59 (*)     HGB 14.6      HCT 41.7      MCV 91      MCH 31.8 (*)     MCHC 35.0      RDW 12.4      Platelet Count 219      MPV 9.8      Nucleated RBC 0      Neut % 72.6      Lymph % 15.3 (*)     Mono % 7.0      Eos % 3.2      Basophil % 1.5      Imm Grans % 0.4      Neut # 3.85      Lymph # 0.81 (*)     Mono # 0.37      Eos # 0.17      Baso # 0.08      Imm Grans # 0.02     PROTIME-INR - Normal    PT 11.3      INR 1.0     TSH - Normal    TSH 2.805     CBC W/ AUTO DIFFERENTIAL    Narrative:     The following orders were created for panel order CBC W/ AUTO DIFFERENTIAL.  Procedure                               Abnormality         Status                     ---------                               -----------         ------                     CBC with Differential[[id number]]       Abnormal            Final result                 Please view results for these tests on the individual orders.   POCT GLUCOSE    POCT Glucose 103     POCT GLUCOSE MONITORING CONTINUOUS          Imaging Results               CTA HEAD AND NECK FOR CODE STROKE (XPD) (Final result)  Result time 06/09/25 05:16:57      Final result by Immanuel Werner MD (06/09/25 05:16:57)                   Impression:      Focal nonocclusive intraluminal thrombus of the basilar artery, as discussed above.    The patient is left vertebral dominant, the right vertebral artery is diminutive along its course, the V4 segment of the distal right vertebral artery demonstrates lack of opacification, consistent with occlusion, this may relate to chronic occlusion however acute versus chronic occlusion would be in the  differential.    The left vertebral artery demonstrates no evidence for high-grade stenosis or occlusion.    The carotid arterial vasculature bilaterally demonstrates no evidence for high-grade stenosis or occlusion.    The remainder of the major intracranial arterial vascular structures demonstrate no additional evidence for high-grade stenosis or occlusion, and there is no evidence for intracranial aneurysm or AVM.    Additional findings as above.    This report was flagged in Epic as abnormal.    The findings were discussed with Dr. Burnette in the ER prior to dictation.      Electronically signed by: Immanuel Werner  Date:    06/09/2025  Time:    05:16               Narrative:    EXAMINATION:  CTA head and neck June 9, 2025    CLINICAL HISTORY:  Neuro deficit, acute stroke suspected    TECHNIQUE:  CT a examination of the head and neck was performed after the administration of 100 mL Omnipaque 350 intravenous contrast.  Axial imaging obtained from above the level the vertex to the level of the superior aspect of the thoracic aortic arch.  Axial MIP reconstruction imaging is submitted.  Sagittal reconstruction imaging is submitted, coronal reconstruction imaging was performed by the radiologist on the radiology workstation at the time of dictation.    COMPARISON:  CT examination of the brain without contrast June 9, 2025    FINDINGS:  There is artifact including mild motion artifact present, this diminishes the sensitivity of the examination.    The visualized superior aspect of the thoracic aortic arch demonstrates appropriate opacification.    The patient appears left vertebral dominant.  There is tortuosity of the proximal segment of the left vertebral artery.  The left vertebral artery demonstrates appropriate opacification throughout its course, atherosclerotic change noted, there is no evidence for high-grade stenosis or occlusion or dissection.    The right vertebral artery appears small in caliber throughout  its course, given the left vertebral dominance.  The distal segment of the right vertebral artery, the V4 segment is not well delineated.  Not opacified.  On close evaluation there appears to be a thin structure that would be consistent with a thin small the 4 segment of the distal right vertebral artery, without appreciable opacification and therefore consistent with occlusion, given the small size of the vessel this may be chronic, however acute versus chronic occlusion would be in the differential, clinical correlation is needed.    The basilar artery appears tortuous.  There is a small focal filling defect within the basilar artery at the level of the mid to upper basilar artery, as best seen on axial series 3, image 200 consistent with a small nonocclusive intraluminal thrombus.    The common, external and internal carotid arteries bilaterally demonstrate appropriate opacification.  The internal carotid arteries demonstrate opacification to the level of the skull base and [id number].  Mild atherosclerotic change noted, there is no evidence for high-grade stenosis or occlusion or dissection.    The anterior cerebral artery and middle cerebral artery bilaterally demonstrate appropriate opacification.  There is a posterior communicating artery on the left.  The posterior cerebral artery bilaterally demonstrates appropriate opacification.  There is no evidence for high-grade stenosis or occlusion, no evidence for intracranial aneurysm or AVM.  The intracranial venous sinuses appear appropriate.    The intracranial appearance is stable when compared to the noncontrast head CT of the same evening.  The visualized orbits appear intact.  Paranasal sinus findings are noted on the head CT.  The mastoid air cells appear well aerated.    There is no evidence for dominant neck mass or cystic collection.  The airway appears patent.  There is no abnormal thickening of the epiglottis.  The osseous structures appear  intact.  The lung apices demonstrate appearance of may relate to components of diminished depth of inspiration and atelectasis and chronic change.                                       CT HEAD FOR CODE STROKE (Final result)  Result time 06/09/25 04:48:11      Final result by Immanuel Werner MD (06/09/25 04:48:11)                   Impression:      Chronic changes are noted, there is no evidence for acute intracranial process.    Paranasal sinus findings as above.      Electronically signed by: Immanuel Werner  Date:    06/09/2025  Time:    04:48               Narrative:    EXAMINATION:  CT HEAD FOR CODE STROKE    CLINICAL HISTORY:  Neuro deficit, acute, stroke suspected;    TECHNIQUE:  Low dose axial images were obtained through the head.  Coronal and sagittal reformations were also performed. Contrast was not administered.    COMPARISON:  CT examination of the brain August 19, 2024    FINDINGS:  There is artifact present, this diminishes the sensitivity of the examination.  When accounting for artifact the appearance of the ventricular system, sulcal pattern and parenchymal attenuation character is consistent with chronic change.  Involutional change and chronic appearing white matter change noted.    There is no evidence for intracranial mass, mass effect or midline shift and there is no evidence for acute intracranial hemorrhage.  Appropriate CSF spaces are seen at the skull base.    The visualized mastoid air cells appear well aerated.  There is a suspected small mucous retention cyst of the right maxillary antrum.  Mild mucosal thickening is noted mild opacity at the mid right ethmoid level.  The visualized orbits appear intact.  The osseous structures demonstrate chronic change.                                       Medications   iohexoL (OMNIPAQUE 350) injection 100 mL (100 mLs Intravenous Given 6/9/25 7348)   aspirin suppository 300 mg (300 mg Rectal Given 6/9/25 4939)     Medical Decision  Making  60-year-old male with stroke-like symptoms.   No prior history of stroke.   LKN 9:00 p.m, approximately 7 hours prior to arrival-outside of TNK window  No movement of  RUE, RLE, right lower facial droop, whispered speech, sensation is intact equal bilateral.   NIH 13 points  CTA head/neck- small nonocclusive thrombus basilar artery. There is a small thrombus Right vertebral artery at V4 level, difficult to say whether acute or chronic  Follow Tele stroke consult-pending recommendation-see their notes for details  Given rectal aspirin, unable to swallow.   Admit to medicine for MRI/further evaluation/monitoring.      Amount and/or Complexity of Data Reviewed  Labs: ordered.  Radiology: ordered.    Risk  OTC drugs.  Prescription drug management.                                      Clinical Impression:  Final diagnoses:  [R29.818] Acute focal neurological deficit  [R29.90] Stroke-like symptom                       [1]   Social History  Tobacco Use    Smoking status: Never    Smokeless tobacco: Never   Substance Use Topics    Alcohol use: Yes     Comment: Socially    Drug use: No        Jarrod Burnette MD  06/09/25 [id number]

## 2025-06-10 PROBLEM — I63.02 STROKE DUE TO THROMBOSIS OF BASILAR ARTERY: Status: ACTIVE | Noted: 2025-06-10

## 2025-06-10 PROBLEM — F10.10 ALCOHOL ABUSE: Status: ACTIVE | Noted: 2025-06-10

## 2025-06-10 LAB
ABSOLUTE EOSINOPHIL (OHS): 0.06 K/UL
ABSOLUTE MONOCYTE (OHS): 0.52 K/UL (ref 0.3–1)
ABSOLUTE NEUTROPHIL COUNT (OHS): 5.11 K/UL (ref 1.8–7.7)
ALBUMIN SERPL BCP-MCNC: 3.5 G/DL (ref 3.5–5.2)
ALP SERPL-CCNC: 145 UNIT/L (ref 40–150)
ALT SERPL W/O P-5'-P-CCNC: 17 UNIT/L (ref 10–44)
ANION GAP (OHS): 10 MMOL/L (ref 8–16)
APTT PPP: 23.7 SECONDS (ref 21–32)
AST SERPL-CCNC: 19 UNIT/L (ref 11–45)
BASOPHILS # BLD AUTO: 0.06 K/UL
BASOPHILS NFR BLD AUTO: 0.9 %
BILIRUB SERPL-MCNC: 0.9 MG/DL (ref 0.1–1)
BUN SERPL-MCNC: 10 MG/DL (ref 6–20)
CALCIUM SERPL-MCNC: 8.7 MG/DL (ref 8.7–10.5)
CHLORIDE SERPL-SCNC: 108 MMOL/L (ref 95–110)
CO2 SERPL-SCNC: 22 MMOL/L (ref 23–29)
CREAT SERPL-MCNC: 0.9 MG/DL (ref 0.5–1.4)
ERYTHROCYTE [DISTWIDTH] IN BLOOD BY AUTOMATED COUNT: 12.5 % (ref 11.5–14.5)
GFR SERPLBLD CREATININE-BSD FMLA CKD-EPI: >60 ML/MIN/1.73/M2
GLUCOSE SERPL-MCNC: 109 MG/DL (ref 70–110)
HCT VFR BLD AUTO: 40.2 % (ref 40–54)
HGB BLD-MCNC: 14 GM/DL (ref 14–18)
IMM GRANULOCYTES # BLD AUTO: 0.03 K/UL (ref 0–0.04)
IMM GRANULOCYTES NFR BLD AUTO: 0.5 % (ref 0–0.5)
INR PPP: 1 (ref 0.8–1.2)
LYMPHOCYTES # BLD AUTO: 0.87 K/UL (ref 1–4.8)
MAGNESIUM SERPL-MCNC: 1.9 MG/DL (ref 1.6–2.6)
MCH RBC QN AUTO: 31.3 PG (ref 27–31)
MCHC RBC AUTO-ENTMCNC: 34.8 G/DL (ref 32–36)
MCV RBC AUTO: 90 FL (ref 82–98)
NUCLEATED RBC (/100WBC) (OHS): 0 /100 WBC
OHS QRS DURATION: 112 MS
OHS QTC CALCULATION: 493 MS
PHOSPHATE SERPL-MCNC: 3 MG/DL (ref 2.7–4.5)
PLATELET # BLD AUTO: 216 K/UL (ref 150–450)
PMV BLD AUTO: 10 FL (ref 9.2–12.9)
POTASSIUM SERPL-SCNC: 3.6 MMOL/L (ref 3.5–5.1)
PROT SERPL-MCNC: 6.6 GM/DL (ref 6–8.4)
PROTHROMBIN TIME: 11.2 SECONDS (ref 9–12.5)
RBC # BLD AUTO: 4.47 M/UL (ref 4.6–6.2)
RELATIVE EOSINOPHIL (OHS): 0.9 %
RELATIVE LYMPHOCYTE (OHS): 13.1 % (ref 18–48)
RELATIVE MONOCYTE (OHS): 7.8 % (ref 4–15)
RELATIVE NEUTROPHIL (OHS): 76.8 % (ref 38–73)
SODIUM SERPL-SCNC: 140 MMOL/L (ref 136–145)
TROPONIN I SERPL DL<=0.01 NG/ML-MCNC: <0.006 NG/ML
WBC # BLD AUTO: 6.65 K/UL (ref 3.9–12.7)

## 2025-06-10 PROCEDURE — G0427 INPT/ED TELECONSULT70: HCPCS | Mod: GT,,, | Performed by: NURSE PRACTITIONER

## 2025-06-10 PROCEDURE — 94761 N-INVAS EAR/PLS OXIMETRY MLT: CPT

## 2025-06-10 PROCEDURE — 80053 COMPREHEN METABOLIC PANEL: CPT | Performed by: NURSE PRACTITIONER

## 2025-06-10 PROCEDURE — 85025 COMPLETE CBC W/AUTO DIFF WBC: CPT | Performed by: NURSE PRACTITIONER

## 2025-06-10 PROCEDURE — 92526 ORAL FUNCTION THERAPY: CPT

## 2025-06-10 PROCEDURE — 85610 PROTHROMBIN TIME: CPT | Performed by: NURSE PRACTITIONER

## 2025-06-10 PROCEDURE — 97530 THERAPEUTIC ACTIVITIES: CPT

## 2025-06-10 PROCEDURE — 97110 THERAPEUTIC EXERCISES: CPT

## 2025-06-10 PROCEDURE — 85730 THROMBOPLASTIN TIME PARTIAL: CPT | Performed by: NURSE PRACTITIONER

## 2025-06-10 PROCEDURE — 84484 ASSAY OF TROPONIN QUANT: CPT | Performed by: NURSE PRACTITIONER

## 2025-06-10 PROCEDURE — 83735 ASSAY OF MAGNESIUM: CPT | Performed by: NURSE PRACTITIONER

## 2025-06-10 PROCEDURE — 84100 ASSAY OF PHOSPHORUS: CPT | Performed by: NURSE PRACTITIONER

## 2025-06-10 PROCEDURE — 92507 TX SP LANG VOICE COMM INDIV: CPT

## 2025-06-10 PROCEDURE — 25000003 PHARM REV CODE 250: Performed by: NURSE PRACTITIONER

## 2025-06-10 PROCEDURE — 99232 SBSQ HOSP IP/OBS MODERATE 35: CPT | Mod: ,,, | Performed by: NURSE PRACTITIONER

## 2025-06-10 PROCEDURE — 21400001 HC TELEMETRY ROOM

## 2025-06-10 RX ADMIN — LEVETIRACETAM 500 MG: 250 TABLET, FILM COATED ORAL at 08:06

## 2025-06-10 RX ADMIN — ASPIRIN 81 MG: 81 TABLET, CHEWABLE ORAL at 08:06

## 2025-06-10 RX ADMIN — ATORVASTATIN CALCIUM 80 MG: 40 TABLET, FILM COATED ORAL at 08:06

## 2025-06-10 RX ADMIN — FOLIC ACID 1 MG: 1 TABLET ORAL at 08:06

## 2025-06-10 RX ADMIN — Medication 100 MG: at 08:06

## 2025-06-10 RX ADMIN — THERA TABS 1 TABLET: TAB at 08:06

## 2025-06-10 RX ADMIN — CLOPIDOGREL 75 MG: 75 TABLET ORAL at 08:06

## 2025-06-10 NOTE — SUBJECTIVE & OBJECTIVE
Interval History:  Patient seen and examined.  Occupational therapy currently in the room during my examination and patient has just gotten up out of bed able to place some weight on his RLE.  He is able to move his RLE more.  His right arm remains flaccid, but he does have some movement out of his right shoulder.  He is in very good spirits today with his son at the bedside.  Case was also discussed with speech therapy who is recommending ice chips trial today.  We will continue IV fluids until patient is able to adequately hydrate.  Plan is for inpatient rehab and case management is facilitating placement.  We have reconsulted tele vascular Neurology for final recommendations.    Review of Systems   Constitutional:  Positive for activity change.   HENT:  Positive for sore throat.    Neurological:  Positive for facial asymmetry, speech difficulty and weakness.   All other systems reviewed and are negative.    Objective:     Vital Signs (Most Recent):  Temp: 97.6 °F (36.4 °C) (06/10/25 0722)  Pulse: 61 (06/10/25 0722)  Resp: 14 (06/10/25 0722)  BP: (!) 140/78 (06/10/25 0722)  SpO2: 95 % (06/10/25 0725) Vital Signs (24h Range):  Temp:  [97.6 °F (36.4 °C)-98.4 °F (36.9 °C)] 97.6 °F (36.4 °C)  Pulse:  [61-81] 61  Resp:  [14-18] 14  SpO2:  [92 %-96 %] 95 %  BP: (130-140)/(73-84) 140/78     Weight: 69.5 kg (153 lb 3.5 oz)  Body mass index is 21.98 kg/m².    Intake/Output Summary (Last 24 hours) at 6/10/2025 1131  Last data filed at 6/10/2025 0536  Gross per 24 hour   Intake 60 ml   Output 200 ml   Net -140 ml         Physical Exam  Constitutional:       General: He is not in acute distress.     Appearance: He is not toxic-appearing.   HENT:      Head: Normocephalic and atraumatic.      Mouth/Throat:      Mouth: Mucous membranes are moist.      Pharynx: Oropharynx is clear.   Eyes:      General: No scleral icterus.     Extraocular Movements: Extraocular movements intact.      Conjunctiva/sclera: Conjunctivae normal.    Cardiovascular:      Rate and Rhythm: Normal rate and regular rhythm.   Pulmonary:      Effort: Pulmonary effort is normal. No respiratory distress.   Abdominal:      Palpations: Abdomen is soft.   Musculoskeletal:      Cervical back: Normal range of motion and neck supple.      Right lower leg: No edema.      Left lower leg: No edema.      Comments:      Skin:     General: Skin is warm and dry.      Coloration: Skin is not jaundiced.   Neurological:      Mental Status: He is alert and oriented to person, place, and time.      Cranial Nerves: Facial asymmetry (right facial droop) present.      Motor: Weakness present.   Psychiatric:         Mood and Affect: Mood normal.         Behavior: Behavior normal.               Significant Labs: All pertinent labs within the past 24 hours have been reviewed.  CBC:   Recent Labs   Lab 06/09/25  0423 06/10/25  0505   WBC 5.30 6.65   HGB 14.6 14.0   HCT 41.7 40.2    216     CMP:   Recent Labs   Lab 06/09/25 0423 06/10/25  0505    140   K 3.8 3.6    108   CO2 20* 22*    109   BUN 13 10   CREATININE 0.9 0.9   CALCIUM 8.8 8.7   PROT 6.9 6.6   ALBUMIN 3.5 3.5   BILITOT 0.6 0.9   ALKPHOS 116 145   AST 26 19   ALT 20 17   ANIONGAP 15 10       Significant Imaging: I have reviewed all pertinent imaging results/findings within the past 24 hours.    Fl Modified Barium Swallow Speech [[id number]] Resulted: 06/09/25 1313   Order Status: Completed Updated: 06/09/25 1315   Narrative:     EXAMINATION:  FL MODIFIED BARIUM SWALLOW SPEECH STUDY    CLINICAL HISTORY:  dysphagia;    TECHNIQUE:  Fluoroscopy provided for modified barium swallow with speech pathology.    Total fluoroscopy time: 140 seconds    COMPARISON:  None    FINDINGS:  Limited evaluation demonstrates episodes of aspiration.   Impression:       Status post modified barium swallow.    Please refer to the complete report from speech pathology.      Electronically signed by: Tommy De La O  Date:  06/09/2025  Time: 13:13   MRI Brain Without Contrast [[id number]] (Abnormal) Resulted: 06/09/25 1236   Order Status: Completed Updated: 06/09/25 1239   Narrative:     EXAMINATION:  MRI BRAIN WITHOUT CONTRAST    CLINICAL HISTORY:  Stroke, follow up;.    TECHNIQUE:  Multiplanar multisequence MR imaging of the brain was performed without the administration of intravenous contrast.    COMPARISON:  CT head 06/09/2025    FINDINGS:  Abnormal region of diffusion restriction and corresponding mild increased T2/FLAIR signal within the left paramedian howard concerning for acute infarction.  Small additional adjacent focus of acute infarction involving the right paramedian howard.    Ventricles and sulci are normal in size for age without evidence of hydrocephalus. No extra-axial blood or fluid collections.    Extensive confluent and scattered foci of T2/FLAIR hyperintense signal again seen within the bilateral periventricular, deep and subcortical white matter, again nonspecific but most commonly reflecting chronic small vessel ischemic changes.  Superimposed small foci of encephalomalacia in the right centrum semiovale and left thalamus compatible with chronic lacunar infarcts.  Several punctate foci of susceptibility within the bilateral thalami consistent with chronic microhemorrhages.  No evidence of acute intracranial hemorrhage.  No significant intracranial mass effect or midline shift.    Normal vascular flow voids are present.    Bone marrow signal intensity is normal. Scattered mild mucosal membrane thickening in the paranasal sinuses.  Superimposed small polypoid T2 hyperintensity along the medial right maxillary antrum suggestive of a polyp or retention cyst.  The visualized portions of the mastoids are unremarkable.    Orbits are unremarkable.   Impression:       1. Acute infarcts involving the left greater than right paramedian howard.  No associated significant mass effect or acute intracranial hemorrhage.  2. Mild  generalized cerebral volume loss and extensive chronic small vessel ischemic changes, including chronic lacunar infarcts in the right centrum semiovale and left thalamus, and bilateral thalamic chronic microhemorrhages.  This report was flagged in Epic as abnormal.    The significant finding above was relayed by myself  via epic secure chat to Lashon Adam NP on 06/09/2025 at 12:32.  Findings were acknowledged and understood.      Electronically signed by: Jc Cunha  Date: 06/09/2025  Time: 12:36   XR NG/OG tube placement check, non-radiologist performed [[id number]] Resulted: 06/09/25 0848   Order Status: Completed Updated: 06/09/25 0850   Narrative:     EXAMINATION:  XR NG/OG TUBE PLACEMENT CHECK, NON-RADIOLOGIST PERFORMED    CLINICAL HISTORY:  ng tube;Encounter for other specified special examinations    TECHNIQUE:  Portable view of the chest and upper abdomen.    COMPARISON:  Same day.    FINDINGS:  Nasogastric tube repositioned with the distal tube coiled in the gastric fundus.      Electronically signed by: Tommy De La O  Date: 06/09/2025  Time: 08:48   XR NG/OG tube placement check, non-radiologist performed [[id number]] Resulted: 06/09/25 0847   Order Status: Completed Updated: 06/09/25 0850   Narrative:     EXAMINATION:  XR NG/OG TUBE PLACEMENT CHECK, NON-RADIOLOGIST PERFORMED    CLINICAL HISTORY:  og tube placement;Presence of other specified devices    TECHNIQUE:  Portable view of the chest and upper abdomen.    COMPARISON:  None    FINDINGS:  Nasogastric tube is coiled in the neck and needs to be repositioned.      Electronically signed by: Tommy De La O  Date: 06/09/2025  Time: 08:47

## 2025-06-10 NOTE — NURSING
Chart check reviewed. Vital signs stable. Patient alert and awake. More movement in right leg today and able to clear throat and cough when eating. No signs of aspirations. Suction at bedside

## 2025-06-10 NOTE — NURSING
Chart review completed. Set up tray for breakfast. Patient able to feed self, no s/s of aspiration. Spouse at bedside. Instructed on diet restrictions, feeding instructions, notifying staff for po intake. Verbalized understanding. Patient able to lift right leg from bed but cannot keep leg elevated. No change in RUE, unable to lift arm or move hand. Responds with gestures and hand movements appropriately. Able to respond yes/no. Oral suction at bedside.

## 2025-06-10 NOTE — PT/OT/SLP PROGRESS
Occupational Therapy   Treatment and Co-treatment with PT    Name: Drew Kaur  MRN: [id number]  Admitting Diagnosis:  Stroke       Recommendations:     Discharge Recommendations: High Intensity Therapy (IPR)  Discharge Equipment Recommendations:   (TBD)  Barriers to discharge:   (ongoing medical treatment, impaired ADL)    Assessment:     Drew Kaur is a 60 y.o. male with a medical diagnosis of Stroke.  Performance deficits affecting function are weakness, impaired endurance, impaired sensation, impaired self care skills, impaired functional mobility, impaired balance, decreased coordination, decreased upper extremity function, decreased lower extremity function, impaired fine motor, impaired coordination, decreased ROM, abnormal tone.     Rehab Prognosis:  Good; patient would benefit from acute skilled OT services to address these deficits and reach maximum level of function.       Plan:     Patient to be seen  (5x/week) to address the above listed problems via self-care/home management, therapeutic activities, therapeutic exercises, neuromuscular re-education, sensory integration  Plan of Care Expires: 06/23/25  Plan of Care Reviewed with: spouse, patient (RN, NP)    Subjective     Chief Complaint: No complaints  Pain/Comfort:  Pain Rating 1: 0/10    Objective:     Patient found up in chair with peripheral IV, telemetry, PureWick upon OT entry to room.    General Precautions: Standard, pureed diet, honey thick, aspiration, fall, seizure    Braces: UE Sling (Right)  Respiratory Status: Room air     Treatment & Education:  With R UE sling donned, patient completes sit to stand transfer with CGA x 2 and cues for technique. Patient completes weight shifting in standing while using jaspreet walker. He is able to weight shift laterally with CGA to min A x 2, cues, and no LOB. He tolerates standing and weight shifting x 3 minutes before fatiguing and returning to EOB.     Patient left supine with all lines  intact, call button in reach, and bed alarm on    GOALS:   Multidisciplinary Problems       Occupational Therapy Goals          Problem: Occupational Therapy    Goal Priority Disciplines Outcome Interventions   Occupational Therapy Goal     OT, PT/OT Ongoing    Description: Goals to be met by: 6/23/25    Patient will demonstrate increased functional independence with ADLs by performing:    Sitting EOB with SBA x 5 minutes.     Complete stand pivot transfer to chair with mod A x 1 person.    Complete upper body dressing with mod A and compensatory strategies as needed.                            Time Tracking:     OT Date of Treatment: 06/10/25  OT Start Time: 1347  OT Stop Time: 1406  OT Total Time (min): 19 min    Billable Minutes:Neuromuscular Re-education 19    OT/YARIEL: OT          6/10/2025

## 2025-06-10 NOTE — PROGRESS NOTES
[facility] Medicine  Progress Note    Patient Name: Drew Kaur  MRN: [id number]  Patient Class: IP- Inpatient   Admission Date: 6/9/2025  Length of Stay: 1 days  Attending Physician: Immanuel Hall MD  Primary Care Provider: Ishmael Izquierdo MD        Subjective     Principal Problem:Stroke        HPI:  Drew Kaur is a 60-year-old male with PMHx significant for HTN, seizures and GERD.  He presented to the ER with right-sided deficits and expressive aphasia.  Patient went to bed around 9:00 p.m. which was his last known well time.  When he awoke around 2:00 a.m. he was unable to move his right side.  He woke up his wife and 911 was called.  Code stroke was activated in the ED. CT of the head was obtained noting chronic changes but no evidence for acute intracranial process.  CT of the head neck revealed focal nonocclusive intraluminal thrombus of the basal artery, chronic versus acute occlusion of the V4 segment of right vertebral artery.  Recommendations were made for loading dose of Plavix 300 mg and loading dose of aspirin 325 mg.  He was noted to fail his Singh swallow eval and NG tube was placed.  Patient will be admitted to the services of Hospital Medicine for further evaluation and treatment.    Overview/Hospital Course:  No notes on file    Interval History:  Patient seen and examined.  Occupational therapy currently in the room during my examination and patient has just gotten up out of bed able to place some weight on his RLE.  He is able to move his RLE more.  His right arm remains flaccid, but he does have some movement out of his right shoulder.  He is in very good spirits today with his son at the bedside.  Case was also discussed with speech therapy who is recommending ice chips trial today.  We will continue IV fluids until patient is able to adequately hydrate.  Plan is for inpatient rehab and case management is facilitating placement.  We have reconsulted  tele vascular Neurology for final recommendations.    Review of Systems   Constitutional:  Positive for activity change.   HENT:  Positive for sore throat.    Neurological:  Positive for facial asymmetry, speech difficulty and weakness.   All other systems reviewed and are negative.    Objective:     Vital Signs (Most Recent):  Temp: 97.6 °F (36.4 °C) (06/10/25 0722)  Pulse: 61 (06/10/25 0722)  Resp: 14 (06/10/25 0722)  BP: (!) 140/78 (06/10/25 0722)  SpO2: 95 % (06/10/25 0725) Vital Signs (24h Range):  Temp:  [97.6 °F (36.4 °C)-98.4 °F (36.9 °C)] 97.6 °F (36.4 °C)  Pulse:  [61-81] 61  Resp:  [14-18] 14  SpO2:  [92 %-96 %] 95 %  BP: (130-140)/(73-84) 140/78     Weight: 69.5 kg (153 lb 3.5 oz)  Body mass index is 21.98 kg/m².    Intake/Output Summary (Last 24 hours) at 6/10/2025 1131  Last data filed at 6/10/2025 0536  Gross per 24 hour   Intake 60 ml   Output 200 ml   Net -140 ml         Physical Exam  Constitutional:       General: He is not in acute distress.     Appearance: He is not toxic-appearing.   HENT:      Head: Normocephalic and atraumatic.      Mouth/Throat:      Mouth: Mucous membranes are moist.      Pharynx: Oropharynx is clear.   Eyes:      General: No scleral icterus.     Extraocular Movements: Extraocular movements intact.      Conjunctiva/sclera: Conjunctivae normal.   Cardiovascular:      Rate and Rhythm: Normal rate and regular rhythm.   Pulmonary:      Effort: Pulmonary effort is normal. No respiratory distress.   Abdominal:      Palpations: Abdomen is soft.   Musculoskeletal:      Cervical back: Normal range of motion and neck supple.      Right lower leg: No edema.      Left lower leg: No edema.      Comments:      Skin:     General: Skin is warm and dry.      Coloration: Skin is not jaundiced.   Neurological:      Mental Status: He is alert and oriented to person, place, and time.      Cranial Nerves: Facial asymmetry (right facial droop) present.      Motor: Weakness present.   Psychiatric:          Mood and Affect: Mood normal.         Behavior: Behavior normal.               Significant Labs: All pertinent labs within the past 24 hours have been reviewed.  CBC:   Recent Labs   Lab 06/09/25  0423 06/10/25  0505   WBC 5.30 6.65   HGB 14.6 14.0   HCT 41.7 40.2    216     CMP:   Recent Labs   Lab 06/09/25  0423 06/10/25  0505    140   K 3.8 3.6    108   CO2 20* 22*    109   BUN 13 10   CREATININE 0.9 0.9   CALCIUM 8.8 8.7   PROT 6.9 6.6   ALBUMIN 3.5 3.5   BILITOT 0.6 0.9   ALKPHOS 116 145   AST 26 19   ALT 20 17   ANIONGAP 15 10       Significant Imaging: I have reviewed all pertinent imaging results/findings within the past 24 hours.    Fl Modified Barium Swallow Speech [[id number]] Resulted: 06/09/25 1313   Order Status: Completed Updated: 06/09/25 1315   Narrative:     EXAMINATION:  FL MODIFIED BARIUM SWALLOW SPEECH STUDY    CLINICAL HISTORY:  dysphagia;    TECHNIQUE:  Fluoroscopy provided for modified barium swallow with speech pathology.    Total fluoroscopy time: 140 seconds    COMPARISON:  None    FINDINGS:  Limited evaluation demonstrates episodes of aspiration.   Impression:       Status post modified barium swallow.    Please refer to the complete report from speech pathology.      Electronically signed by: Tommy De La O  Date: 06/09/2025  Time: 13:13   MRI Brain Without Contrast [[id number]] (Abnormal) Resulted: 06/09/25 1236   Order Status: Completed Updated: 06/09/25 1239   Narrative:     EXAMINATION:  MRI BRAIN WITHOUT CONTRAST    CLINICAL HISTORY:  Stroke, follow up;.    TECHNIQUE:  Multiplanar multisequence MR imaging of the brain was performed without the administration of intravenous contrast.    COMPARISON:  CT head 06/09/2025    FINDINGS:  Abnormal region of diffusion restriction and corresponding mild increased T2/FLAIR signal within the left paramedian howard concerning for acute infarction.  Small additional adjacent focus of acute infarction involving the  right paramedian howard.    Ventricles and sulci are normal in size for age without evidence of hydrocephalus. No extra-axial blood or fluid collections.    Extensive confluent and scattered foci of T2/FLAIR hyperintense signal again seen within the bilateral periventricular, deep and subcortical white matter, again nonspecific but most commonly reflecting chronic small vessel ischemic changes.  Superimposed small foci of encephalomalacia in the right centrum semiovale and left thalamus compatible with chronic lacunar infarcts.  Several punctate foci of susceptibility within the bilateral thalami consistent with chronic microhemorrhages.  No evidence of acute intracranial hemorrhage.  No significant intracranial mass effect or midline shift.    Normal vascular flow voids are present.    Bone marrow signal intensity is normal. Scattered mild mucosal membrane thickening in the paranasal sinuses.  Superimposed small polypoid T2 hyperintensity along the medial right maxillary antrum suggestive of a polyp or retention cyst.  The visualized portions of the mastoids are unremarkable.    Orbits are unremarkable.   Impression:       1. Acute infarcts involving the left greater than right paramedian howard.  No associated significant mass effect or acute intracranial hemorrhage.  2. Mild generalized cerebral volume loss and extensive chronic small vessel ischemic changes, including chronic lacunar infarcts in the right centrum semiovale and left thalamus, and bilateral thalamic chronic microhemorrhages.  This report was flagged in Epic as abnormal.    The significant finding above was relayed by myself  via epic secure chat to Lashon Adam NP on 06/09/2025 at 12:32.  Findings were acknowledged and understood.      Electronically signed by: Jc Cunha  Date: 06/09/2025  Time: 12:36   XR NG/OG tube placement check, non-radiologist performed [[id number]] Resulted: 06/09/25 0848   Order Status: Completed Updated: 06/09/25 0850    Narrative:     EXAMINATION:  XR NG/OG TUBE PLACEMENT CHECK, NON-RADIOLOGIST PERFORMED    CLINICAL HISTORY:  ng tube;Encounter for other specified special examinations    TECHNIQUE:  Portable view of the chest and upper abdomen.    COMPARISON:  Same day.    FINDINGS:  Nasogastric tube repositioned with the distal tube coiled in the gastric fundus.      Electronically signed by: Tommy De La O  Date: 06/09/2025  Time: 08:48   XR NG/OG tube placement check, non-radiologist performed [[id number]] Resulted: 06/09/25 0847   Order Status: Completed Updated: 06/09/25 0850   Narrative:     EXAMINATION:  XR NG/OG TUBE PLACEMENT CHECK, NON-RADIOLOGIST PERFORMED    CLINICAL HISTORY:  og tube placement;Presence of other specified devices    TECHNIQUE:  Portable view of the chest and upper abdomen.    COMPARISON:  None    FINDINGS:  Nasogastric tube is coiled in the neck and needs to be repositioned.      Electronically signed by: Tommy De La O  Date: 06/09/2025  Time: 08:47         Assessment & Plan  Stroke  Acute problem  Antithrombotics for secondary stroke prevention: Antiplatelets: Aspirin: 81 mg daily  Aspirin: 325 mg daily  Clopidogrel: 300 mg loading dose x 1, now  Clopidogrel: 75 mg daily    Statins for secondary stroke prevention and hyperlipidemia, if present:   Statins: Atorvastatin- 80 mg daily    Aggressive risk factor modification: HTN     Rehab efforts: The patient has been evaluated by a stroke team provider and the therapy needs have been fully considered based off the presenting complaints and exam findings. The following therapy evaluations are needed: PT evaluate and treat, OT evaluate and treat, SLP evaluate and treat    Diagnostics ordered/pending: CT scan of head without contrast to asses brain parenchyma, CTA Head to assess vasculature , CTA Neck/Arch to assess vasculature, HgbA1C to assess blood glucose levels, Lipid Profile to assess cholesterol levels, MRI head without contrast to assess brain  parenchyma, TTE to assess cardiac function/status , TSH to assess thyroid function    VTE prophylaxis: Heparin 5000 units SQ every 8 hours    BP parameters: Infarct: No intervention, SBP <220    Consult TeleVasc Neurology  Hypertension  Patient's blood pressure range in the last 24 hours was: BP  Min: 130/81  Max: 140/78.The patient's inpatient anti-hypertensive regimen is listed below:  Current Antihypertensives  , Daily, Oral  labetalol 20 mg/4 mL (5 mg/mL) IV syring, Every 15 min PRN, Intravenous    Plan  - BP is controlled, no changes needed to their regimen  - Holding BP meds in setting of acute neurological deficit, see plan for CVA  Seizure disorder  Continue Keppra      Alcohol abuse  Patient admits to drinking 5-6 cocktails daily   Place on CIWA protocol and monitor for DTs   P.r.n. IV Ativan  Multivitamins, thiamine, folic acid    VTE Risk Mitigation (From admission, onward)           Ordered     IP VTE HIGH RISK PATIENT  Once         06/09/25 0731     Place sequential compression device  Until discontinued         06/09/25 0731                    Discharge Planning   YVES: 6/11/2025     Code Status: Full Code   Medical Readiness for Discharge Date:   Discharge Plan A: Rehab                Please place Justification for DME        Lashon Dela Cruz NP  Department of Hospital Medicine   [facility]

## 2025-06-10 NOTE — HOSPITAL COURSE
Drew Kaur is a 60 year old male w/ PMH mitral valve and aortic valve insufficiency, CVA, HTN, seizure disorder, and ETOH abuse. He was admitted with acute stroke with right-sided hemiplegia and facial droop.  Code stroke was initiated.  He was not a candidate for tPA.  He was placed on stroke pathway and administered loading dose of aspirin and Plavix as well as initiated on high-intensity atorvastatin.  CT head, CTA of the head and neck, MRI of the brain, and echocardiogram were all obtained.  PT/OT/ST were consulted.  Patient was noted to have dysphagia and placed on pureed diet with honey thickened liquids.  Modified barium swallow study was obtained.  Overnight, patient did have some improvement in his weakness and swallowing abilities.  He was recommended for inpatient rehab.  Case management was consulted for placement. Patient assessed on day of discharge and deemed stable to discharge. Patient and spouse agreeable to plan.

## 2025-06-10 NOTE — CONSULTS
"[facility] Surg  Adult Nutrition   Consult Note (Nutrition Education)     SUMMARY     Nutrition Education    Previous Education: No    Diet at home: Unable to assess at this time.     Per SLP note "Family stated that earlier this morning patient was intermittently able to use clear speech to communicate." Pt plan to discharge to [facility]. Education materials provided in discharge paperwork.     Discussed with: Remote Education     Educational Need? Yes    Barriers: Remote Education     Interventions: Sodium, fat modified diet         "

## 2025-06-10 NOTE — SUBJECTIVE & OBJECTIVE
HPI:  60 y.o. male HTN, ETOH abuse, GERD, seizures presented with right sided weakness and speech difficulty.  Stroke code activated and patient seen by TeleStroke Team.  NIH 13.  Patient not treated with thrombolytic therapy due to being outside treatment window.  No LVO noted.  Patient admitted for further stroke workup.     Images personally reviewed and interpreted:  Study: Head CT, CTA Head & Neck, and MRI Brain  Study Interpretation: CT head: No early infarct signs.  No hemorrhage.  No mass effect. CTA head and neck: Filling defect mid basilar artery.  MRI Brain: Diffusion restriction left greater than right paramedian howard.  No hemorrhage.    Additional studies reviewed:   Study: Cardiac_Neuro: 2D echo  Study Interpretation: EF 55-60%; no wall motion abnormality noted; no thrombus/vegetation/shunt; normal left atrium.      Laboratory studies reviewed:  BMP:   Lab Results   Component Value Date     06/10/2025     08/19/2024    K 3.6 06/10/2025    K 3.9 08/19/2024     06/10/2025     08/19/2024    CO2 22 (L) 06/10/2025    CO2 17 (L) 08/19/2024    BUN 10 06/10/2025    CREATININE 0.9 06/10/2025    CALCIUM 8.7 06/10/2025    CALCIUM 10.0 08/19/2024     CBC:   Lab Results   Component Value Date    WBC 6.65 06/10/2025    RBC 4.47 (L) 06/10/2025    RBC 4.99 08/19/2024    HGB 14.0 06/10/2025    HGB 15.3 08/19/2024    HCT 40.2 06/10/2025    HCT 44.8 08/19/2024     06/10/2025     08/19/2024    MCV 90 06/10/2025    MCV 90 08/19/2024    MCH 31.3 (H) 06/10/2025    MCHC 34.8 06/10/2025    MCHC 34.2 08/19/2024     Lipid Panel:   Lab Results   Component Value Date    CHOL 163 06/09/2025    CHOL 141 07/05/2022    LDLCALC 40.6 (L) 06/09/2025    HDL 90 (H) 06/09/2025    TRIG 162 (H) 06/09/2025    TRIG 156 (H) 07/05/2022     Coagulation:   Lab Results   Component Value Date    INR 1.0 06/10/2025    INR 1.0 06/03/2022    APTT 23.7 06/10/2025     Hgb A1C:   Lab Results   Component Value Date     HGBA1C 5.2 06/09/2025    HGBA1C 5.3 07/05/2022     TSH:   Lab Results   Component Value Date    TSH 2.805 06/09/2025    TSH 2.825 06/03/2022       Documentation personally reviewed:  Notes: Notes: ED notes, H&P, and Consult notes from: ROBERT     Assessment and plan:  60 y.o. male HTN, ETOH abuse, GERD, seizures now with left greater than right paramedian pontine infarct. Small filling defect mid basilar artery which may represent subocclusive thrombus versus artifact. Given stroke location and appearance suspect this may be pathology.    Recommendations  ASA 81mg daily with Plavix 75mg daily x 21 days then monotherapy with ASA thereafter  No indication for Statin - currently at goal LDL  Aggressive stroke risk factor modification: HTN, ETOH abuse  Follow therapy recommendations  Can slowly reduce BP over next 24-48 hours - avoid aggressive BP lowering and sudden drops in BP that can lead to hypoperfusion  No further inpatient stroke workup and patient can dispo with therapy recommendations once medically ready  Please contact us with any further questions or concerns or if patient has any acute neurological changes (new symptoms, worsening deficits).      Post charge discharge plan:  Clinic follow up: Vascular Neurology    Visit Type: in person or virtual  Timeframe: 4 weeks    Collaborating Physician, Dr. Masters, was available during today's encounter. Any change to plan along with cosign to appear in the EMR.

## 2025-06-10 NOTE — PLAN OF CARE
06/10/25 1630   Post-Acute Status   Post-Acute Authorization Placement   Post-Acute Placement Status Set-up Complete/Auth obtained   Discharge Delays (!) Ambulance Transport/Facility Transport   Discharge Plan   Discharge Plan A Rehab     Patient & spouse notified that he has been approved for inpatient rehab at [facility]. They will provide transportation to the facility at 12:30pm tomorrow. Wife will bring clothes for him & may follow him there. No other needs at this time.

## 2025-06-10 NOTE — PT/OT/SLP PROGRESS
Speech Language Pathology Treatment    Patient Name:  Drew Kaur   MRN:  [id number]  Admitting Diagnosis: Stroke    Recommendations:                 General Recommendations:  Dysphagia therapy and Speech/language therapy  Diet recommendations:  Puree, Liquid Diet Level: Moderately thick/Honey thick liquids - IDDSI Level 3 (NO STRAWS)   Aspiration Precautions: use good oral hygiene, sit upright for all PO intake, increase physical mobility as tolerated, feed only when wake and alert, small bites and sips, NO straws, Slow pace, Respites as needed, allow extra time for meals, and 1:1 feeding assist by nursing staff only   General Precautions: Standard, pureed diet, honey thick, aspiration    Assessment:     Drew Kaur is a 60 y.o. male with a primary problem of stroke. RN reported that patient is tolerating current diet recommendations void overt s/s of aspiration. At bedside, patient presented with positive disposition and participation during treatment. It was noted that patient exhibits expressive language deficits 2' use of single word or short phrases to communicate and answer questions. Vocal quality continues to present as aphonic and breathy. Articulatory breakdowns noted when asking patient to describe objects in the room 2' to decreased intelligibility and imprecise articulatory contact. Family stated that earlier this morning patient was intermittently able to use clear speech to communicate.    Oral care performed prior to PO trials. Overt s/s of aspiration noted with ice chips and sips of thin liquid by spoon 2' intermittent expectorant cough reflex and delayed initiation of swallow reflex. Prolonged mastication evident with mixed consistencies. At this time, ST continues to recommend pureed texture diet and honey thickened liquids. RN notified that patient may receive ice chips sparingly during the day for pleasure (with thorough oral care performed prior to administration/given by nursing  staff only). Ice chips should not be given during meal times or with honey thickened liquids. ST will continue to follow.     Subjective   Patient seated upright in bedside chair. Family present at bedside. Suction set up and accessible to patient. Patient was pleasant and agreeable to treatment.     Pain/Comfort:  Pain Rating 1: 0/10      Objective:     Has the patient been evaluated by SLP for swallowing?   Yes  Keep patient NPO? No     Goals:   Multidisciplinary Problems       SLP Goals          Problem: SLP    Goal Priority Disciplines Outcome   SLP Goal     SLP Progressing   Description: 1. Patient will maintain adequate hydration/nutrition with optimum safety and efficiency of swallowing function on P.O. intake without overt signs and symptoms of aspiration for the highest appropriate diet level.    2. Patient will develop functional skills and utilize compensatory strategies to communicate wants and needs effectively to different conversational partners, maintain safety and participate socially in functional living environment.                        Plan:     Patient to be seen:  5 x/week   Plan of Care expires:  06/23/25  Plan of Care reviewed with:  patient, spouse, family, other (see comments) (RN)   SLP Follow-Up:  Yes       Discharge recommendations:  High Intensity Therapy   Barriers to Discharge:  None    Time Tracking:     SLP Treatment Date:   06/10/25  Speech Start Time:  1018  Speech Stop Time:  1056     Speech Total Time (min):  38 min    Billable Minutes: Speech Therapy Individual 19 min and Treatment Swallowing Dysfunction 19 min    06/10/2025

## 2025-06-10 NOTE — PT/OT/SLP PROGRESS
Physical Therapy Treatment    Patient Name:  Drew Kaur   MRN:  [id number]    Recommendations:     Discharge Recommendations: High Intensity Therapy (Inpatient Rehab)  Discharge Equipment Recommendations:  (TBD)  Barriers to discharge: Increased level of assistance, ongoing medical treatment    Assessment:     Drew Kaur is a 60 y.o. male admitted with a medical diagnosis of Stroke.  He presents with the following impairments/functional limitations: weakness, impaired functional mobility, gait instability, impaired balance, decreased coordination, decreased upper extremity function, decreased lower extremity function, abnormal tone.    Rehab Prognosis: Good; patient would benefit from acute skilled PT services to address these deficits and reach maximum level of function.    Recent Surgery: * No surgery found *      Plan:     During this hospitalization, patient to be seen 5 x/week to address the identified rehab impairments via gait training, therapeutic activities, therapeutic exercises and progress toward the following goals:    Plan of Care Expires:  06/23/25    Subjective     Chief Complaint: The patient reports he is moving his right leg a little better today.  He is talking a little more but with only one word responses.  Patient/Family Comments/goals: The patient would like to return to independent mobility.  Pain/Comfort:  Pain Rating 1: 0/10      Objective:     Communicated with nurse prior to session.  Patient found sitting in the chair with telemetry, PureWick, peripheral IV upon PT entry to room.  Multiple family members are present.    General Precautions: Standard, fall  Orthopedic Precautions: N/A  Braces: UE Sling  Respiratory Status: Room air     Treatment & Education:  The patient performed the following exercises x 20 reps each with assistance: ankle pumps, long arc quads, sitting hip flexion, sitting knee flexion, and sitting hip abduction.  The patient was encouraged to stay up  in the chair as long as he can tolerate, feed himself as much as he can, and to talk as much as possible.      Patient left up in chair with all lines intact, call button in reach, and family present..    GOALS:   Multidisciplinary Problems       Physical Therapy Goals          Problem: Physical Therapy    Goal Priority Disciplines Outcome Interventions   Physical Therapy Goal     PT, PT/OT Ongoing    Description: Goals    Patient to increase right lower extremity strength by 1/2 muscle grade.  Patient to roll left and right with min assistance.  Patient to transfer supine <> sit with min assistance.  Patient to transfer bed <> chair via stand-pivot with mod assistance.  Patient to ambulate with most appropriate AD  and mod assistance > 15 feet.                         DME Justifications:  No DME recommended requiring DME justifications    Time Tracking:     PT Received On: 06/10/25  PT Start Time: 1050     PT Stop Time: 1115  PT Total Time (min): 25 min     Billable Minutes: Therapeutic Exercise 25    Treatment Type: Treatment  PT/PTA: PT     Number of PTA visits since last PT visit: 0     06/10/2025

## 2025-06-10 NOTE — PLAN OF CARE
Patient was seen at bedside and told that [facility] Representative would come to see him this am to discuss inpatient Rehab. Patient was provide with local agencies with AA . Auth sent from [facility].

## 2025-06-10 NOTE — TELEMEDICINE CONSULT
[facility]  Tele-Vascular Neurology   Consult Note      Consult Information  Inpatient consult to Neurology Services (Vascular Neurology)  Consult performed by: Rayna Lim NP  Consult ordered by: Lashon Adam NP          Consulting Provider: ADELINA BURK   Current Providers  No providers found    Patient Location:  [facility] MEDICAL SURGICAL UNIT IP Unit    Spoke hospital nurse at bedside with patient assisting consultant.  Patient information was obtained from patient and spouse/SO.       Vascular Neurology Documentation  Acute Stroke Times   Last Known Normal Date: 06/09/25  Last Known Normal Time: 2100  Stroke Team Called Date: 06/09/25  Stroke Team Called Time: 0435  Stroke Team Arrival Date: 06/09/25  Stroke Team Arrival Time: 0435  CT Interpretation Time: 0437  Thrombolytic Therapy Recommended: No  CTA Interpretation Time: 0440  Thrombectomy Recommended: No    NIH Scale:  1a. Level of Consciousness: 0-->Alert, keenly responsive  1b. LOC Questions: 0-->Answers both questions correctly  1c. LOC Commands: 0-->Performs both tasks correctly  2. Best Gaze: 0-->Normal  3. Visual: 0-->No visual loss  4. Facial Palsy: 2-->Partial paralysis (total or near-total paralysis of lower face)  5a. Motor Arm, Left: 0-->No drift, limb holds 90 (or 45) degrees for full 10 secs  5b. Motor Arm, Right: 3-->No effort against gravity, limb falls  6a. Motor Leg, Left: 0-->No drift, leg holds 30 degree position for full 5 secs  6b. Motor Leg, Right: 2-->Some effort against gravity, leg falls to bed by 5 secs, but has some effort against gravity  7. Limb Ataxia: 0-->Absent  8. Sensory: 0-->Normal, no sensory loss  9. Best Language: 1-->Mild-to-moderate aphasia, some obvious loss of fluency or facility of comprehension, without significant limitation on ideas expressed or form of expression. Reduction of speech and/or comprehension, however, makes conversation. . . (see row details)  10. Dysarthria: 1-->Mild-to-moderate  "dysarthria, patient slurs at least some words and, at worst, can be understood with some difficulty  11. Extinction and Inattention (formerly Neglect): 0-->No abnormality  Total (NIH Stroke Scale): 9      Modified Gavin: Score: 0  Nany Coma Scale:     ABCD2 Score:    [id number] Score:    HAS -BLED Score:    ICH Score:    Hunt & Matson Classification:      Blood pressure 125/80, pulse 71, temperature 98.6 °F (37 °C), resp. rate 16, height 5' 10" (1.778 m), weight 69.5 kg (153 lb 3.5 oz), SpO2 96%.    VAN Stroke Assessment: Positive    Medical Decision Making  HPI:  60 y.o. male HTN, ETOH abuse, GERD, seizures presented with right sided weakness and speech difficulty.  Stroke code activated and patient seen by TeleStroke Team.  NIH 13.  Patient not treated with thrombolytic therapy due to being outside treatment window.  No LVO noted.  Patient admitted for further stroke workup.     Images personally reviewed and interpreted:  Study: Head CT, CTA Head & Neck, and MRI Brain  Study Interpretation: CT head: No early infarct signs.  No hemorrhage.  No mass effect. CTA head and neck: Filling defect mid basilar artery.  MRI Brain: Diffusion restriction left greater than right paramedian howard.  No hemorrhage.    Additional studies reviewed:   Study: Cardiac_Neuro: 2D echo  Study Interpretation: EF 55-60%; no wall motion abnormality noted; no thrombus/vegetation/shunt; normal left atrium.      Laboratory studies reviewed:  BMP:   Lab Results   Component Value Date     06/10/2025     08/19/2024    K 3.6 06/10/2025    K 3.9 08/19/2024     06/10/2025     08/19/2024    CO2 22 (L) 06/10/2025    CO2 17 (L) 08/19/2024    BUN 10 06/10/2025    CREATININE 0.9 06/10/2025    CALCIUM 8.7 06/10/2025    CALCIUM 10.0 08/19/2024     CBC:   Lab Results   Component Value Date    WBC 6.65 06/10/2025    RBC 4.47 (L) 06/10/2025    RBC 4.99 08/19/2024    HGB 14.0 06/10/2025    HGB 15.3 08/19/2024    HCT 40.2 06/10/2025 "    HCT 44.8 08/19/2024     06/10/2025     08/19/2024    MCV 90 06/10/2025    MCV 90 08/19/2024    MCH 31.3 (H) 06/10/2025    MCHC 34.8 06/10/2025    MCHC 34.2 08/19/2024     Lipid Panel:   Lab Results   Component Value Date    CHOL 163 06/09/2025    CHOL 141 07/05/2022    LDLCALC 40.6 (L) 06/09/2025    HDL 90 (H) 06/09/2025    TRIG 162 (H) 06/09/2025    TRIG 156 (H) 07/05/2022     Coagulation:   Lab Results   Component Value Date    INR 1.0 06/10/2025    INR 1.0 06/03/2022    APTT 23.7 06/10/2025     Hgb A1C:   Lab Results   Component Value Date    HGBA1C 5.2 06/09/2025    HGBA1C 5.3 07/05/2022     TSH:   Lab Results   Component Value Date    TSH 2.805 06/09/2025    TSH 2.825 06/03/2022       Documentation personally reviewed:  Notes: Notes: ED notes, H&P, and Consult notes from: VN     Assessment and plan:  60 y.o. male HTN, ETOH abuse, GERD, seizures now with left greater than right paramedian pontine infarct. Small filling defect mid basilar artery which may represent subocclusive thrombus versus artifact. Given stroke location and appearance suspect this may be pathology.    Recommendations  ASA 81mg daily with Plavix 75mg daily x 21 days then monotherapy with ASA thereafter  No indication for Statin - currently at goal LDL  Aggressive stroke risk factor modification: HTN, ETOH abuse  Follow therapy recommendations  Can slowly reduce BP over next 24-48 hours - avoid aggressive BP lowering and sudden drops in BP that can lead to hypoperfusion  No further inpatient stroke workup and patient can dispo with therapy recommendations once medically ready  Please contact us with any further questions or concerns or if patient has any acute neurological changes (new symptoms, worsening deficits).      Post charge discharge plan:  Clinic follow up: Vascular Neurology    Visit Type: in person or virtual  Timeframe: 4 weeks    Collaborating Physician, Dr. Masters, was available during today's encounter. Any  change to plan along with cosign to appear in the EMR.      Additional Physical Exam, History, & ROS  ROS  Physical Exam    Neurological Exam  LOC: alert  Attention Span: Good   Language: Expressive aphasia  Articulation: Dysarthria  Orientation: Person, Place, Time   Visual Fields: Full  EOM (CN III, IV, VI): Full/intact  Facial Sensation (CN V): Normal  Facial Movement (CN VII): Lower facial weakness on the Right  Motor: Arm left    Full antigravity; Full power noted with nurse assistance  Leg left    Full antigravity; Full power noted with nurse assistance  Arm right    No antigravity  Leg right   Some antigravity  Cerebellum: No evidence of appendicular or axial ataxia  Sensation: Intact to light touch      Past Medical History:   Diagnosis Date    GERD (gastroesophageal reflux disease)     Hypertension     Seizures      Past Surgical History:   Procedure Laterality Date    EYE SURGERY       Family History   Problem Relation Name Age of Onset    Diabetes Mother      Hypertension Mother      Stroke Father      Heart attack Father      No Known Problems Sister      Hypertension Brother      No Known Problems Sister      No Known Problems Sister      Arthritis Brother      No Known Problems Brother         Diagnoses  Problem Noted   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 Audiovisual encounter with the patient performed using a secure connection.  Results and impressions from the visit are documented on this note and were communicated to the consulting provider/team via direct communication. The note has been shared for addition to the patients electronic medical record.

## 2025-06-10 NOTE — PT/OT/SLP PROGRESS
Occupational Therapy   Treatment    Name: Drew Kaur  MRN: [id number]  Admitting Diagnosis:  Stroke       Recommendations:     Discharge Recommendations: High Intensity Therapy (IPR)  Discharge Equipment Recommendations:   (TBD)  Barriers to discharge:   (ongoing medical treatment, impaired ADL)    Assessment:     Drew Kaur is a 60 y.o. male with a medical diagnosis of Stroke. Performance deficits affecting function are weakness, impaired endurance, impaired sensation, impaired self care skills, impaired functional mobility, impaired balance, decreased coordination, decreased upper extremity function, decreased lower extremity function, impaired fine motor, impaired coordination, decreased ROM, abnormal tone.     Rehab Prognosis:  Good; patient would benefit from acute skilled OT services to address these deficits and reach maximum level of function.       Plan:     Patient to be seen  (5x/week) to address the above listed problems via self-care/home management, therapeutic activities, therapeutic exercises, neuromuscular re-education, sensory integration  Plan of Care Expires: 06/23/25  Plan of Care Reviewed with: spouse, patient     Subjective     Chief Complaint: no complaints  Pain/Comfort:  Pain Rating 1: 0/10    Objective:     Communicated with: Nurse prior to session.  Patient found HOB elevated with peripheral IV, telemetry, PureWick upon OT entry to room.    General Precautions: Standard, pureed diet, honey thick, aspiration, fall, seizure    Braces: UE Sling (Right)  Respiratory Status: Room air     Occupational Performance:     Bed Mobility:    Patient completed Supine to Sit with minimum assistance     Functional Mobility/Transfers:  Patient completed Sit <> Stand Transfer with moderate assistance and of 1 persons  with  no assistive device   Patient completed Bed <> Chair Transfer using Stand Pivot technique with moderate assistance and of 1 persons with no assistive device      [facility] 6  Click ADL: 12    Treatment & Education:  Patient sits EOB with close S, maintains midline position without assist. He is able to weight shift laterally and anteriorly with CGA.     He maintains both feet on the floor while EOB. During transfer training, he is able to maintain weight bearing through R LE during transfer. He sits upright in chair following session for 3.5 hours following session.     Patient left up in chair with all lines intact, call button in reach, and chair alarm on    GOALS:   Multidisciplinary Problems       Occupational Therapy Goals          Problem: Occupational Therapy    Goal Priority Disciplines Outcome Interventions   Occupational Therapy Goal     OT, PT/OT Ongoing    Description: Goals to be met by: 6/23/25    Patient will demonstrate increased functional independence with ADLs by performing:    Sitting EOB with SBA x 5 minutes.     Complete stand pivot transfer to chair with mod A x 1 person.    Complete upper body dressing with mod A and compensatory strategies as needed.                          Time Tracking:     OT Date of Treatment: 06/10/25  OT Start Time: 0955  OT Stop Time: 1017  OT Total Time (min): 22 min    Billable Minutes:Therapeutic Activity 22    OT/YARIEL: OT          6/10/2025

## 2025-06-10 NOTE — NURSING
Multiple visitors today. Reinforced teaching on feeding and diet restrictions. Spouse and family verbalize understanding.

## 2025-06-10 NOTE — PLAN OF CARE
Problem: Stroke, Ischemic (Includes Transient Ischemic Attack)  Goal: Optimal Cerebral Tissue Perfusion  Outcome: Progressing     Problem: Stroke, Ischemic (Includes Transient Ischemic Attack)  Goal: Optimal Coping  Outcome: Progressing     Problem: Stroke, Ischemic (Includes Transient Ischemic Attack)  Goal: Optimal Nutrition Intake  Outcome: Progressing     Problem: Stroke, Ischemic (Includes Transient Ischemic Attack)  Goal: Improved Sensorimotor Function  Outcome: Progressing

## 2025-06-10 NOTE — PLAN OF CARE
Problem: Physical Therapy  Goal: Physical Therapy Goal  Description: Goals    Patient to increase right lower extremity strength by 1/2 muscle grade.  Patient to roll left and right with min assistance.  Patient to transfer supine <> sit with min assistance.  Patient to transfer bed <> chair via stand-pivot with mod assistance.  Patient to ambulate with most appropriate AD  and mod assistance > 15 feet.    Outcome: Progressing

## 2025-06-10 NOTE — PT/OT/SLP PROGRESS
Physical Therapy Treatment  Co-Treat with OT    Patient Name:  Drew Kaur   MRN:  [id number]    Recommendations:     Discharge Recommendations: High Intensity Therapy (Inpatient Rehab)  Discharge Equipment Recommendations:  (TBD)  Barriers to discharge: Increased level of assistance, ongoing medical treatment    Assessment:     Drew Kaur is a 60 y.o. male admitted with a medical diagnosis of Stroke.  He presents with the following impairments/functional limitations: weakness, impaired functional mobility, gait instability, impaired balance, decreased coordination, decreased upper extremity function, decreased lower extremity function, abnormal tone.    Rehab Prognosis: Good; patient would benefit from acute skilled PT services to address these deficits and reach maximum level of function.    Recent Surgery: * No surgery found *      Plan:     During this hospitalization, patient to be seen 5 x/week to address the identified rehab impairments via gait training, therapeutic activities, therapeutic exercises and progress toward the following goals:    Plan of Care Expires:  06/23/25    Subjective     Chief Complaint: No new complaints.  Patient/Family Comments/goals: The patient would like to return to independent mobility.  Pain/Comfort:  Pain Rating 1: 0/10      Objective:     Communicated with nurse prior to session.  Patient found supine with telemetry, PureWick, peripheral IV upon PT entry to room.     General Precautions: Standard, fall  Orthopedic Precautions: N/A  Braces: UE Sling  Respiratory Status: Room air     Functional Mobility:  Bed Mobility:     Supine to Sit: minimum assistance  Sit to Supine: minimum assistance  Transfers:     Sit to Stand:  contact guard assistance of 2 persons with hemiwalker in his left hand  Gait: Ambulates with hemiwalker min assist of 2 persons    Treatment & Education:  The patient received gait training in coordination with the occupational therapist using the  hemiwalker in his left hand.  He walked approximately 5 feet with physical assistance walker placement, and frequent verbal cues for foot placement, and weight shifting.  He did wear the right upper extremity sling to prevent subluxation of the right shoulder.    Patient left supine with all lines intact, call button in reach, nurse notified, and wife present..    GOALS:   Multidisciplinary Problems       Physical Therapy Goals          Problem: Physical Therapy    Goal Priority Disciplines Outcome Interventions   Physical Therapy Goal     PT, PT/OT Progressing    Description: Goals    Patient to increase right lower extremity strength by 1/2 muscle grade.  Patient to roll left and right with min assistance.  Patient to transfer supine <> sit with min assistance.  Patient to transfer bed <> chair via stand-pivot with mod assistance.  Patient to ambulate with most appropriate AD  and mod assistance > 15 feet.                         DME Justifications:  No DME recommended requiring DME justifications    Time Tracking:     PT Received On: 06/10/25  PT Start Time: 1346     PT Stop Time: 1406  PT Total Time (min): 20 min     Billable Minutes: 0 mins    Treatment Type: Treatment  PT/PTA: PT     Number of PTA visits since last PT visit: 0     06/10/2025

## 2025-06-10 NOTE — NURSING
Chart check completed.     R sided weakness- RLE some movement, RUE no movement. Oral care done before any PO intake. Aspiration and seizure precautions in place.    0010: No urine at of yet on shift. Bladder scan done. 300-350mL urine on scan. NP notified. No new orders given yet.Will recheck at 0400 vitals.

## 2025-06-11 ENCOUNTER — TELEPHONE (OUTPATIENT)
Dept: CARDIOLOGY | Facility: CLINIC | Age: 60
End: 2025-06-11
Payer: COMMERCIAL

## 2025-06-11 VITALS
SYSTOLIC BLOOD PRESSURE: 152 MMHG | OXYGEN SATURATION: 96 % | HEART RATE: 69 BPM | HEIGHT: 70 IN | DIASTOLIC BLOOD PRESSURE: 77 MMHG | RESPIRATION RATE: 18 BRPM | TEMPERATURE: 98 F | WEIGHT: 153.25 LBS | BODY MASS INDEX: 21.94 KG/M2

## 2025-06-11 LAB
ABSOLUTE EOSINOPHIL (OHS): 0.12 K/UL
ABSOLUTE MONOCYTE (OHS): 0.43 K/UL (ref 0.3–1)
ABSOLUTE NEUTROPHIL COUNT (OHS): 3.71 K/UL (ref 1.8–7.7)
ALBUMIN SERPL BCP-MCNC: 3.4 G/DL (ref 3.5–5.2)
ALP SERPL-CCNC: 137 UNIT/L (ref 40–150)
ALT SERPL W/O P-5'-P-CCNC: 15 UNIT/L (ref 10–44)
ANION GAP (OHS): 12 MMOL/L (ref 8–16)
AST SERPL-CCNC: 19 UNIT/L (ref 11–45)
BASOPHILS # BLD AUTO: 0.07 K/UL
BASOPHILS NFR BLD AUTO: 1.3 %
BILIRUB SERPL-MCNC: 0.8 MG/DL (ref 0.1–1)
BUN SERPL-MCNC: 8 MG/DL (ref 6–20)
CALCIUM SERPL-MCNC: 8.7 MG/DL (ref 8.7–10.5)
CHLORIDE SERPL-SCNC: 105 MMOL/L (ref 95–110)
CO2 SERPL-SCNC: 22 MMOL/L (ref 23–29)
CREAT SERPL-MCNC: 0.8 MG/DL (ref 0.5–1.4)
ERYTHROCYTE [DISTWIDTH] IN BLOOD BY AUTOMATED COUNT: 12.6 % (ref 11.5–14.5)
GFR SERPLBLD CREATININE-BSD FMLA CKD-EPI: >60 ML/MIN/1.73/M2
GLUCOSE SERPL-MCNC: 90 MG/DL (ref 70–110)
HCT VFR BLD AUTO: 39.9 % (ref 40–54)
HGB BLD-MCNC: 13.7 GM/DL (ref 14–18)
IMM GRANULOCYTES # BLD AUTO: 0.01 K/UL (ref 0–0.04)
IMM GRANULOCYTES NFR BLD AUTO: 0.2 % (ref 0–0.5)
LYMPHOCYTES # BLD AUTO: 0.86 K/UL (ref 1–4.8)
MCH RBC QN AUTO: 31.1 PG (ref 27–31)
MCHC RBC AUTO-ENTMCNC: 34.3 G/DL (ref 32–36)
MCV RBC AUTO: 91 FL (ref 82–98)
NUCLEATED RBC (/100WBC) (OHS): 0 /100 WBC
PLATELET # BLD AUTO: 207 K/UL (ref 150–450)
PMV BLD AUTO: 10.2 FL (ref 9.2–12.9)
POTASSIUM SERPL-SCNC: 3.4 MMOL/L (ref 3.5–5.1)
PROT SERPL-MCNC: 6.6 GM/DL (ref 6–8.4)
RBC # BLD AUTO: 4.4 M/UL (ref 4.6–6.2)
RELATIVE EOSINOPHIL (OHS): 2.3 %
RELATIVE LYMPHOCYTE (OHS): 16.5 % (ref 18–48)
RELATIVE MONOCYTE (OHS): 8.3 % (ref 4–15)
RELATIVE NEUTROPHIL (OHS): 71.4 % (ref 38–73)
SODIUM SERPL-SCNC: 139 MMOL/L (ref 136–145)
WBC # BLD AUTO: 5.2 K/UL (ref 3.9–12.7)

## 2025-06-11 PROCEDURE — 25000003 PHARM REV CODE 250: Performed by: NURSE PRACTITIONER

## 2025-06-11 PROCEDURE — 85025 COMPLETE CBC W/AUTO DIFF WBC: CPT | Performed by: NURSE PRACTITIONER

## 2025-06-11 PROCEDURE — 97110 THERAPEUTIC EXERCISES: CPT

## 2025-06-11 PROCEDURE — 80053 COMPREHEN METABOLIC PANEL: CPT | Performed by: NURSE PRACTITIONER

## 2025-06-11 PROCEDURE — 97116 GAIT TRAINING THERAPY: CPT

## 2025-06-11 PROCEDURE — 99239 HOSP IP/OBS DSCHRG MGMT >30: CPT | Mod: ,,, | Performed by: NURSE PRACTITIONER

## 2025-06-11 PROCEDURE — 94760 N-INVAS EAR/PLS OXIMETRY 1: CPT

## 2025-06-11 RX ORDER — BISACODYL 10 MG/1
10 SUPPOSITORY RECTAL DAILY PRN
OUTPATIENT
Start: 2025-06-11

## 2025-06-11 RX ORDER — THIAMINE HCL 100 MG
100 TABLET ORAL DAILY
OUTPATIENT
Start: 2025-06-12

## 2025-06-11 RX ORDER — POTASSIUM CHLORIDE 20 MEQ/1
20 TABLET, EXTENDED RELEASE ORAL ONCE
Status: COMPLETED | OUTPATIENT
Start: 2025-06-11 | End: 2025-06-11

## 2025-06-11 RX ORDER — CLOPIDOGREL BISULFATE 75 MG/1
75 TABLET ORAL DAILY
OUTPATIENT
Start: 2025-06-12 | End: 2025-07-01

## 2025-06-11 RX ORDER — SODIUM CHLORIDE 0.9 % (FLUSH) 0.9 %
10 SYRINGE (ML) INJECTION
OUTPATIENT
Start: 2025-06-11

## 2025-06-11 RX ORDER — NAPROXEN SODIUM 220 MG/1
81 TABLET, FILM COATED ORAL DAILY
OUTPATIENT
Start: 2025-06-12

## 2025-06-11 RX ORDER — ONDANSETRON HYDROCHLORIDE 2 MG/ML
4 INJECTION, SOLUTION INTRAVENOUS EVERY 12 HOURS PRN
OUTPATIENT
Start: 2025-06-11

## 2025-06-11 RX ORDER — LEVETIRACETAM 250 MG/1
500 TABLET ORAL 2 TIMES DAILY
OUTPATIENT
Start: 2025-06-11

## 2025-06-11 RX ORDER — ATORVASTATIN CALCIUM 40 MG/1
80 TABLET, FILM COATED ORAL DAILY
OUTPATIENT
Start: 2025-06-12

## 2025-06-11 RX ORDER — FOLIC ACID 1 MG/1
1 TABLET ORAL DAILY
OUTPATIENT
Start: 2025-06-12

## 2025-06-11 RX ADMIN — Medication 100 MG: at 08:06

## 2025-06-11 RX ADMIN — LEVETIRACETAM 500 MG: 250 TABLET, FILM COATED ORAL at 08:06

## 2025-06-11 RX ADMIN — POTASSIUM CHLORIDE 20 MEQ: 1500 TABLET, EXTENDED RELEASE ORAL at 08:06

## 2025-06-11 RX ADMIN — FOLIC ACID 1 MG: 1 TABLET ORAL at 08:06

## 2025-06-11 RX ADMIN — CLOPIDOGREL 75 MG: 75 TABLET ORAL at 08:06

## 2025-06-11 RX ADMIN — SODIUM CHLORIDE: 9 INJECTION, SOLUTION INTRAVENOUS at 07:06

## 2025-06-11 RX ADMIN — ASPIRIN 81 MG: 81 TABLET, CHEWABLE ORAL at 08:06

## 2025-06-11 RX ADMIN — ATORVASTATIN CALCIUM 80 MG: 40 TABLET, FILM COATED ORAL at 08:06

## 2025-06-11 RX ADMIN — THERA TABS 1 TABLET: TAB at 08:06

## 2025-06-11 NOTE — NURSING
Chart check complete. Ivs removed, catheter intact, telemetry monitor removed and returned to cabinet.

## 2025-06-11 NOTE — TELEPHONE ENCOUNTER
Please dig into this a little deeper. It appears the patient was here due to having a stroke and was discharged from here to a rehab facility. So he is inpatient somewhere else.      Copied from CRM #[id number]. Topic: Appointments - Appointment Access  >> Jun 11, 2025 12:23 PM Curt wrote:  Type:  Sooner Apoointment Request    Caller is requesting a sooner appointment.  Caller declined first available appointment listed below.  Caller will not accept being placed on the waitlist and is requesting a message be sent to doctor.  Name of Caller:[facility] for patient  When is the first available appointment?unable to schedule  Symptoms:hospital f/u   Would the patient rather a call back or a response via [facility]? Please call to schedule in 6 weeks  Best Call Back Number:[phone] or [phone]  Additional Information:

## 2025-06-11 NOTE — NURSING
Chart check completed.     Neuro q4. Patient able to move RLE more today than yesterday. Patient able to move shoulder on RA but nothing lower. Patient speaking more clearly than night before.  Bath and linen changed in AM.    No acute events overnight. Pt VSS. Urinal within reach. Purposeful rounding complete. All questions answered. Call light within reach. Bed in locked and low position. Bed alarm on.

## 2025-06-11 NOTE — NURSING
Report given to [facility]. [phone]. Patient is getting a cardiac monitor to leave with for 30 days monitored. Follow up with DR. Phillip.   Being transferred by facility via wheelchair. Transferred on Room air, IV taken out for transfer.

## 2025-06-11 NOTE — NURSING
Chart check reviewed   Latest Reference Range & Units 06/11/25 04:52   Potassium 3.5 - 5.1 mmol/L 3.4 (L)   (L): Data is abnormally low

## 2025-06-11 NOTE — DISCHARGE SUMMARY
[facility] Medicine  Discharge Summary      Patient Name: Drew Kaur  MRN: [id number]  [place]: [id number]  Patient Class: IP- Inpatient  Admission Date: 6/9/2025  Hospital Length of Stay: 2 days  Discharge Date and Time: 6/11/2025 12:46 PM  Attending Physician: No att. providers found   Discharging Provider: Myrna Camilo NP  Primary Care Provider: Ishmael Izquierdo MD    Primary Care Team: Networked reference to record PCT     HPI:   Drew Kaur is a 60-year-old male with PMHx significant for HTN, seizures and GERD.  He presented to the ER with right-sided deficits and expressive aphasia.  Patient went to bed around 9:00 p.m. which was his last known well time.  When he awoke around 2:00 a.m. he was unable to move his right side.  He woke up his wife and 911 was called.  Code stroke was activated in the ED. CT of the head was obtained noting chronic changes but no evidence for acute intracranial process.  CT of the head neck revealed focal nonocclusive intraluminal thrombus of the basal artery, chronic versus acute occlusion of the V4 segment of right vertebral artery.  Recommendations were made for loading dose of Plavix 300 mg and loading dose of aspirin 325 mg.  He was noted to fail his Singh swallow eval and NG tube was placed.  Patient will be admitted to the services of Hospital Medicine for further evaluation and treatment.    * No surgery found *      Hospital Course:   Drew Kaur is a 60 year old male w/ PMH mitral valve and aortic valve insufficiency, CVA, HTN, seizure disorder, and ETOH abuse. He was admitted with acute stroke with right-sided hemiplegia and facial droop.  Code stroke was initiated.  He was not a candidate for tPA.  He was placed on stroke pathway and administered loading dose of aspirin and Plavix as well as initiated on high-intensity atorvastatin.  CT head, CTA of the head and neck, MRI of the brain, and echocardiogram were all obtained.   PT/OT/ST were consulted.  Patient was noted to have dysphagia and placed on pureed diet with honey thickened liquids.  Modified barium swallow study was obtained.  Overnight, patient did have some improvement in his weakness and swallowing abilities.  He was recommended for inpatient rehab.  Case management was consulted for placement. Patient assessed on day of discharge and deemed stable to discharge. Patient and spouse agreeable to plan.      Goals of Care Treatment Preferences:  Code Status: Full Code      SDOH Screening:  The patient was screened for utility difficulties, food insecurity, transport difficulties, housing insecurity, and interpersonal safety and there were no concerns identified this admission.     Consults:   Consults (From admission, onward)          Status Ordering Provider     Inpatient consult to Neurology Services (Vascular Neurology)  Once        Provider:  (Not yet assigned)    Completed LIAN MONROE     Inpatient consult to Registered Dietitian/Nutritionist  Once        Provider:  (Not yet assigned)    Completed LIAN MONROE     IP consult to case management/social work  Once        Provider:  (Not yet assigned)    Completed LIAN MONROE     Consult to Telemedicine - Acute Stroke  Once        Provider:  Mikey Elizabeth MD    Completed ADELINA BURK            Assessment & Plan  Stroke  Acute problem  Antithrombotics for secondary stroke prevention: Antiplatelets: Aspirin: 81 mg daily  Aspirin: 325 mg daily  Clopidogrel: 300 mg loading dose x 1, now  Clopidogrel: 75 mg daily    Statins for secondary stroke prevention and hyperlipidemia, if present:   Statins: Atorvastatin- 80 mg daily    Aggressive risk factor modification: HTN     Rehab efforts: The patient has been evaluated by a stroke team provider and the therapy needs have been fully considered based off the presenting complaints and exam findings. The following therapy evaluations are needed: PT evaluate  and treat, OT evaluate and treat, SLP evaluate and treat    Diagnostics ordered/pending: CT scan of head without contrast to asses brain parenchyma, CTA Head to assess vasculature , CTA Neck/Arch to assess vasculature, HgbA1C to assess blood glucose levels, Lipid Profile to assess cholesterol levels, MRI head without contrast to assess brain parenchyma, TTE to assess cardiac function/status , TSH to assess thyroid function    VTE prophylaxis: Heparin 5000 units SQ every 8 hours    BP parameters: Infarct: No intervention, SBP <220    Consult TeleVasc Neurology  Plavix and ASA DAPT x21 days followed by ASA monotherapy  Cardiac event monitor set up at discharge    Hypertension  Patient's blood pressure range in the last 24 hours was: BP  Min: 140/92  Max: 159/95.The patient's inpatient anti-hypertensive regimen is listed below:  Current Antihypertensives  , Daily, Oral    Plan  - BP is controlled, no changes needed to their regimen  - Holding BP meds in setting of acute neurological deficit, see plan for CVA   -24-48 hour permissive HTN  Seizure disorder  Continue Keppra      Alcohol abuse  Patient admits to drinking 5-6 cocktails daily   Place on CIWA protocol and monitor for DTs   P.r.n. 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    Specialty: Family Medicine  Why: call to make an appointment to be seen in 1 week after discharged from Rehab  Contact  information:  [address]  [facility] [place] [address]  [phone]               Tom Ascencio MD. Schedule an appointment as soon as possible for a visit in 4 week(s).    Specialty: Neurology  Why: Call to make an appointment to be seen in 4 weeks with neurology  Contact information:  [address]  [place] [address]  [phone]               Loki Euceda MD Follow up.    Specialties: Cardiology, General Surgery  Why: someone will call with follow up appointment with cardiology if you need to be seen for results from your event monitor; Make sure you find out if you will be seen in [facility] or [facility]  Contact information:  [address]  [facility] [place] [address] [phone]                           Patient Instructions:   No discharge procedures on file.    Significant Diagnostic Studies: Labs: BMP:   Recent Labs   Lab 06/10/25  0505 06/11/25 0452    90    139   K 3.6 3.4*    105   CO2 22* 22*   BUN 10 8   CREATININE 0.9 0.8   CALCIUM 8.7 8.7   MG 1.9  --    , CMP   Recent Labs   Lab 06/10/25  0505 06/11/25  0452    139   K 3.6 3.4*    105   CO2 22* 22*    90   BUN 10 8   CREATININE 0.9 0.8   CALCIUM 8.7 8.7   PROT 6.6 6.6   ALBUMIN 3.5 3.4*   BILITOT 0.9 0.8   ALKPHOS 145 137   AST 19 19   ALT 17 15   ANIONGAP 10 12   , CBC   Recent Labs   Lab 06/10/25  0505 06/11/25 0452   WBC 6.65 5.20   HGB 14.0 13.7*   HCT 40.2 39.9*    207   , Lipid Panel   Lab Results   Component Value Date    CHOL 163 06/09/2025    HDL 90 (H) 06/09/2025    LDLCALC 40.6 (L) 06/09/2025    TRIG 162 (H) 06/09/2025    CHOLHDL 55.2 (H) 06/09/2025   , and All labs within the past 24 hours have been reviewed  Radiology: MRI: Narrative & Impression  EXAMINATION:  MRI BRAIN WITHOUT CONTRAST     CLINICAL HISTORY:  Stroke, follow up;.     TECHNIQUE:  Multiplanar multisequence MR imaging of the brain was performed without the administration of intravenous contrast.      COMPARISON:  CT head 06/09/2025     FINDINGS:  Abnormal region of diffusion restriction and corresponding mild increased T2/FLAIR signal within the left paramedian howard concerning for acute infarction.  Small additional adjacent focus of acute infarction involving the right paramedian howard.     Ventricles and sulci are normal in size for age without evidence of hydrocephalus. No extra-axial blood or fluid collections.     Extensive confluent and scattered foci of T2/FLAIR hyperintense signal again seen within the bilateral periventricular, deep and subcortical white matter, again nonspecific but most commonly reflecting chronic small vessel ischemic changes.  Superimposed small foci of encephalomalacia in the right centrum semiovale and left thalamus compatible with chronic lacunar infarcts.  Several punctate foci of susceptibility within the bilateral thalami consistent with chronic microhemorrhages.  No evidence of acute intracranial hemorrhage.  No significant intracranial mass effect or midline shift.     Normal vascular flow voids are present.     Bone marrow signal intensity is normal. Scattered mild mucosal membrane thickening in the paranasal sinuses.  Superimposed small polypoid T2 hyperintensity along the medial right maxillary antrum suggestive of a polyp or retention cyst.  The visualized portions of the mastoids are unremarkable.     Orbits are unremarkable.     Impression:     1. Acute infarcts involving the left greater than right paramedian howard.  No associated significant mass effect or acute intracranial hemorrhage.  2. Mild generalized cerebral volume loss and extensive chronic small vessel ischemic changes, including chronic lacunar infarcts in the right centrum semiovale and left thalamus, and bilateral thalamic chronic microhemorrhages.  This report was flagged in Epic as abnormal.     The significant finding above was relayed by myself  via epic secure chat to Lashon Adam NP on  06/09/2025 at 12:32.  Findings were acknowledged and understood.        Electronically signed by:Jc Cunha  Date:                                            06/09/2025  Time:                                           12:36  CT scan: Narrative & Impression  EXAMINATION:  CT HEAD FOR CODE STROKE     CLINICAL HISTORY:  Neuro deficit, acute, stroke suspected;     TECHNIQUE:  Low dose axial images were obtained through the head.  Coronal and sagittal reformations were also performed. Contrast was not administered.     COMPARISON:  CT examination of the brain August 19, 2024     FINDINGS:  There is artifact present, this diminishes the sensitivity of the examination.  When accounting for artifact the appearance of the ventricular system, sulcal pattern and parenchymal attenuation character is consistent with chronic change.  Involutional change and chronic appearing white matter change noted.     There is no evidence for intracranial mass, mass effect or midline shift and there is no evidence for acute intracranial hemorrhage.  Appropriate CSF spaces are seen at the skull base.     The visualized mastoid air cells appear well aerated.  There is a suspected small mucous retention cyst of the right maxillary antrum.  Mild mucosal thickening is noted mild opacity at the mid right ethmoid level.  The visualized orbits appear intact.  The osseous structures demonstrate chronic change.     Impression:     Chronic changes are noted, there is no evidence for acute intracranial process.     Paranasal sinus findings as above.        Electronically signed by:Immanuel Werner  Date:                                            06/09/2025  Time:                                           04:48    Narrative & Impression  EXAMINATION:  CTA head and neck June 9, 2025     CLINICAL HISTORY:  Neuro deficit, acute stroke suspected     TECHNIQUE:  CT a examination of the head and neck was performed after the administration of 100 mL Omnipaque  350 intravenous contrast.  Axial imaging obtained from above the level the vertex to the level of the superior aspect of the thoracic aortic arch.  Axial MIP reconstruction imaging is submitted.  Sagittal reconstruction imaging is submitted, coronal reconstruction imaging was performed by the radiologist on the radiology workstation at the time of dictation.     COMPARISON:  CT examination of the brain without contrast June 9, 2025     FINDINGS:  There is artifact including mild motion artifact present, this diminishes the sensitivity of the examination.     The visualized superior aspect of the thoracic aortic arch demonstrates appropriate opacification.     The patient appears left vertebral dominant.  There is tortuosity of the proximal segment of the left vertebral artery.  The left vertebral artery demonstrates appropriate opacification throughout its course, atherosclerotic change noted, there is no evidence for high-grade stenosis or occlusion or dissection.     The right vertebral artery appears small in caliber throughout its course, given the left vertebral dominance.  The distal segment of the right vertebral artery, the V4 segment is not well delineated.  Not opacified.  On close evaluation there appears to be a thin structure that would be consistent with a thin small the 4 segment of the distal right vertebral artery, without appreciable opacification and therefore consistent with occlusion, given the small size of the vessel this may be chronic, however acute versus chronic occlusion would be in the differential, clinical correlation is needed.     The basilar artery appears tortuous.  There is a small focal filling defect within the basilar artery at the level of the mid to upper basilar artery, as best seen on axial series 3, image 200 consistent with a small nonocclusive intraluminal thrombus.     The common, external and internal carotid arteries bilaterally demonstrate appropriate opacification.   The internal carotid arteries demonstrate opacification to the level of the skull base and [id number].  Mild atherosclerotic change noted, there is no evidence for high-grade stenosis or occlusion or dissection.     The anterior cerebral artery and middle cerebral artery bilaterally demonstrate appropriate opacification.  There is a posterior communicating artery on the left.  The posterior cerebral artery bilaterally demonstrates appropriate opacification.  There is no evidence for high-grade stenosis or occlusion, no evidence for intracranial aneurysm or AVM.  The intracranial venous sinuses appear appropriate.     The intracranial appearance is stable when compared to the noncontrast head CT of the same evening.  The visualized orbits appear intact.  Paranasal sinus findings are noted on the head CT.  The mastoid air cells appear well aerated.     There is no evidence for dominant neck mass or cystic collection.  The airway appears patent.  There is no abnormal thickening of the epiglottis.  The osseous structures appear intact.  The lung apices demonstrate appearance of may relate to components of diminished depth of inspiration and atelectasis and chronic change.     Impression:     Focal nonocclusive intraluminal thrombus of the basilar artery, as discussed above.     The patient is left vertebral dominant, the right vertebral artery is diminutive along its course, the V4 segment of the distal right vertebral artery demonstrates lack of opacification, consistent with occlusion, this may relate to chronic occlusion however acute versus chronic occlusion would be in the differential.     The left vertebral artery demonstrates no evidence for high-grade stenosis or occlusion.     The carotid arterial vasculature bilaterally demonstrates no evidence for high-grade stenosis or occlusion.     The remainder of the major intracranial arterial vascular structures demonstrate no additional evidence for high-grade  stenosis or occlusion, and there is no evidence for intracranial aneurysm or AVM.     Additional findings as above.     This report was flagged in Epic as abnormal.     The findings were discussed with Dr. Burnette in the ER prior to dictation.        Electronically signed by:Immanuel Werner  Date:                                            06/09/2025  Time:                                           05:16  Cardiac Graphics: Echocardiogram: Transthoracic echo (TTE) complete (Cupid Only):   Results for orders placed or performed during the hospital encounter of 06/09/25   Echo Saline Bubble? Yes   Result Value Ref Range    BSA 1.85 m2    Del Rosario's Biplane MOD Ejection Fraction 64 %    A2C EF 62 %    A4C EF 64 %    LVOT stroke volume 44.6 cm3    LVIDd 5.0 3.5 - 6.0 cm    LV Systolic Volume 59 mL    LV Systolic Volume Index 31.7 mL/m2    LVIDs 3.7 2.1 - 4.0 cm    LV ESV A2C 21.59 mL    LV Diastolic Volume 117 mL    LV ESV A4C 21.84 mL    LV Diastolic Volume Index 62.90 mL/m2    LV EDV A2C 59.561318717193195 mL    LV EDV A4C 70.01 mL    Left Ventricular End Systolic Volume by Teichholz Method 59.29 mL    Left Ventricular End Diastolic Volume by Teichholz Method 116.97 mL    IVS 0.9 0.6 - 1.1 cm    LVOT diameter 2.0 cm    LVOT area 3.1 cm2    FS 26.0 (A) 28 - 44 %    Left Ventricle Relative Wall Thickness 0.36 cm    PW 0.9 0.6 - 1.1 cm    LV mass 158.2 g    LV Mass Index 85.1 g/m2    MV Peak E José 0.58 m/s    TDI LATERAL 0.08 m/s    TDI SEPTAL 0.05 m/s    E/E' ratio 9 m/s    MV Peak A José 0.75 m/s    TR Max José 2.1 m/s    E/A ratio 0.77     E wave deceleration time 239 msec    LV SEPTAL E/E' RATIO 11.6 m/s    LV LATERAL E/E' RATIO 7.3 m/s    LVOT peak josé 0.7 m/s    Left Ventricular Outflow Tract Mean Velocity 0.53 cm/s    Left Ventricular Outflow Tract Mean Gradient 1.23 mmHg    RV- hernandez basal diam 2.6 cm    RV-hernandez mid d 1.9 cm    RV Basal Diameter 5.97 cm    RV-hernandez length 6.0 cm    RV mid diameter 1.89 cm    RV/LV Ratio  0.52 cm    LA size 3.2 cm    Left Atrium Minor Axis 2.6 cm    Left Atrium Major Axis 3.0 cm    LA Vol (MOD) 22 mL    KRYSTA (MOD) 12 mL/m2    RA Major Axis 3.55 cm    RA Width 2.41 cm    AV regurgitation pressure 1/2 time 1,635 ms    AR Max Rell 2.70 m/s    AV mean gradient 3 mmHg    AV peak gradient 7 mmHg    Ao peak rell 1.3 m/s    Ao VTI 25.2 cm    LVOT peak VTI 14.2 cm    AV valve area 1.8 cm²    AV Velocity Ratio 0.54     AV index (prosthetic) 0.56     KAUSHIK by Velocity Ratio 1.7 cm²    MV mean gradient 1 mmHg    MV peak gradient 3 mmHg    MV stenosis pressure 1/2 time 69.28 ms    MV valve area p 1/2 method 3.18 cm2    MV valve area by continuity eq 2.11 cm2    MV VTI 21.1 cm    Triscuspid Valve Regurgitation Peak Gradient 17 mmHg    PV PEAK VELOCITY 0.77 m/s    PV peak gradient 2 mmHg    Pulmonary Valve Mean Velocity 0.52 m/s    Ao root annulus 3.3 cm    IVC diameter 1.23 cm    Mean e' 0.07 m/s    ZLVIDS 1.17     ZLVIDD -0.34     LA area A4C 10.86 cm2    LA area A2C 10.31 cm2    AORTIC VALVE CUSP SEPERATION 1.03 cm    EF 56 %    GLS 19.4 %    TAPSE 1.7 cm    TV resting pulmonary artery pressure 21 mmHg    RV TB RVSP 5 mmHg    Est. RA pres 3 mmHg    Narrative      Left Ventricle: The left ventricle is normal in size. Normal wall   thickness. There is normal systolic function with a visually estimated   ejection fraction of 55 - 60%. Ejection fraction is approximately 56%.    GLS is normal, measuring -19.4% There is normal diastolic function.    Right Ventricle: The right ventricle is normal in size Systolic   function is normal. TAPSE is 1.7 cm.    Left Atrium: The left atrium is normal in size Agitated saline study of   the atrial septum is negative after vasalva maneuver, suggesting absence   of intracardiac shunt at the atrial level.    Right Atrium: The right atrium is normal in size.    Aortic Valve: Aortic valve peak velocity is 1.3 m/s. Mean gradient is 3   mmHg.    Mitral Valve: There is mild regurgitation.     Tricuspid Valve: There is mild regurgitation.    Pulmonic Valve: There is mild regurgitation.    Aorta: The ascending aorta is normal in size.    Pulmonary Artery: The estimated pulmonary artery systolic pressure is   21 mmHg.    IVC/SVC: Normal venous pressure at 3 mmHg.    Pericardium: There is no pericardial effusion.         Pending Diagnostic Studies:       None           Medications:  Reconciled Home Medications:      Medication List        ASK your doctor about these medications      amLODIPine 2.5 MG tablet  Commonly known as: NORVASC  Take 5 mg by mouth once daily.  Ask about: Which instructions should I use?     aspirin 81 MG EC tablet  Commonly known as: ECOTRIN  Take 81 mg by mouth once daily.     chlordiazepoxide 25 MG Cap  Commonly known as: LIBRIUM  Take 1 capsule (25 mg total) by mouth 2 (two) times a day.     levETIRAcetam 500 MG Tab  Commonly known as: KEPPRA  Take 500 mg by mouth 2 (two) times daily. Take one tablet in am and 2 tablets at night     pantoprazole 40 MG tablet  Commonly known as: PROTONIX     potassium chloride 10 MEQ Cpsr  Commonly known as: MICRO-K  Take 10 mEq by mouth once daily.  Ask about: Which instructions should I use?              Indwelling Lines/Drains at time of discharge:   Lines/Drains/Airways       None                   Time spent on the discharge of patient: 35 minutes         Myrna Camilo NP  Department of Hospital Medicine  [facility]

## 2025-06-11 NOTE — NURSING
Transportation here to  pt, pt discharging to [facility]. Discharge papers explained and given to pt.

## 2025-06-11 NOTE — PT/OT/SLP PROGRESS
Physical Therapy Treatment    Patient Name:  Drew Kaur   MRN:  [id number]    Recommendations:     Discharge Recommendations: High Intensity Therapy (Inpatient Rehab)  Discharge Equipment Recommendations:  (TBD)  Barriers to discharge: Ongoing medical treatment, increased level of assistance    Assessment:     Drew Kaur is a 60 y.o. male admitted with a medical diagnosis of Stroke.  He presents with the following impairments/functional limitations: weakness, impaired functional mobility, gait instability, impaired balance, decreased coordination, decreased upper extremity function, decreased lower extremity function, abnormal tone.    Rehab Prognosis: Good; patient would benefit from acute skilled PT services to address these deficits and reach maximum level of function.    Recent Surgery: * No surgery found *      Plan:     During this hospitalization, patient to be seen 5 x/week to address the identified rehab impairments via gait training, therapeutic activities, therapeutic exercises and progress toward the following goals:    Plan of Care Expires:  06/23/25    Subjective     Chief Complaint: The patient reports he is feeling a little stronger today.  Patient/Family Comments/goals: The patient would like to return to independent mobility.  Pain/Comfort:  Pain Rating 1: 0/10      Objective:     Communicated with nurse prior to session.  Patient found up in chair with peripheral IV, telemetry upon PT entry to room.     General Precautions: Standard, fall  Orthopedic Precautions: N/A  Braces: UE Sling  Respiratory Status: Room air     Functional Mobility:  Transfers:     Sit to Stand:  moderate assistance with hemiwalker  Gait: Ambulates 15 feet with a hemiwalker in his left hand and mod assist.  He requires mod assist due to losses of balance with max verbal cues for step sequencing, foot placement, and weight shifting.    Treatment & Education:  The patient received sit to stand transfer training  from the chair to the the hemiwalker on his left side with mod assist to help lift him up and for cues on hand placement and weight shifting.  He received gait training with the hemiwalker in his left hand and mod assist for stability with max verbal cues for sequencing, foot placement, walker placement, and weight shifting.  He performed the following active assisted exercises x 20 reps each: ankle pumps, long arc quads, hip flexion, and hip abduction.    Proximal lower extremity strength seems to be improving but he still has 0/5 strength in the ankle.  He demonstrates improved stability with the hemiwalker.    Patient left up in chair with all lines intact, call button in reach, nurse notified, and wife present..    GOALS:   Multidisciplinary Problems       Physical Therapy Goals       Not on file              Multidisciplinary Problems (Resolved)          Problem: Physical Therapy    Goal Priority Disciplines Outcome Interventions   Physical Therapy Goal   (Resolved)     PT, PT/OT Met    Description: Goals    Patient to increase right lower extremity strength by 1/2 muscle grade.  Patient to roll left and right with min assistance.  Patient to transfer supine <> sit with min assistance.  Patient to transfer bed <> chair via stand-pivot with mod assistance.  Patient to ambulate with most appropriate AD  and mod assistance > 15 feet.                         Time Tracking:     PT Received On: 06/11/25  PT Start Time: 0930     PT Stop Time: 1000  PT Total Time (min): 30 min     Billable Minutes: Gait Training 15 and Therapeutic Exercise 15    Treatment Type: Treatment  PT/PTA: PT     Number of PTA visits since last PT visit: 0     06/11/2025

## 2025-06-11 NOTE — DISCHARGE INSTRUCTIONS
[facility]    FACILITY TRANSFER ORDERS      Patient Name: Drew Kaur  YOB: 1965    PCP: Ishmael Izquierdo MD   PCP Address: [address] [place] [address] / [facility] [place] [address]  PCP Phone Number: [phone]  PCP Fax: [phone]    Encounter Date: 06/11/2025    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       Allergies:Review of patient's allergies indicates:  No Known Allergies    Diet: pureed diet honey thick    Activities: Activity as tolerated    Goals of Care Treatment Preferences:  Code Status: Full Code      Nursing:  Per policy     Labs: Per policy       CONSULTS:    Physical Therapy to evaluate and treat. , Occupational Therapy to evaluate and treat., and Speech Therapy to evaluate and treat for Language and Swallowing.    MISCELLANEOUS CARE:  Seizure precautions    WOUND CARE ORDERS  None    Medications: Review discharge medications with patient and family and provide education.         Medication List        ASK your doctor about these medications      amLODIPine 2.5 MG tablet  Commonly known as: NORVASC  Take 5 mg by mouth once daily.  Ask about: Which instructions should I use?     aspirin 81 MG EC tablet  Commonly known as: ECOTRIN  Take 81 mg by mouth once daily.     chlordiazepoxide 25 MG Cap  Commonly known as: LIBRIUM  Take 1 capsule (25 mg total) by mouth 2 (two) times a day.     levETIRAcetam 500 MG Tab  Commonly known as: KEPPRA  Take 500 mg by mouth 2 (two) times daily. Take one tablet in am and 2 tablets at night     pantoprazole 40 MG tablet  Commonly known as: PROTONIX     potassium chloride 10 MEQ Cpsr  Commonly known as: MICRO-K  Take 10 mEq by mouth once  daily.  Ask about: Which instructions should I use?                Immunizations Administered as of 6/11/2025       Name Date Dose VIS Date Route Exp Date    COVID-19, MRNA, LN-S, PF (Moderna) 4/20/2021  1:51 PM 0.5 mL 12/19/2020 Intramuscular 4/20/2021    Site: Right deltoid     Given By: Phyllis Ayala     : [employer]     Lot: [id number]     COVID-19, MRNA, LN-S, PF (Moderna) 3/23/2021  4:07 PM 0.5 mL 12/19/2020 Intramuscular 9/3/2021    Site: Right deltoid     Given By: Kylee Michelle RN     : Modern[employer]     Lot: [id number]             This patient has had both covid vaccinations    Some patients may experience side effects after vaccination.  These may include fever, headache, muscle or joint aches.  Most symptoms resolve with 24-48 hours and do not require urgent medical evaluation unless they persist for more than 72 hours or symptoms are concerning for an unrelated medical condition.          _________________________________  Myrna Camilo NP  06/11/2025

## 2025-06-11 NOTE — ASSESSMENT & PLAN NOTE
.  
Acute problem  Antithrombotics for secondary stroke prevention: Antiplatelets: Aspirin: 81 mg daily  Aspirin: 325 mg daily  Clopidogrel: 300 mg loading dose x 1, now  Clopidogrel: 75 mg daily    Statins for secondary stroke prevention and hyperlipidemia, if present:   Statins: Atorvastatin- 80 mg daily    Aggressive risk factor modification: HTN     Rehab efforts: The patient has been evaluated by a stroke team provider and the therapy needs have been fully considered based off the presenting complaints and exam findings. The following therapy evaluations are needed: PT evaluate and treat, OT evaluate and treat, SLP evaluate and treat    Diagnostics ordered/pending: CT scan of head without contrast to asses brain parenchyma, CTA Head to assess vasculature , CTA Neck/Arch to assess vasculature, HgbA1C to assess blood glucose levels, Lipid Profile to assess cholesterol levels, MRI head without contrast to assess brain parenchyma, TTE to assess cardiac function/status , TSH to assess thyroid function    VTE prophylaxis: Heparin 5000 units SQ every 8 hours    BP parameters: Infarct: No intervention, SBP <220    Consult TeleVasc Neurology  
Acute problem  Antithrombotics for secondary stroke prevention: Antiplatelets: Aspirin: 81 mg daily  Aspirin: 325 mg daily  Clopidogrel: 300 mg loading dose x 1, now  Clopidogrel: 75 mg daily    Statins for secondary stroke prevention and hyperlipidemia, if present:   Statins: Atorvastatin- 80 mg daily    Aggressive risk factor modification: HTN     Rehab efforts: The patient has been evaluated by a stroke team provider and the therapy needs have been fully considered based off the presenting complaints and exam findings. The following therapy evaluations are needed: PT evaluate and treat, OT evaluate and treat, SLP evaluate and treat    Diagnostics ordered/pending: CT scan of head without contrast to asses brain parenchyma, CTA Head to assess vasculature , CTA Neck/Arch to assess vasculature, HgbA1C to assess blood glucose levels, Lipid Profile to assess cholesterol levels, MRI head without contrast to assess brain parenchyma, TTE to assess cardiac function/status , TSH to assess thyroid function    VTE prophylaxis: Heparin 5000 units SQ every 8 hours    BP parameters: Infarct: No intervention, SBP <220    Consult TeleVasc Neurology  
Acute problem  Antithrombotics for secondary stroke prevention: Antiplatelets: Aspirin: 81 mg daily  Aspirin: 325 mg daily  Clopidogrel: 300 mg loading dose x 1, now  Clopidogrel: 75 mg daily    Statins for secondary stroke prevention and hyperlipidemia, if present:   Statins: Atorvastatin- 80 mg daily    Aggressive risk factor modification: HTN     Rehab efforts: The patient has been evaluated by a stroke team provider and the therapy needs have been fully considered based off the presenting complaints and exam findings. The following therapy evaluations are needed: PT evaluate and treat, OT evaluate and treat, SLP evaluate and treat    Diagnostics ordered/pending: CT scan of head without contrast to asses brain parenchyma, CTA Head to assess vasculature , CTA Neck/Arch to assess vasculature, HgbA1C to assess blood glucose levels, Lipid Profile to assess cholesterol levels, MRI head without contrast to assess brain parenchyma, TTE to assess cardiac function/status , TSH to assess thyroid function    VTE prophylaxis: Heparin 5000 units SQ every 8 hours    BP parameters: Infarct: No intervention, SBP <220    Consult Tele[facility]asc Neurology  Plavix and ASA DAPT x21 days followed by ASA monotherapy  Cardiac event monitor set up at discharge    
Continue Keppra      
Patient admits to drinking 5-6 cocktails daily   Place on CIWA protocol and monitor for DTs   P.r.n. IV Ativan  Multivitamins, thiamine, folic acid    
Patient admits to drinking 5-6 cocktails daily   Place on CIWA protocol and monitor for DTs   P.r.n. IV Ativan  Multivitamins, thiamine, folic acid    
Patient's blood pressure range in the last 24 hours was: BP  Min: 108/77  Max: 154/109.The patient's inpatient anti-hypertensive regimen is listed below:  Current Antihypertensives  , Daily, Oral  labetalol 20 mg/4 mL (5 mg/mL) IV syring, Every 15 min PRN, Intravenous    Plan  - BP is controlled, no changes needed to their regimen  - Holding BP meds in setting of acute neurological deficit, see plan for CVA  
Patient's blood pressure range in the last 24 hours was: BP  Min: 130/81  Max: 140/78.The patient's inpatient anti-hypertensive regimen is listed below:  Current Antihypertensives  , Daily, Oral  labetalol 20 mg/4 mL (5 mg/mL) IV syring, Every 15 min PRN, Intravenous    Plan  - BP is controlled, no changes needed to their regimen  - Holding BP meds in setting of acute neurological deficit, see plan for CVA  
Patient's blood pressure range in the last 24 hours was: BP  Min: 140/92  Max: 159/95.The patient's inpatient anti-hypertensive regimen is listed below:  Current Antihypertensives  , Daily, Oral    Plan  - BP is controlled, no changes needed to their regimen  - Holding BP meds in setting of acute neurological deficit, see plan for CVA   -24-48 hour permissive HTN  
See above for plan        
show

## 2025-06-12 NOTE — PLAN OF CARE
[facility]  Discharge Final Note    Primary Care Provider: Ishmael Izquierdo MD    Expected Discharge Date: 6/11/2025    Patient approved for inpatient rehab at [facility] in [place]. Family at bedside aware & will follow him there. Transportation set up for 12:30pm today. His wife has clothes & shoes for him in her car to bring to rehab. Denies any other needs at this time.       Final Discharge Note (most recent)       Final Note - 06/11/25 1145          Final Note    Assessment Type Final Discharge Note     Anticipated Discharge Disposition Rehab Facility     What phone number can be called within the next 1-3 days to see how you are doing after discharge? [phone]     Hospital Resources/Appts/Education Provided Appointments scheduled and added to AVS;Post-Acute resouces added to AVS        Post-Acute Status    Post-Acute Authorization Placement     Post-Acute Placement Status Set-up Complete/Auth obtained     Discharge Delays Ambulance Transport/Facility Transport                     Important Message from Medicare             Contact Info       Ishmael Izquierdo MD   Specialty: Family Medicine   Relationship: PCP - General    [address] [address]   Phone: [phone]       Next Steps: Follow up in 1 week(s)    Instructions: call to make an appointment to be seen in 1 week after discharged from Rehab    Tom Ascencio MD   Specialty: Neurology    [address]  [place] [address]   Phone: [phone]       Next Steps: Schedule an appointment as soon as possible for a visit in 4 week(s)    Instructions: Call to make an appointment to be seen in 4 weeks with neurology    Loki Euceda MD   Specialty: Cardiology, General Surgery    [address] [facility] [place] [address]   Phone: [phone]       Next Steps: Follow up    Instructions: someone will call with follow up appointment with cardiology if you need to be seen for results from your event monitor; Make sure you find  out if you will be seen in [facility] or [facility]   Specialty: Physical Therapy, Occupational Therapy    [address] [address] [address]   Phone: [phone]       Next Steps: Follow up    Instructions: will provide inpatient rehab

## 2025-06-25 ENCOUNTER — HOSPITAL ENCOUNTER (OUTPATIENT)
Dept: RADIOLOGY | Facility: HOSPITAL | Age: 60
Discharge: HOME | End: 2025-06-25
Attending: PHYSICAL MEDICINE & REHABILITATION
Payer: COMMERCIAL

## 2025-06-25 PROCEDURE — 71045 X-RAY EXAM CHEST 1 VIEW: CPT | Mod: 26,,, | Performed by: RADIOLOGY

## 2025-07-07 DIAGNOSIS — Z78.9 IMPAIRED MOBILITY AND ADLS: ICD-10-CM

## 2025-07-07 DIAGNOSIS — Z74.09 IMPAIRED MOBILITY AND ADLS: ICD-10-CM

## 2025-07-07 DIAGNOSIS — I63.9 CVA (CEREBRAL VASCULAR ACCIDENT): Primary | ICD-10-CM

## 2025-07-07 DIAGNOSIS — G40.909 SEIZURE DISORDER: ICD-10-CM

## 2025-07-09 ENCOUNTER — CLINICAL SUPPORT (OUTPATIENT)
Dept: REHABILITATION | Facility: HOSPITAL | Age: 60
End: 2025-07-09
Payer: COMMERCIAL

## 2025-07-09 DIAGNOSIS — R29.898 RIGHT ARM WEAKNESS: ICD-10-CM

## 2025-07-09 DIAGNOSIS — R29.898 RIGHT HAND WEAKNESS: ICD-10-CM

## 2025-07-09 DIAGNOSIS — Z86.73 H/O: CVA (CEREBROVASCULAR ACCIDENT): Primary | ICD-10-CM

## 2025-07-09 DIAGNOSIS — R26.89 DECREASED FUNCTIONAL MOBILITY: ICD-10-CM

## 2025-07-09 DIAGNOSIS — Z78.9 DECREASED ACTIVITIES OF DAILY LIVING (ADL): Primary | ICD-10-CM

## 2025-07-09 PROCEDURE — 97167 OT EVAL HIGH COMPLEX 60 MIN: CPT

## 2025-07-09 PROCEDURE — 92610 EVALUATE SWALLOWING FUNCTION: CPT

## 2025-07-09 PROCEDURE — 92522 EVALUATE SPEECH PRODUCTION: CPT

## 2025-07-09 NOTE — PROGRESS NOTES
"  Outpatient Rehab    Speech-Language Pathology Evaluation (only)    Patient Name: Drew Kaur  MRN: [id number]  YOB: 1965  Encounter Date: 7/9/2025    Therapy Diagnosis:   Encounter Diagnosis   Name Primary?    H/O: CVA (cerebrovascular accident) Yes     Physician: Mariela Newby MD    Physician Orders: Eval and Treat  Medical Diagnosis: CVA (cerebral vascular accident)  Impaired mobility and ADLs  Seizure disorder  Surgical Diagnosis: Not applicable for this Episode   Surgical Date: Not applicable for this Episode  Days Since Last Surgery: Not applicable for this Episode    Visit # / Visits Authorized: 1 / 1   Insurance Authorization Period: 7/7/2025 to 12/31/2025  Date of Evaluation: 7/9/2025      Time In: 1303   Time Out: 1348  Total Time (in minutes): 45   Total Billable Time (in minutes): 45      Subjective   History of Present Illness  Drew is a 60 y.o. male who reports to Speech-Language Pathology with a chief concern of Mild Slurred Speech.     The patient reports a medical diagnosis of CVA (cerebral vascular accident) (I63.9), Impaired mobility and ADLs (Z74.09, Z78.9), Seizure disorder (G40.909).    Diagnostic tests related to this condition: VFSS/MBSS.   VFSS/MBSS Details: 6/9/25 Results indicated "MBSS completed. Pt presents with moderate-severe dysphagia. Swallow efficiency is preserved; however, swallow safety is impaired. Consistency Recommendations: Pureed (IDDSI 4) and moderately thick/Honey thick liquids (IDDSI 3).  Aspiration Precautions: use good oral hygiene , sit upright for all PO intake, increase physical mobility as tolerated, feed only when wake and alert, small bites and sips, NO straws, Slow pace, Respites as needed, allow extra time for meals, and 1:1 feeding assist by nursing staff onlyMBSS completed."    Patient presents in clinic breathing with Room air.             Prior Level of Function: Independent  Current Level of Function: Mild decrease with " "articulatory endurance in conversation.    The patient presents to [facility] with complaints of mild dysarthria. He is previously known to evaluating speech therapist from acute care admission following a recent cerebrovascular accident (CVA) on 6/9/25. MRI of the brain on 6/9/25 revealed acute infarcts involving the left greater than right paramedian howard, without significant mass effect or acute intracranial hemorrhage. Additional findings included mild generalized cerebral volume loss and extensive chronic small vessel ischemic changes, including chronic lacunar infarcts in the right centrum semiovale and left thalamus, as well as bilateral thalamic chronic microhemorrhages. A Modified Barium Swallow Study (MBSS) performed on the same day indicated moderate to severe dysphagia, with recommendations for a pureed diet and moderately thick liquids. During his subsequent inpatient rehabilitation stay at [facility] in [place], speech therapy addressed both dysphagia and dysarthria. Noted speech impairments included slurring, particularly with /s/ blends, and use of compensatory over-articulation to enhance intelligibility. He was discharged from inpatient rehab on 7/5/25. Since returning home, the patient has progressed to and currently tolerates a regular diet with thin liquids without clinical signs or symptoms of aspiration. He is able to independently recall and implement swallowing precautions, including slowed feeding rate, small bites and sips, and use of double swallows. His most recent chest X-ray on 6/25/25 was negative, with no evidence of aspiration or pulmonary complications. At present, the patient's primary concern is intermittent slurred speech, which worsens with fatigue or prolonged verbal output. He continues to implement strategies such as slowing his rate of speech and over-articulating, though he notes these techniques can be effortful and make him "feel slow." He " is employed as a  for a [employer] company and plans to return to work when he feels ready. He reports no major concerns regarding speech interfering with his professional responsibilities.     Current Speech Symptoms  Patient reports: Speech Changes    Mild, intermittent slurring with connected speech.     Living Arrangements  Living Situation  Housing: Home independently  Living Arrangements: Spouse/significant other  Support Systems: Family members, Spouse/significant other         Employment  Employment Status: Employed full-time   Prior to work,  for [employer] Patient desires to return to work. Reported that current articulatory deficits will not interfere with his ability to work.      Past Medical History/Physical Systems Review:   Drew Kaur  has a past medical history of GERD (gastroesophageal reflux disease), Hypertension, and Seizures.    Drew Kaur  has a past surgical history that includes Eye surgery.    Drew has a current medication list which includes the following prescription(s): amlodipine, aspirin, chlordiazepoxide, levetiracetam, pantoprazole, and potassium chloride.    Review of patient's allergies indicates:  No Known Allergies     Objective   Oral Peripheral Exam  Dentition and Oral Hygiene  The patient's current dental condition: Missing lower dentition.   Oral hygiene is Good.       Buccal and Oral Mucosa Exam  Buccal sensation-CN V-is Decreased right. Buccal strength-CN VII-is Decreased right. Oral mucosa observations: Normal       Labial Exam  Labial Symmetry is Normal.  Range of motion-CN VII-is Decreased retraction, Decreased right upper, and Decreased right lower. Alternating labial protrusion/retraction is Impaired. Strength-CN VII-is Decreased right. Tone is Hypotonic. Coordination is Impaired.     Seal is Normal.       Lingual Exam  Range of motion-CN XII-is Normal. Strength-CN XII-is Normal. Symmetry-CN XII-is Normal.  Tone is Normal. Speed is Intact. Coordination is Impaired.            Velar and Uvular Exam  Velar elevation during phonation-CN X-is Left deviation of uvula (Right weakness). Sustained velar elevation is Intact. Alternating velar elevation/relaxation is Intact. Velum at rest is Intact. Velar tone is Normal.          Jaw Exam  Range of motion-CN V-is Normal.                 Auditory Comprehension  Responds to Name: Yes    Question Comprehension  Intact: Complex Yes/No Questions, Inferential Questions, and Environment-Based Questions       Conversation Comprehension  Intact: Simple Conversation and Complex Conversation                Verbal Expression  Verbal Expression Assessment Supportive Technology Usage: No       Verbal Initiation, Termination, and Diffuse Language Characteristics  Intact: Verbal Initiation and Verbal Termination             Narrative Speech  Intact: Conversational          Vocabulary and Syllable Structure     Syllable Structure: Variety produced                     Cognition  The patient's level of consciousness is Alert. Orientation level is Oriented x4.      Observed memory impairments include None/intact.   Observed attention impairments include None/intact.   Processing speed is Intact.      The patient demonstrates the ability to follow Multi-step commands.               Complex problem solving is Impaired.       Error recognition awareness is Anticipatory.    Safety judgment is Intact.              Cognitive-Communication  Verbal Reasoning  Intact: Verbal Reasoning           Social Communication  The patient's behavior and affect were observed and the patient was found to be Appropriate to situation, Calm, and Cooperative.       Joint attention was evaluated and the patient was Able to initiate and Able to respond.    Intact: Response to Humor/Figurative Language, Attempts to Repair Communication Breakdowns, Adapts Communication to Partner or Situation, Eye Contact, Prosody, Expression  of Communicative Intentions, Turn Taking, Topic Maintenance, and Perspective Taking                 Executive Function  Intact: Planning, Organization, and Mental Flexibility                  Communication Modes and Devices     Patient Expressive Communication Modes: Verbal  Verbalizations: Phrases/sentences    Current Receptive Communication Modes: Auditory  Caregiver/Familiar Person Required to Support Communication: No            Motor Speech  Oral Agility  Oral Diadochokinesis     Speech Rate  Speech Rate: Normal       Breath Support  Breath Support: Adequate for speech    Intelligibility and Articulation  Intelligibility  Intact: Phoneme  Impaired: Word-Level, Sentence-Level, and Conversation-Level    Speech intelligibility is mildly reduced in conversational and structured tasks, secondary to imprecise articulation and reduced coordination of oral musculature (lips and tongue). Listener familiarity and contextual support improve overall understanding.    Articulation  Speech Articulation: Breaks down in connected speech, Imprecise   Speech production is characterized by mild impairment, evidenced by reduced coordination of alternating oral movements involving the lips and tongue. Articulatory imprecision was noted for lingual and alveolar sounds, including /s/, /z/, //, and /kl/, during both word-level and connected speech tasks.    Motor Speech Impairment Type  Apraxia  Apraxia Type: None       Dysarthria  Dysarthria Type: Spastic  Dysarthria Severity: Mild          Non-Instrumental Swallow Exam  Trials presented by: Self    Drink Consistencies Presented: Thin (IDDSI 0)         Consistency Trials  Thin, IDDSI 0 - Presented 2 oz via Straw. Trialed small single sips.    Oral and Pharyngeal Phase  Oral phase: Normal     Pharyngeal phase: Normal       No overt s/s of laryngeal compromise noted with thin liquids by straw.        Time Entry(in minutes):  Speech Sound Production Eval Time Entry: 37  Clinical Swallow  Eval Time Entry: 8    Assessment & Plan   Assessment   Drew presents with symptoms that are Stable.  Will Comorbidities Impact Care: No       Diagnosis and Impressions: At stroke onset, the patient exhibited moderate to severe dysphagia; however, swallowing function has since resolved, and the patient is currently implementing safe swallowing strategies effectively.The patient demonstrates mild dysarthria characterized by reduced articulatory precision and mild speech slurring, consistent with involvement of the corticobulbar pathways secondary to an acute left paramedian pontine infarct. Speech intelligibility is mildly reduced but remains functional with familiar and unfamiliar listeners. Oral motor examination reveals mild impairment in coordination of alternating movements involving the lips and tongue, contributing to articulatory imprecision, particularly with lingual-alveolar sounds (/s/, /z/, /th/, /kl/). Respiratory and phonatory functions are grossly intact at this time. The presentation is consistent with a mild spastic dysarthria, reflecting upper motor neuron involvement at the brainstem level. The patient would benefit from speech therapy focused on improving articulatory precision, speech coordination, and strategies to enhance intelligibility in functional communication.         Evaluation/Treatment Response: Patient responded to treatment well     Patient Goal for Therapy (SLP): To improve endurance and speech intelligibility in conversation.  Prognosis: Good       Plan  From a speech language pathology perspective, the patient would benefit from: Skilled Rehab Services  Planned therapy interventions and modalities include: Speech/language therapy.              Visit Frequency: 1 times Per Week for 8 Weeks.       This plan was discussed with Patient and Family.   Discussion participants: Agreed Upon Plan of Care           The patient's spiritual, cultural, and educational needs were considered,  and the patient is agreeable to the plan of care and goals.     Goals:   Active       Long Term Goals       1. Patient will achieve functional speech intelligibility of 95% or higher during conversational speech with unfamiliar listeners in clinical and community settings.       Start:  07/09/25    Expected End:  09/03/25            2. Patient will demonstrate independent use of self-monitoring techniques and communication repair strategies to maintain effective communication.       Start:  07/09/25    Expected End:  09/03/25            3. Patient will continue to maintain safe swallowing status with no signs of aspiration or compensatory strategy breakdown.       Start:  07/09/25    Expected End:  09/03/25               Short Term Goals        1. Patient will improve articulatory precision in targeted speech sounds (/s/, /z/, /?/) and consonant blends to increase speech intelligibility to at least 90% during structured tasks, as measured by clinician ratings.       Start:  07/09/25    Expected End:  09/03/25            2. Patient will demonstrate improved coordination of alternating oral movements (e.g., AMRs and SMRs) with reduced effort and increased accuracy in 4 out of 5 trials.       Start:  07/09/25    Expected End:  09/03/25            3. Patient will implement compensatory communication strategies (e.g., slowing rate, over-articulation) to enhance intelligibility during spontaneous speech with minimal clinician cues.       Start:  07/09/25    Expected End:  09/03/25            4. Patient will maintain safe swallowing by consistently employing compensatory strategies during oral intake, as reported by patient and observed by clinician.       Start:  07/09/25    Expected End:  09/03/25                Leigha Kate CCC-SLP

## 2025-07-09 NOTE — PATIENT INSTRUCTIONS
"Opposition (Active)        Touch tip of thumb to nail tip of each finger in turn, making an "O" shape.  Repeat __30__ times. Do _2-3___ sessions per day.      ROM: Flexion - Wand (Supine)        Lie on back holding wand. Raise arms over head.   Repeat 10 times per set. Do 2 sets per session. Do 3 sessions per day.     [email or web address]         Opposition (Active)        Touch tip of thumb to nail tip of each finger in turn, making an "O" shape.  Repeat __30__ times. Do _2-3___ sessions per day.    Copyright © I. All rights reserved.     "

## 2025-07-10 PROBLEM — Z78.9 DECREASED ACTIVITIES OF DAILY LIVING (ADL): Status: ACTIVE | Noted: 2025-07-10

## 2025-07-10 PROBLEM — R29.898 RIGHT HAND WEAKNESS: Status: ACTIVE | Noted: 2025-07-10

## 2025-07-10 PROBLEM — R26.89 DECREASED FUNCTIONAL MOBILITY: Status: ACTIVE | Noted: 2025-07-10

## 2025-07-10 PROBLEM — R29.898 RIGHT ARM WEAKNESS: Status: ACTIVE | Noted: 2025-07-10

## 2025-07-11 NOTE — PROGRESS NOTES
Outpatient Rehab    Occupational Therapy Evaluation (only)    Patient Name: Drew Kaur  MRN: [id number]  YOB: 1965  Encounter Date: 7/9/2025    Therapy Diagnosis:   Encounter Diagnoses   Name Primary?    Decreased activities of daily living (ADL) Yes    Decreased functional mobility     Right arm weakness     Right hand weakness      Physician: Mariela Newby MD    Physician Orders: Eval and Treat  Medical Diagnosis: CVA (cerebral vascular accident)  Impaired mobility and ADLs  Seizure disorder  Surgical Diagnosis: Not applicable for this Episode   Surgical Date: Not applicable for this Episode  Days Since Last Surgery: Not applicable for this Episode    Visit # / Visits Authorized: 1 / 1  Insurance Authorization Period: 7/7/2025 to 12/31/2025  Date of Evaluation: 7/9/2025  Plan of Care Certification: 7/9/2025 to 9/5/25     Time In: 1217   Time Out: 1302  Total Time (in minutes): 45   Total Billable Time (in minutes): 45    Intake Outcome Measure for FOTO Survey    Therapist reviewed FOTO scores for Drew Kaur on 7/9/2025.   FOTO report - see Media section or FOTO account episode details.     Intake Score:  %    Precautions:       Subjective   History of Present Illness  Drew is a 60 y.o. male who reports to occupational therapy with a chief concern of decreased mobility/functional use of RUE.     The patient reports a medical diagnosis of I63.9 (ICD-10-CM) - CVA (cerebral vascular accident)  Z74.09,Z78.9 (ICD-10-CM) - Impaired mobility and ADLs  G40.909 (ICD-10-CM) - Seizure disorder. The patient has experienced this issue since 06/09/25.   Diagnostic tests related to this condition: MRI studies.   MRI Studies Details: Impression:     1. Acute infarcts involving the left greater than right paramedian howard.  No associated significant mass effect or acute intracranial hemorrhage.  2. Mild generalized cerebral volume loss and extensive chronic small vessel ischemic changes,  including chronic lacunar infarcts in the right centrum semiovale and left thalamus, and bilateral thalamic chronic microhemorrhages.    Dominant Hand: Right  History of Present Condition/Illness: Patient presents with patient wife. He presents in facility wheelchair with AFO donned along with quad cane. He reports he sustained a CVA on June 9. He was admitted here at [facility] for 2 days prior to being discharged to inpatient rehab. He reports prior to inpatient rehab, he was unable to move anything on his right side and had difficulty speaking/swallowing. He was in inpatient rehab from June 12 - July 5 in [place] receiving all 3 disciplines OT, physical therapy, Speech Therapy. He reports much improvement after his time at inpatient rehab, but he still lacks the ability to actively use the right upper extremity. He reports independence in ADLs only requiring assistance with washing his back.   Patient reports using wheelchair, AFO, quad cane, raised toilet seat, and bath bench as assistive equipment.     Activities of Daily Living  Social history was obtained from Patient.    General Prior Level of Function Comments: IND  General Current Level of Function Comments: Mod IND using LUE for ADLs and requiring totalA from spouse to wash back           Treatment History  Treatments  Previously Received Treatments: Yes  Previous Treatments: Physical therapy, Rest, Occupational therapy, Medications - prescription, Electrical stimulation, Speech language therapy, Exercise, Home exercise program  Currently Receiving Treatments: No  Additional Treatment Details: Patient received all disciplines of physical therapy, OT, SLP at inpatient rehab. He reports he was issued a hand gripper with rubberbands to work on strength at home. He reports using one yellow rubberband which is the equivalent of ~5#. Advised patient bring this to next appointment. He reports recently beginning E-Stim for functional motion of RUE prior to  discharge.    Living Arrangements  Living Situation  Housing: Home independently  Living Arrangements: Spouse/significant other  Support Systems: Family members, Spouse/significant other    Home Setup  Type of Structure: House  Home Access: Stairs with rails  Entrance Stairs - Number of Steps: 4  Entrance Stairs - Rails: Both  Bathroom Equipment: Tub transfer bench, Raised toilet seat with rails    Equipment/Treatments  Mobility Equipment: Quad cane  Self-Care Equipment: Sock aid        Employment  Patient reports: Does the patient's condition impact their ability to work?  Employment Status: Employed full-time   Prior to stroke, patient was a  for [employer]. Patient desires to return to work.       Past Medical History/Physical Systems Review:   Drew Kaur  has a past medical history of GERD (gastroesophageal reflux disease), Hypertension, and Seizures.    Drew Kaur  has a past surgical history that includes Eye surgery.    Drew has a current medication list which includes the following prescription(s): amlodipine, aspirin, chlordiazepoxide, levetiracetam, pantoprazole, and potassium chloride.    Review of patient's allergies indicates:  No Known Allergies     Objective      Upper Extremity Sensation  General Upper Extremity Sensation  Intact: Right  Right Upper Extremity Sensation  Intact: Light Touch, Sharp/Dull Discrimination, Kinesthesia, Proprioception, and Hot/Cold Discrimination  Right Upper Extremity Sensation Stocking Glove Pattern: No                  Right Upper Extremity Tone  Patient presents with Hypotonic right upper extremity tone.       Present: Atrophy    Shoulder Range of Motion  Right Shoulder   Active (deg) Passive (deg) Pain   Flexion 39 130     Extension 34       Scaption         ABduction 44 130     ADduction         Horizontal ABduction         Horizontal ADduction         External Rotation (Shoulder ABducted 0 degrees) 52 70     External  Rotation (Shoulder ABducted 45 degrees)         External Rotation (Shoulder ABducted 90 degrees)         Internal Rotation (Shoulder ABducted 0 degrees)  (nearing back pocket)       Internal Rotation (Shoulder ABducted 45 degrees)         Internal Rotation (Shoulder ABducted 90 degrees)           Left Shoulder   Active (deg) Passive (deg) Pain   Flexion 77       Extension 66       Scaption 98       ABduction         ADduction         Horizontal ABduction         Horizontal ADduction         External Rotation (Shoulder ABducted 0 degrees) 61       External Rotation (Shoulder ABducted 45 degrees)         External Rotation (Shoulder ABducted 90 degrees)         Internal Rotation (Shoulder ABducted 0 degrees)  (T12)       Internal Rotation (Shoulder ABducted 45 degrees)         Internal Rotation (Shoulder ABducted 90 degrees)                  Elbow/Forearm Range of Motion   Right Elbow/Forearm   Active (deg) Passive (deg) Pain   Flexion 90       Extension 10       Forearm Pronation 90       Forearm Supination 39         Left Elbow/Forearm   Active (deg) Passive (deg) Pain   Flexion 142       Extension 2       Forearm Pronation         Forearm Supination                  Wrist Range of Motion  Right Wrist   Active (deg) Passive (deg) Pain Comment   Flexion 13         Extension 32         Radial Deviation           Ulnar Deviation             Left Wrist   Active (deg) Passive (deg) Pain Comment   Flexion 50         Extension 68         Radial Deviation           Ulnar Deviation                              Right  Strength  Right Hand Dynamometer Position: 2  Elbow Position Forearm Position Trial 1 (lbs) Trial 2  (lbs) Trial 3  (lbs) Average  (lbs) Pain   Flexed Neutral 5 8 6 6.33         Left  Strength  Left Hand Dynamometer Position: 2  Elbow Position Forearm Position Trial 1 (lbs) Trial 2 (lbs) Trial 3 (lbs) Average (lbs) Pain   Flexed Neutral 66 70 65 67                Wrist/Hand Special Tests  Hand  Coordination Special Tests  Right In Hand Manipulation: Unable       Coordination  Balance  Intact: Static Sitting, Dynamic Sitting, and Static Standing  Impaired: Dynamic Standing       Coordination Tests  Unable: Right Finger to Nose, Right Rapid Alternating Movements (Pronation/Supination), Right Rapid Alternating Movements (Digit Opposition), and Right In Hand Manipulation     Box and block simulation performed for objective measure. Patient was asked to grasp 15 large pompoms one at a time, lift over 12 inch object, and place into bucket on opposing side of object in middle. With left hand, he performed this task in 25.1 seconds. With his right hand, he performed in 2 mins 1 sec. He has minimal active use of hand/digits however, he was able to initiate motion and demonstrate muscles firing with both digital flexion/extension as well as thumb flexion/extension.           Transfers Assessment  Sit to Stand Assistance: Independent  Chair to Bed Assistance: Independent  Bed to Chair Assistance: Independent    Bed Mobility Assessment  Sidelying to Sit Assistance: Independent  Sit to Sidelying Assistance: Independent            Time Entry(in minutes):       Assessment & Plan   Assessment  Drew presents with a condition of High complexity.   Presentation of Symptoms: Stable  Will Comorbidities Impact Care: No       ADL Limitations : Bathing/showering, Dressing, Feeding, Functional mobility, Personal hygiene and grooming, Toileting and toilet hygiene, Sexual activity  IADL Limitations: Community mobility, Driving, Financial management, Grocery/shopping, Health management and maintenance, Home establishment and management, Safety and emergency maintenance, Meal preparation and cleanup  Work Limitations: Job performance, Employment interests and pursuits  Leisure Limitations: Leisure exploration, Leisure participation  Functional Limitations: Activity tolerance, Carrying objects, Fine motor coordination, Functional  mobility, Gross motor coordination, Increased risk of fall, Maintaining balance, Manipulating objects, Proprioception, Range of motion, Reaching, Standing tolerance         Personal Factors Affecting Prognosis: Physical limitations       Evaluation/Treatment Response: Patient responded to treatment well  Prognosis: Good  Assessment Details: Patient presents with decreased RUE use following CVA. He demonstrates decreased range of motion of RUE, decreased strength of RUE, decreased coordination, decreased  strength, decreased fine motor control, decreased functional use, decreased ADL/IADL. Patient would benefit from skilled OT services to address these deficits.     Plan  From an occupational therapy perspective, the patient would benefit from: Skilled Rehab Services    Planned therapy interventions include: Therapeutic exercise, Therapeutic activities, Manual therapy, Neuromuscular re-education, ADLs/IADLs, and Orthotic management and training.    Planned modalities to include: Fluidotherapy, Paraffin bath, Ultrasound, Thermotherapy (hot pack), and Electrical stimulation - attended.        Visit Frequency: 2 times Per Week for 8 Weeks.       This plan was discussed with Patient and Caregiver.   Discussion participants: Agreed Upon Plan of Care             The patient's spiritual, cultural, and educational needs were considered, and the patient is agreeable to the plan of care and goals.           Goals:   Active       LTG       IND with HEP       Start:  07/10/25    Expected End:  09/05/25            Pt to increase R UE AROM by 25 degrees in order to A in UB dressing by dc       Start:  07/10/25    Expected End:  09/05/25            Patient will use his R arm to assist ~50% in at least 2 functional tasks during sessions to promote meaningful limb use by discharge.       Start:  07/10/25    Expected End:  09/05/25            Patient will increase gross grasp in order to assist with holding utensil, jar for  stabilization, generalized functional use of right hand by discharge.        Start:  07/10/25    Expected End:  09/05/25            Patient will be able to achieve less than or equal to 50% on the FOTO, demonstrating overall improved functional ability with upper extremity.  (Self-care category)        Start:  07/10/25    Expected End:  09/05/25               STG       Initiate HEP       Start:  07/10/25    Expected End:  08/08/25            Pt to increase right UE AROM by 10 degrees in order to A in UB dressing by 4 weeks.       Start:  07/10/25    Expected End:  08/08/25            Patient will use his RUE to assist in 2 functional tasks ( pushing open a door, gross placement on a counter to steady items, etc) during each therapy session, with either the left hand supporting or guiding by 4 weeks.       Start:  07/10/25    Expected End:  08/08/25            Patient will increase fine motor coordination as demonstrated by digital opposition to IF, LF, RF in order to assist with manipulating utensils for self feeding by 4 weeks.       Start:  07/10/25    Expected End:  08/08/25            Patient will be able to achieve less than or equal to 75% on the FOTO, demonstrating overall improved functional ability with upper extremity.  (Self-care category)        Start:  07/10/25    Expected End:  09/05/25                Che Dyer OT

## 2025-07-14 ENCOUNTER — CLINICAL SUPPORT (OUTPATIENT)
Dept: REHABILITATION | Facility: HOSPITAL | Age: 60
End: 2025-07-14
Payer: COMMERCIAL

## 2025-07-14 DIAGNOSIS — R29.898 RIGHT HAND WEAKNESS: ICD-10-CM

## 2025-07-14 DIAGNOSIS — R29.898 RIGHT ARM WEAKNESS: ICD-10-CM

## 2025-07-14 DIAGNOSIS — Z78.9 DECREASED ACTIVITIES OF DAILY LIVING (ADL): Primary | ICD-10-CM

## 2025-07-14 DIAGNOSIS — R26.89 DECREASED FUNCTIONAL MOBILITY: ICD-10-CM

## 2025-07-14 PROCEDURE — 97112 NEUROMUSCULAR REEDUCATION: CPT

## 2025-07-14 NOTE — PATIENT INSTRUCTIONS
HOME EXERCISE PROGRAM  Created by MAX Cevallos OTR/L  Jul 14th, 2025  View videos at [email or web address]        SCAPULAR RETRACTIONS    Move your shoulder blades back and down. Hold, relax and repeat. Repeat 10 Times   Complete 2 Sets   Perform 2 Times a Day          SHRUGS    Raise your shoulders upward towards your ears as shown. Shrug both shoulders at the same time.    Video # [id number] Repeat 10 Times   Complete 2 Sets   Perform 2 Times a Day

## 2025-07-14 NOTE — PROGRESS NOTES
"  Outpatient Rehab    Occupational Therapy Visit    Patient Name: Drew Kaur  MRN: [id number]  YOB: 1965  Encounter Date: 7/14/2025    Therapy Diagnosis:   Encounter Diagnoses   Name Primary?    Decreased activities of daily living (ADL) Yes    Decreased functional mobility     Right arm weakness     Right hand weakness      Physician: Marieal Newby MD    Physician Orders: Eval and Treat  Medical Diagnosis: CVA (cerebral vascular accident)  Impaired mobility and ADLs  Seizure disorder  Surgical Diagnosis: Not applicable for this Episode   Surgical Date: Not applicable for this Episode  Days Since Last Surgery: Not applicable for this Episode    Visit # / Visits Authorized: 1 / 10  Insurance Authorization Period: 7/9/2025 to 12/31/2025  Date of Evaluation: 7/9/2025  Plan of Care Certification: 7/11/2025 to 9/5/2025      Time In: 0105   Time Out: 0155  Total Time (in minutes): 50   Total Billable Time (in minutes): 50    FOTO:  Intake Score: Not applicable for this Episode%  Survey Score 2: Not applicable for this Episode%  Survey Score 3: Not applicable for this Episode%    Precautions:       Subjective   "doing alright".  Pain reported as 0/10.      Objective            Treatment:  Balance/Neuromuscular Re-Education  NMR 1: PROM shoulder x10 reps flexion, abduction, er  NMR 2: infrapaddle x15 mins  NMR 3: AAROM bilateral hand use to ~100 degress flexion  NMR 4: air cast donned scap stabilizations 2/3 x10 sec hold, circles 2/10  NMR 5: pnf in supine D1/D2 flex/ext x10 each 0 wt  NMR 6: UE ranger sidelying FF x20 0 wt  NMR 7: scap squeeze/shrug x20 each  NMR 8: bilateral hand use chest press with blue ball 3/10  NMR 9: weight bearing physioball L over R x6 x10 sec hold  NMR 10: Estim, [place] 37, 10/10 cycle, to facilitate wrist/digit extension    Time Entry(in minutes):  Neuromuscular Re-Education Time Entry: 50    Assessment & Plan   Assessment: Patient would continue to benefit from " skilled OT services. He performed all exercises fairly well. Scapular stabilization in supine performed well with some muscle fatigue noted. Weight bearing, PNF patterns, AAROM, and estim all initiated today. Good tolerance. Will progress as able.  Evaluation/Treatment Tolerance: Patient tolerated treatment well, Patient limited by fatigue    The patient will continue to benefit from skilled outpatient occupational therapy in order to address the deficits listed in the problem list on the initial evaluation, provide patient and family education, and maximize the patients level of independence in the home and community environments.     The patient's spiritual, cultural, and educational needs were considered, and the patient is agreeable to the plan of care and goals.           Plan: continue per initial plan of care    Goals:   Active       LTG       IND with HEP (Progressing)       Start:  07/10/25    Expected End:  09/05/25            Pt to increase R UE AROM by 25 degrees in order to A in UB dressing by dc (Progressing)       Start:  07/10/25    Expected End:  09/05/25            Patient will use his R arm to assist ~50% in at least 2 functional tasks during sessions to promote meaningful limb use by discharge. (Progressing)       Start:  07/10/25    Expected End:  09/05/25            Patient will increase gross grasp in order to assist with holding utensil, jar for stabilization, generalized functional use of right hand by discharge.  (Progressing)       Start:  07/10/25    Expected End:  09/05/25            Patient will be able to achieve less than or equal to 50% on the FOTO, demonstrating overall improved functional ability with upper extremity.  (Self-care category)  (Progressing)       Start:  07/10/25    Expected End:  09/05/25               STG       Initiate HEP (Met)       Start:  07/10/25    Expected End:  08/08/25    Resolved:  07/14/25         Pt to increase right UE AROM by 10 degrees in order to A  in UB dressing by 4 weeks. (Progressing)       Start:  07/10/25    Expected End:  08/08/25            Patient will use his RUE to assist in 2 functional tasks ( pushing open a door, gross placement on a counter to steady items, etc) during each therapy session, with either the left hand supporting or guiding by 4 weeks. (Progressing)       Start:  07/10/25    Expected End:  08/08/25            Patient will increase fine motor coordination as demonstrated by digital opposition to IF, LF, RF in order to assist with manipulating utensils for self feeding by 4 weeks. (Progressing)       Start:  07/10/25    Expected End:  08/08/25            Patient will be able to achieve less than or equal to 75% on the FOTO, demonstrating overall improved functional ability with upper extremity.  (Self-care category)  (Progressing)       Start:  07/10/25    Expected End:  09/05/25                Che Dyer OT

## 2025-07-15 ENCOUNTER — CLINICAL SUPPORT (OUTPATIENT)
Dept: REHABILITATION | Facility: HOSPITAL | Age: 60
End: 2025-07-15
Payer: COMMERCIAL

## 2025-07-15 DIAGNOSIS — Z86.73 H/O: CVA (CEREBROVASCULAR ACCIDENT): Primary | ICD-10-CM

## 2025-07-15 PROCEDURE — 92507 TX SP LANG VOICE COMM INDIV: CPT

## 2025-07-16 NOTE — PROGRESS NOTES
Outpatient Rehab    Speech-Language Pathology Visit    Patient Name: Drew Kaur  MRN: [id number]  YOB: 1965  Encounter Date: 7/15/2025    Therapy Diagnosis:   Encounter Diagnosis   Name Primary?    H/O: CVA (cerebrovascular accident) Yes     Physician: Mariela Newby MD    Physician Orders: Eval and Treat  Medical Diagnosis: CVA (cerebral vascular accident)  Impaired mobility and ADLs  Seizure disorder  Surgical Diagnosis: Not applicable for this Episode   Surgical Date: Not applicable for this Episode  Days Since Last Surgery: Not applicable for this Episode    Visit # / Visits Authorized: 1 / 10   Insurance Authorization Period: 7/9/2025 to 12/31/2025  Date of Evaluation: 7/9/2025   Plan of Care Certification: 7/9/2025 to 9/3/2025      Time In: 1545   Time Out: 1630  Total Time (in minutes): 45   Total Billable Time (in minutes): 45         Subjective   Patient reported no changes or complaints since initial evaluation. He stated that he is able to tolerate current diet of regular textures and thin liquids void overt s/s of laryngeal compromise. Patient was agreeable to intervention and his wife remained in waiting room for the duration of treatment session..  Pain reported as 0/10.        Objective          Client participated in structured speech tasks targeting phonation and articulation. Speech intelligibility noted to be reduced in spontaneous speech and improved with slowed rate and over-articulation strategies. Client demonstrated increased effort with multisyllabic word production and showed mild deficits in speech intelligibility during reading tasks. Verbal output was characterized by imprecise consonants and blends. Visual and verbal cues were provided to support speech clarity.    Treatment  Speech  Activity 1: Oral Motor Coordination (AMR/SMR)  Activity 2: Articulation hiearchy drill work (s/z)  Activity 3: Scripted conversation (s/z)  Activity 4: Self assessment    Time  Entry(in minutes):  Speech Treatment (Individual) Time Entry: 45    Assessment & Plan   Assessment  Patient improved articulatory precision in targeted speech sounds (/s/, /z/) to increase speech intelligibility to at least 80% during structured tasks, as measured by clinician ratings. Patient demonstrated improved coordination of alternating oral movements (e.g., AMRs and SMRs) with reduced effort and increased accuracy in 4 out of 5 trials. Patient implemented compensatory communication strategies (e.g., slowing rate, over-articulation) to enhance intelligibility during spontaneous speech with moderate verbal clinician cues. Patient has maintained safe swallowing by consistently employing compensatory strategies during oral intake, as reported by patient.  Evaluation/Treatment Tolerance: Patient tolerated treatment well    The patient will continue to benefit from skilled outpatient speech therapy in order to address the deficits listed in the problem list on the initial evaluation, provide patient and family education, and maximize the patients level of independence in the home and community environments.     The patient's spiritual, cultural, and educational needs were considered, and the patient is agreeable to the plan of care and goals.     Education  Education was done with Patient. The patient's learning style includes Listening and Demonstration. The patient Demonstrates understanding.         Patient to practiced AMR/SMR, articulatory hierarchy drills, scripted conversation. Patient is to implement s/z cues (retracting lips) and articulatory compensatory strategies (slow rate, over articulate). Patient is to implement self rating scale (1-5) with goal of average score of 4 during daily practice.         Plan  Continue current plan of care  Diet Recommendation: Oral diet  Slow feeding rate, small single bites/sips    Goals:   Active       Long Term Goals       1. Patient will achieve functional speech  intelligibility of 95% or higher during conversational speech with unfamiliar listeners in clinical and community settings. (Progressing)       Start:  07/09/25    Expected End:  09/03/25            2. Patient will demonstrate independent use of self-monitoring techniques and communication repair strategies to maintain effective communication. (Progressing)       Start:  07/09/25    Expected End:  09/03/25            3. Patient will continue to maintain safe swallowing status with no signs of aspiration or compensatory strategy breakdown. (Progressing)       Start:  07/09/25    Expected End:  09/03/25               Short Term Goals        1. Patient will improve articulatory precision in targeted speech sounds (/s/, /z/, /ch, sh/) and consonant blends to increase speech intelligibility to at least 90% during structured tasks, as measured by clinician ratings. (Progressing)       Start:  07/09/25    Expected End:  09/03/25            2. Patient will demonstrate improved coordination of alternating oral movements (e.g., AMRs and SMRs) with reduced effort and increased accuracy in 4 out of 5 trials. (Progressing)       Start:  07/09/25    Expected End:  09/03/25            3. Patient will implement compensatory communication strategies (e.g., slowing rate, over-articulation) to enhance intelligibility during spontaneous speech with minimal clinician cues. (Progressing)       Start:  07/09/25    Expected End:  09/03/25            4. Patient will maintain safe swallowing by consistently employing compensatory strategies during oral intake, as reported by patient and observed by clinician. (Progressing)       Start:  07/09/25    Expected End:  09/03/25                Leigha Kate CCC-SLP

## 2025-07-17 ENCOUNTER — CLINICAL SUPPORT (OUTPATIENT)
Dept: REHABILITATION | Facility: HOSPITAL | Age: 60
End: 2025-07-17
Payer: COMMERCIAL

## 2025-07-17 DIAGNOSIS — R26.89 DECREASED FUNCTIONAL MOBILITY: ICD-10-CM

## 2025-07-17 DIAGNOSIS — R29.898 RIGHT ARM WEAKNESS: ICD-10-CM

## 2025-07-17 DIAGNOSIS — R29.898 RIGHT HAND WEAKNESS: ICD-10-CM

## 2025-07-17 DIAGNOSIS — Z78.9 DECREASED ACTIVITIES OF DAILY LIVING (ADL): Primary | ICD-10-CM

## 2025-07-17 PROCEDURE — 97112 NEUROMUSCULAR REEDUCATION: CPT

## 2025-07-17 NOTE — PROGRESS NOTES
Outpatient Rehab    Occupational Therapy Visit    Patient Name: Drew Kaur  MRN: [id number]  YOB: 1965  Encounter Date: 7/17/2025    Therapy Diagnosis:   Encounter Diagnoses   Name Primary?    Decreased activities of daily living (ADL) Yes    Decreased functional mobility     Right arm weakness     Right hand weakness      Physician: Mariela Newby MD    Physician Orders: Eval and Treat  Medical Diagnosis: CVA (cerebral vascular accident)  Impaired mobility and ADLs  Seizure disorder  Surgical Diagnosis: Not applicable for this Episode   Surgical Date: Not applicable for this Episode  Days Since Last Surgery: Not applicable for this Episode    Visit # / Visits Authorized: 2 / 10  Insurance Authorization Period: 7/9/2025 to 12/31/2025  Date of Evaluation: 7/9/2025  Plan of Care Certification: 7/11/2025 to 9/5/2025      Time In: 1107   Time Out: 1152  Total Time (in minutes): 45   Total Billable Time (in minutes): 45    FOTO:  Intake Score: Not applicable for this Episode%  Survey Score 2: Not applicable for this Episode%  Survey Score 3: Not applicable for this Episode%    Precautions:       Subjective   Pt reports he is doing good & reports he has been performing shrugs/squeezes and supine PNF at home..  Pain reported as 0/10.      Objective            Treatment:  Balance/Neuromuscular Re-Education  NMR 1: PROM shoulder x10 reps flexion, abduction, er  NMR 2: infrapaddle x15 mins (held)  NMR 3: AAROM bilateral hand use to ~100 degress flexion 2/10  NMR 4: air cast donned scap stabilizations 3/3 x10 sec hold, circles 2/10  NMR 5: pnf in supine D1/D2 flex/ext x10 each 0 wt  NMR 6: UE ranger sidelying FF x20 0 wt, forward reach x20  NMR 7: scap squeeze/shrug x20 each  NMR 8: bilateral hand use chest press with blue ball 3/10, trunk twists with ball 2/10  NMR 9: weight bearing physioball L over R x6 x10 sec hold  NMR 10: Estim, [place] 42, 10/10 cycle, to facilitate wrist/digit extension x8  min    Time Entry(in minutes):  Neuromuscular Re-Education Time Entry: 45    Assessment & Plan   Assessment: Patient tolerated treatment well overall. Exercises performed well. Able to add resistance to scapular retractions which he tolerated well. He reports he has been performing supine PNF at home. Advised continuation of this. Will continue to progress as tolerable.  Evaluation/Treatment Tolerance: Patient tolerated treatment well, Patient limited by fatigue    The patient will continue to benefit from skilled outpatient occupational therapy in order to address the deficits listed in the problem list on the initial evaluation, provide patient and family education, and maximize the patients level of independence in the home and community environments.     The patient's spiritual, cultural, and educational needs were considered, and the patient is agreeable to the plan of care and goals.           Plan: continue per initial plan of care    Goals:   Active       LTG       IND with HEP (Progressing)       Start:  07/10/25    Expected End:  09/05/25            Pt to increase R UE AROM by 25 degrees in order to A in UB dressing by dc (Progressing)       Start:  07/10/25    Expected End:  09/05/25            Patient will use his R arm to assist ~50% in at least 2 functional tasks during sessions to promote meaningful limb use by discharge. (Progressing)       Start:  07/10/25    Expected End:  09/05/25            Patient will increase gross grasp in order to assist with holding utensil, jar for stabilization, generalized functional use of right hand by discharge.  (Progressing)       Start:  07/10/25    Expected End:  09/05/25            Patient will be able to achieve less than or equal to 50% on the FOTO, demonstrating overall improved functional ability with upper extremity.  (Self-care category)  (Progressing)       Start:  07/10/25    Expected End:  09/05/25               STG       Initiate HEP (Met)       Start:   07/10/25    Expected End:  08/08/25    Resolved:  07/14/25         Pt to increase right UE AROM by 10 degrees in order to A in UB dressing by 4 weeks. (Progressing)       Start:  07/10/25    Expected End:  08/08/25            Patient will use his RUE to assist in 2 functional tasks ( pushing open a door, gross placement on a counter to steady items, etc) during each therapy session, with either the left hand supporting or guiding by 4 weeks. (Progressing)       Start:  07/10/25    Expected End:  08/08/25            Patient will increase fine motor coordination as demonstrated by digital opposition to IF, LF, RF in order to assist with manipulating utensils for self feeding by 4 weeks. (Progressing)       Start:  07/10/25    Expected End:  08/08/25            Patient will be able to achieve less than or equal to 75% on the FOTO, demonstrating overall improved functional ability with upper extremity.  (Self-care category)  (Progressing)       Start:  07/10/25    Expected End:  09/05/25                Che Dyer OT

## 2025-07-22 ENCOUNTER — CLINICAL SUPPORT (OUTPATIENT)
Dept: REHABILITATION | Facility: HOSPITAL | Age: 60
End: 2025-07-22
Payer: COMMERCIAL

## 2025-07-22 DIAGNOSIS — R26.89 IMPAIRED GAIT AND MOBILITY: Primary | ICD-10-CM

## 2025-07-22 PROCEDURE — 97530 THERAPEUTIC ACTIVITIES: CPT

## 2025-07-22 PROCEDURE — 97161 PT EVAL LOW COMPLEX 20 MIN: CPT

## 2025-07-23 ENCOUNTER — CLINICAL SUPPORT (OUTPATIENT)
Dept: REHABILITATION | Facility: HOSPITAL | Age: 60
End: 2025-07-23
Payer: COMMERCIAL

## 2025-07-23 DIAGNOSIS — R29.898 RIGHT HAND WEAKNESS: ICD-10-CM

## 2025-07-23 DIAGNOSIS — Z78.9 DECREASED ACTIVITIES OF DAILY LIVING (ADL): Primary | ICD-10-CM

## 2025-07-23 DIAGNOSIS — R26.89 DECREASED FUNCTIONAL MOBILITY: ICD-10-CM

## 2025-07-23 DIAGNOSIS — R29.898 RIGHT ARM WEAKNESS: ICD-10-CM

## 2025-07-23 PROCEDURE — 97530 THERAPEUTIC ACTIVITIES: CPT

## 2025-07-23 PROCEDURE — 97112 NEUROMUSCULAR REEDUCATION: CPT

## 2025-07-23 NOTE — PROGRESS NOTES
Outpatient Rehab    Occupational Therapy Visit    Patient Name: Drew Kaur  MRN: [id number]  YOB: 1965  Encounter Date: 7/23/2025    Therapy Diagnosis:   Encounter Diagnoses   Name Primary?    Decreased activities of daily living (ADL) Yes    Decreased functional mobility     Right arm weakness     Right hand weakness      Physician: Mariela Newby MD    Physician Orders: Eval and Treat  Medical Diagnosis: CVA (cerebral vascular accident)  Impaired mobility and ADLs  Seizure disorder  Surgical Diagnosis: Not applicable for this Episode   Surgical Date: Not applicable for this Episode  Days Since Last Surgery: Not applicable for this Episode    Visit # / Visits Authorized: 3 / 10  Insurance Authorization Period: 7/9/2025 to 12/31/2025  Date of Evaluation: 7/9/2025  Plan of Care Certification: 7/11/2025 to 9/5/2025      Time In: 1220   Time Out: 1304  Total Time (in minutes): 44   Total Billable Time (in minutes): 44    FOTO:  Intake Score (%): Not applicable for this Episode  Survey Score 2 (%): Not applicable for this Episode  Survey Score 3 (%): Not applicable for this Episode    Precautions:       Subjective   Pt reports he is feeling good..         Objective            Treatment:  Therapeutic Activity  TA 1: cone grasping with R hand, passing to L hand, and stacking on L side of body x10 cones  TA 2: bilateral hand use reaction time activity using tennis ball x4 min; drop with left, attempt to catch (react) with R  TA 3: digit opposition with Patricia to perform  TA 4: passive place with active holds x3 digital flexion x3 digital extension  Balance/Neuromuscular Re-Education  NMR 1: PROM shoulder x10 reps flexion, abduction, er  NMR 2: supine elbow flex/ext 1# 2/10  NMR 3: dowel AAROM 1# dowel 2/10  NMR 4: air cast donned scap stabilizations 3/3 x10 sec hold, circles 2/10  NMR 5: pnf in supine D1/D2 flex/ext 2x10 each 0 wt  NMR 6: UE ranger sidelying FF x20 1# wrist wt, forward reach  x20  NMR 7: scap squeeze (orange band) /shrug x20 each  NMR 8: bilateral hand use chest press with blue ball 3/10, trunk twists with ball 2/10  NMR 9: weight bearing physioball L over R x6 x10 sec hold    Time Entry(in minutes):  Neuromuscular Re-Education Time Entry: 34  Therapeutic Activity Time Entry: 10    Assessment & Plan   Assessment: Patient tolerated treatment well overall. He demonstrated increased ability to perform scapular stabilization exercises with better tolerance today. Able to add resistance to supine dowel, supine elbow, and sidelying upper extremity ranger exercises. Integrated some hand activities today to assist with increased hand use. He is able to oppose to digits 2 & 3 with Patricia. He demonstrated some fatigue most notably with supine PNF exercises as we did increase repetitions today.  Will continue to advance within patient tolerance.   Evaluation/Treatment Tolerance: Patient tolerated treatment well, Patient limited by fatigue    The patient will continue to benefit from skilled outpatient occupational therapy in order to address the deficits listed in the problem list on the initial evaluation, provide patient and family education, and maximize the patients level of independence in the home and community environments.     The patient's spiritual, cultural, and educational needs were considered, and the patient is agreeable to the plan of care and goals.           Plan: continue per initial plan of care    Goals:   Active       LTG       IND with HEP (Progressing)       Start:  07/10/25    Expected End:  09/05/25            Pt to increase R UE AROM by 25 degrees in order to A in UB dressing by dc (Progressing)       Start:  07/10/25    Expected End:  09/05/25            Patient will use his R arm to assist ~50% in at least 2 functional tasks during sessions to promote meaningful limb use by discharge. (Progressing)       Start:  07/10/25    Expected End:  09/05/25            Patient will  increase gross grasp in order to assist with holding utensil, jar for stabilization, generalized functional use of right hand by discharge.  (Progressing)       Start:  07/10/25    Expected End:  09/05/25            Patient will be able to achieve less than or equal to 50% on the FOTO, demonstrating overall improved functional ability with upper extremity.  (Self-care category)  (Progressing)       Start:  07/10/25    Expected End:  09/05/25               STG       Initiate HEP (Met)       Start:  07/10/25    Expected End:  08/08/25    Resolved:  07/14/25         Pt to increase right UE AROM by 10 degrees in order to A in UB dressing by 4 weeks. (Progressing)       Start:  07/10/25    Expected End:  08/08/25            Patient will use his RUE to assist in 2 functional tasks ( pushing open a door, gross placement on a counter to steady items, etc) during each therapy session, with either the left hand supporting or guiding by 4 weeks. (Progressing)       Start:  07/10/25    Expected End:  08/08/25            Patient will increase fine motor coordination as demonstrated by digital opposition to IF, LF, RF in order to assist with manipulating utensils for self feeding by 4 weeks. (Progressing)       Start:  07/10/25    Expected End:  08/08/25            Patient will be able to achieve less than or equal to 75% on the FOTO, demonstrating overall improved functional ability with upper extremity.  (Self-care category)  (Progressing)       Start:  07/10/25    Expected End:  09/05/25                Che Dyer OT

## 2025-07-25 ENCOUNTER — CLINICAL SUPPORT (OUTPATIENT)
Dept: REHABILITATION | Facility: HOSPITAL | Age: 60
End: 2025-07-25
Payer: COMMERCIAL

## 2025-07-25 DIAGNOSIS — R26.89 DECREASED FUNCTIONAL MOBILITY: ICD-10-CM

## 2025-07-25 DIAGNOSIS — R29.898 RIGHT ARM WEAKNESS: ICD-10-CM

## 2025-07-25 DIAGNOSIS — R29.898 RIGHT HAND WEAKNESS: ICD-10-CM

## 2025-07-25 DIAGNOSIS — Z86.73 H/O: CVA (CEREBROVASCULAR ACCIDENT): Primary | ICD-10-CM

## 2025-07-25 DIAGNOSIS — Z78.9 DECREASED ACTIVITIES OF DAILY LIVING (ADL): Primary | ICD-10-CM

## 2025-07-25 PROCEDURE — 92507 TX SP LANG VOICE COMM INDIV: CPT

## 2025-07-25 PROCEDURE — 97530 THERAPEUTIC ACTIVITIES: CPT

## 2025-07-25 PROCEDURE — 97112 NEUROMUSCULAR REEDUCATION: CPT

## 2025-07-25 PROCEDURE — 97535 SELF CARE MNGMENT TRAINING: CPT

## 2025-07-25 NOTE — PROGRESS NOTES
Outpatient Rehab    Physical Therapy Evaluation    Patient Name: Drew Kaur  MRN: [id number]  YOB: 1965  Encounter Date: 7/22/2025    Therapy Diagnosis:   Encounter Diagnosis   Name Primary?    Impaired gait and mobility Yes     Physician: Mariela Newby MD    Physician Orders: Eval and Treat  Medical Diagnosis: CVA (cerebral vascular accident)  Impaired mobility and ADLs  Seizure disorder  Surgical Diagnosis: Not applicable for this Episode   Surgical Date: Not applicable for this Episode  Days Since Last Surgery: Not applicable for this Episode    Visit # / Visits Authorized:  1 / 1  Insurance Authorization Period: 7/7/2025 to 12/31/2025  Date of Evaluation: 7/22/2025  Plan of Care Certification: 7/22/2025 to 10/22/25     Time In: 0805   Time Out: 0855  Total Time (in minutes): 50   Total Billable Time (in minutes):      Intake Outcome Measure for FOTO Survey    Therapist reviewed FOTO scores for Drew Kaur on 7/22/2025.   FOTO report - see Media section or FOTO account episode details.     Intake Score (%): Not applicable for this Episode    Precautions:       Subjective   History of Present Illness  Drew is a 60 y.o. male who reports to physical therapy with a chief concern of weakness.     The patient reports a medical diagnosis of 63.9 (ICD-10-CM) - CVA (cerebral vascular accident)  Z74.09,Z78.9 (ICD-10-CM) - Impaired mobility and ADLs  G40.909 (ICD-10-CM) - Seizure disorder. The patient has experienced this issue since 07/22/25.           Dominant Hand: Right  History of Present Condition/Illness: Pt reports having a stroke on June 9, 2025. Pt reports his right side feels weaker at this time, no deficits in vision at this time. Pt went to a rehab in [place] where he was able to make gains. Pt reports his biggest concern is to be able to not walk without a cane or AFO.     Activities of Daily Living  Social history was obtained from Patient.    General Prior Level of  Function Comments: I  General Current Level of Function Comments: assistance with       Previously independent with activities of daily living? Yes     Currently independent with activities of daily living? No  Activities currently needing assistance include Dressing - upper body.        Previously independent with instrumental activities of daily living? Yes     Currently independent with instrumental activities of daily living? No              Pain     Patient reports a current pain level of 0/10. Pain at best is reported as 0/10. Pain at worst is reported as 0/10.             Review of Systems  Patient denies: Cancer History, Cardiac History, and Diabetes        Treatment History  Treatments  Previously Received Treatments: Yes  Currently Receiving Treatments: Yes  Current Treatments: Occupational therapy, Speech language therapy    Living Arrangements  Living Situation  Housing: Home independently  Living Arrangements: Spouse/significant other  Support Systems: Family members, Spouse/significant other    Home Setup  Type of Structure: House  Home Access: Stairs with rails  Entrance Stairs - Number of Steps: 3  Entrance Stairs - Rails: Both        Employment  Patient reports: Does the patient's condition impact their ability to work?  Employment Status: Employed full-time          Past Medical History/Physical Systems Review:   Drew Kaur  has a past medical history of GERD (gastroesophageal reflux disease), Hypertension, and Seizures.    Drew Kaur  has a past surgical history that includes Eye surgery.    Drew has a current medication list which includes the following prescription(s): amlodipine, aspirin, chlordiazepoxide, levetiracetam, pantoprazole, and potassium chloride.    Review of patient's allergies indicates:  No Known Allergies     Objective            Hip Strength - Planes of Motion   Right Strength Right Pain Left Strength Left  Pain   Flexion (L2) 3+   4+     Extension            ABduction           ADduction           Internal Rotation           External Rotation               Knee Strength   Right Strength Right Pain Left Strength Left  Pain   Flexion (S2) 4-   4+     Prone Flexion           Extension (L3) 3+   4+                   Fall Risk  Functional mobility test results suggest the patient is not: At Risk for Falls  Timed Up & Go (TUG)  Time: 18.56 seconds  Observations: Slow tentative pace and Little or no arm swing  An older adult who takes >=12 seconds to complete the TUG is at risk for falling.       Sit to Stand Testing      The patient completed 12 repetitions of a sit to stand transfer in 30 seconds.           Ambulation Assistance Required  Surface With  Assistive Device Without Assistive Device Details   Level Independent Supervision      Uneven         Curb           Gait Analysis  Base of Support: Wide  Gait Pattern: Antalgic, Ataxic  Walking Speed: Decreased            Gait Analysis Details  Right foot drop has an AFO at this time to assist with drop.          Treatment:  Therapeutic Activity  TA 1: stepper 15 minutes level 0  TA 2: education on HEP and importance of WB activites.    Time Entry(in minutes):  PT Evaluation (Low) Time Entry: 30  Therapeutic Activity Time Entry: 20    Assessment & Plan   Plan  From a physical therapy perspective, the patient would benefit from: Skilled Rehab Services    Planned therapy interventions include: Therapeutic activities, Therapeutic exercise, Neuromuscular re-education, Manual therapy, ADLs/IADLs, and Gait training.    Planned modalities to include: Electrical stimulation - attended, Electrical stimulation - passive/unattended, Ultrasound, Thermotherapy (hot pack), and Low-level laser therapy.        Visit Frequency: 2 times Per Week for 6 Weeks.       This plan was discussed with Patient, Therapy assistant, and Caregiver.   Discussion participants: Agreed Upon Plan of Care             The patient's spiritual, cultural, and  educational needs were considered, and the patient is agreeable to the plan of care and goals.           Goals:   Active       LTG       Pt will improve FOTO.        Start:  07/22/25    Expected End:  09/02/25            Pt will be able to ambulate with no AD unlimited distance.        Start:  07/22/25    Expected End:  09/02/25            Pt will improve functional mobility with no fear of falling.       Start:  07/22/25    Expected End:  09/02/25               STG       Pt will be independent with HEP.        Start:  07/22/25    Expected End:  08/12/25            Pt will be able to improve sit <> stand within 30 seconds to at least 15.        Start:  07/22/25    Expected End:  08/12/25            Pt will be able to go up and down stairs step over step.        Start:  07/22/25    Expected End:  08/12/25                Leigha Jamison, PT

## 2025-07-25 NOTE — PROGRESS NOTES
Outpatient Rehab    Speech-Language Pathology Visit    Patient Name: Drew Kaur  MRN: [id number]  YOB: 1965  Encounter Date: 7/25/2025    Therapy Diagnosis:   Encounter Diagnosis   Name Primary?    H/O: CVA (cerebrovascular accident) Yes     Physician: Mariela Newby MD    Physician Orders: Eval and Treat  Medical Diagnosis: CVA (cerebral vascular accident)  Impaired mobility and ADLs  Seizure disorder  Surgical Diagnosis: Not applicable for this Episode   Surgical Date: Not applicable for this Episode  Days Since Last Surgery: Not applicable for this Episode    Visit # / Visits Authorized: 2 / 10   Insurance Authorization Period: 7/9/2025 to 12/31/2025  Date of Evaluation: 7/9/2025   Plan of Care Certification: 7/9/2025 to 9/3/2025      Time In: 0325   Time Out: 0408  Total Time (in minutes): 43   Total Billable Time (in minutes): 43      Subjective   Patient agreeable and cooperative during the session. Wife remained in waiting room. He reports no changes in communication or cognitive status. Patient states heris completing homework as assigned and reading one chapter per day out loud with compensatory speech strategies. Denies any swallowing difficulties or concerns. Bottom dentures not in place during session, patient stated that he feels this is contributing to decreased speech intelligibility..  Pain reported as 0/10.        Objective            Treatment  Speech  Activity 1: Oral Motor Coordination (AMR/SMR)  Activity 2: Articulation hiearchy drill work (sh/ch/s blends)  Activity 3: Paragraph reading (s)  Activity 4: Self assessment  Activity 5: Review of compensatory strategies    Time Entry(in minutes):  Speech Sound Production Eval Time Entry: 43    Assessment & Plan   Assessment  Patient improved articulatory precision in targeted speech sounds (/sh/, /ch/, s blends) to increase speech intelligibility to at least 70% during structured tasks, as measured by clinician ratings.  Patient demonstrated improved coordination of alternating oral movements (e.g., AMRs and SMRs) with reduced effort and increased accuracy in 5 out of 5 trials. Patient implemented compensatory communication strategies (e.g., slowing rate, over-articulation) to enhance intelligibility during spontaneous speech with minimal verbal clinician cues. Patient has maintained safe swallowing by consistently employing compensatory strategies during oral intake, as reported by patient.  Evaluation/Treatment Tolerance: Patient tolerated treatment well    The patient will continue to benefit from skilled outpatient speech therapy in order to address the deficits listed in the problem list on the initial evaluation, provide patient and family education, and maximize the patients level of independence in the home and community environments.     The patient's spiritual, cultural, and educational needs were considered, and the patient is agreeable to the plan of care and goals.     Education  Education was done with Patient. The patient's learning style includes Listening and Demonstration. The patient Demonstrates understanding.         Patient to continue practicing AMR/SMR, articulatory hierarchy drills, scripted conversation, and paragraphs targeting speech sounds addressed in therapy. Patient is to implement s/z cues (retracting lips) and articulatory compensatory strategies (slow rate, over articulate). Patient is to implement self rating scale (1-5) with goal of average score of 4 during daily practice. During daily practice, patient is to utilize mirror to monitor facial and labial symmetry when practicing (pat, delmi) and all other assigned speech drills.         Plan  Continue current plan of care. Patient is to wear bottom dentures during next treatment session.  Diet Recommendation: Oral diet  Slow feeding rate, small single bites/sips    Goals:   Active       Long Term Goals       1. Patient will achieve functional speech  intelligibility of 95% or higher during conversational speech with unfamiliar listeners in clinical and community settings. (Progressing)       Start:  07/09/25    Expected End:  09/03/25            2. Patient will demonstrate independent use of self-monitoring techniques and communication repair strategies to maintain effective communication. (Progressing)       Start:  07/09/25    Expected End:  09/03/25            3. Patient will continue to maintain safe swallowing status with no signs of aspiration or compensatory strategy breakdown. (Progressing)       Start:  07/09/25    Expected End:  09/03/25               Short Term Goals        1. Patient will improve articulatory precision in targeted speech sounds (/s/, /z/, /ch, sh/) and consonant blends to increase speech intelligibility to at least 90% during structured tasks, as measured by clinician ratings. (Progressing)       Start:  07/09/25    Expected End:  09/03/25            2. Patient will demonstrate improved coordination of alternating oral movements (e.g., AMRs and SMRs) with reduced effort and increased accuracy in 4 out of 5 trials. (Met)       Start:  07/09/25    Expected End:  09/03/25    Resolved:  07/25/25         3. Patient will implement compensatory communication strategies (e.g., slowing rate, over-articulation) to enhance intelligibility during spontaneous speech with minimal clinician cues. (Progressing)       Start:  07/09/25    Expected End:  09/03/25            4. Patient will maintain safe swallowing by consistently employing compensatory strategies during oral intake, as reported by patient and observed by clinician. (Progressing)       Start:  07/09/25    Expected End:  09/03/25                Leigha Kate CCC-SLP

## 2025-07-28 ENCOUNTER — CLINICAL SUPPORT (OUTPATIENT)
Dept: REHABILITATION | Facility: HOSPITAL | Age: 60
End: 2025-07-28
Payer: COMMERCIAL

## 2025-07-28 DIAGNOSIS — Z78.9 DECREASED ACTIVITIES OF DAILY LIVING (ADL): Primary | ICD-10-CM

## 2025-07-28 DIAGNOSIS — R29.898 RIGHT ARM WEAKNESS: ICD-10-CM

## 2025-07-28 DIAGNOSIS — R26.89 DECREASED FUNCTIONAL MOBILITY: ICD-10-CM

## 2025-07-28 DIAGNOSIS — R29.898 RIGHT HAND WEAKNESS: ICD-10-CM

## 2025-07-28 PROCEDURE — 97535 SELF CARE MNGMENT TRAINING: CPT

## 2025-07-28 PROCEDURE — 97112 NEUROMUSCULAR REEDUCATION: CPT

## 2025-07-28 NOTE — PROGRESS NOTES
Outpatient Rehab    Occupational Therapy Visit    Patient Name: Drew Kaur  MRN: [id number]  YOB: 1965  Encounter Date: 7/28/2025    Therapy Diagnosis:   Encounter Diagnoses   Name Primary?    Decreased activities of daily living (ADL) Yes    Decreased functional mobility     Right arm weakness     Right hand weakness      Physician: Mariela Newby MD    Physician Orders: Eval and Treat  Medical Diagnosis: CVA (cerebral vascular accident)  Impaired mobility and ADLs  Seizure disorder  Surgical Diagnosis: Not applicable for this Episode   Surgical Date: Not applicable for this Episode  Days Since Last Surgery: Not applicable for this Episode    Visit # / Visits Authorized: 5 / 10  Insurance Authorization Period: 7/9/2025 to 12/31/2025  Date of Evaluation: 7/9/2025  Plan of Care Certification: 7/11/2025 to 9/5/2025      Time In: 0319   Time Out: 0403  Total Time (in minutes): 44   Total Billable Time (in minutes): 44    FOTO:  Intake Score (%): Not applicable for this Episode  Survey Score 2 (%): Not applicable for this Episode  Survey Score 3 (%): Not applicable for this Episode    Precautions:       Subjective   Pt reports he is doing well.         Objective            Treatment:  Self Care and ADLs  ADL 1: Pt stood at waist height (or slightly above) and folded 8 washcloths using B hands to grasp, reach, coordinate, and stack each cloth with supervision/min cues as needed (not performed today)  ADL 2: simulated meal prep/feeding tasks using built up utensils, putty, rice bin with active shoulder/elbow flex to bring utensil to mouth  Balance/Neuromuscular Re-Education  NMR 1: PROM shoulder x10 reps flexion, abduction, er  NMR 2: supine elbow flex/ext 2# 2/10  NMR 3: dowel AAROM 1# dowel 2/10  NMR 4: air cast donned scap stabilizations 2/5 x full 10 sec hold, circles 2/10  NMR 5: pnf in supine D1 (orange band)/D2 flex/ext x20 each  NMR 6: UE ranger sidelying FF x20 1# wrist wt, forward  reach x20 (held)  NMR 7: scap squeeze (orange band) /shrug x20 each (held)  NMR 8: bilateral hand use chest press with blue ball 3/10, trunk twists with ball 2/10 (held)  NMR 9: weight bearing physioball L over R x6 x10 sec hold  NMR 10: handwriting tasks x2 word using built up handle on highlighter    Time Entry(in minutes):  Neuromuscular Re-Education Time Entry: 34  Self Care/Home Management (ADLs) Time Entry: 10    Assessment & Plan   Assessment: Patient tolerated treatment well. Incorporated more hand activities today simulating self care/feeding tasks. He did well requiring minimal assist to complete tasks. Will continue to advance activities within patient tolerance.   Evaluation/Treatment Tolerance: Patient tolerated treatment well    The patient will continue to benefit from skilled outpatient occupational therapy in order to address the deficits listed in the problem list on the initial evaluation, provide patient and family education, and maximize the patients level of independence in the home and community environments.     The patient's spiritual, cultural, and educational needs were considered, and the patient is agreeable to the plan of care and goals.           Plan: continue per initial plan of care    Goals:   Active       LTG       IND with HEP (Progressing)       Start:  07/10/25    Expected End:  09/05/25            Pt to increase R UE AROM by 25 degrees in order to A in UB dressing by dc (Progressing)       Start:  07/10/25    Expected End:  09/05/25            Patient will use his R arm to assist ~50% in at least 2 functional tasks during sessions to promote meaningful limb use by discharge. (Progressing)       Start:  07/10/25    Expected End:  09/05/25            Patient will increase gross grasp in order to assist with holding utensil, jar for stabilization, generalized functional use of right hand by discharge.  (Progressing)       Start:  07/10/25    Expected End:  09/05/25             Patient will be able to achieve less than or equal to 50% on the FOTO, demonstrating overall improved functional ability with upper extremity.  (Self-care category)  (Progressing)       Start:  07/10/25    Expected End:  09/05/25               STG       Initiate HEP (Met)       Start:  07/10/25    Expected End:  08/08/25    Resolved:  07/14/25         Pt to increase right UE AROM by 10 degrees in order to A in UB dressing by 4 weeks. (Progressing)       Start:  07/10/25    Expected End:  08/08/25            Patient will use his RUE to assist in 2 functional tasks ( pushing open a door, gross placement on a counter to steady items, etc) during each therapy session, with either the left hand supporting or guiding by 4 weeks. (Progressing)       Start:  07/10/25    Expected End:  08/08/25            Patient will increase fine motor coordination as demonstrated by digital opposition to IF, LF, RF in order to assist with manipulating utensils for self feeding by 4 weeks. (Progressing)       Start:  07/10/25    Expected End:  08/08/25            Patient will be able to achieve less than or equal to 75% on the FOTO, demonstrating overall improved functional ability with upper extremity.  (Self-care category)  (Progressing)       Start:  07/10/25    Expected End:  09/05/25                Che Dyer OT

## 2025-07-28 NOTE — PROGRESS NOTES
Outpatient Rehab    Occupational Therapy Visit    Patient Name: Drew Kaur  MRN: [id number]  YOB: 1965  Encounter Date: 7/25/2025    Therapy Diagnosis:   Encounter Diagnoses   Name Primary?    Decreased activities of daily living (ADL) Yes    Decreased functional mobility     Right arm weakness     Right hand weakness      Physician: Mariela Newby MD    Physician Orders: Eval and Treat  Medical Diagnosis: CVA (cerebral vascular accident)  Impaired mobility and ADLs  Seizure disorder  Surgical Diagnosis: Not applicable for this Episode   Surgical Date: Not applicable for this Episode  Days Since Last Surgery: Not applicable for this Episode    Visit # / Visits Authorized: 4 / 10  Insurance Authorization Period: 7/9/2025 to 12/31/2025  Date of Evaluation: 7/9/2025  Plan of Care Certification: 7/11/2025 to 9/5/2025      Time In: 0228   Time Out: 0313  Total Time (in minutes): 45   Total Billable Time (in minutes): 45    FOTO:  Intake Score (%): Not applicable for this Episode  Survey Score 2 (%): Not applicable for this Episode  Survey Score 3 (%): Not applicable for this Episode    Precautions:       Subjective   Pt reports he is doing good.         Objective            Treatment:  Therapeutic Activity  TA 1: STANDING functional PNF pattern grasping cones on left side of body using R hand, squatting to place cones below knee levels on R side  TA 2: bilateral hand use reaction time activity using tennis ball x4 min; drop with left, attempt to catch (react) with R (not performed today)  TA 3: digit opposition with Patricia to perform  TA 4: passive place with active holds x3 digital flexion x3 digital extension  Self Care and ADLs  ADL 1: Pt stood at waist height (or slightly above) and folded 8 washcloths using B hands to grasp, reach, coordinate, and stack each cloth with supervision/min cues as needed  Balance/Neuromuscular Re-Education  NMR 1: PROM shoulder x10 reps flexion, abduction,  er  NMR 2: supine elbow flex/ext 2# 2/10  NMR 3: dowel AAROM 1# dowel 2/10  NMR 4: air cast donned scap stabilizations 3/3 x full 10 sec hold, circles 2/10  NMR 5: pnf in supine D1/D2 flex/ext x20 each 0 wt  NMR 6: UE ranger sidelying FF x20 1# wrist wt, forward reach x20  NMR 7: scap squeeze (orange band) /shrug x20 each  NMR 8: bilateral hand use chest press with blue ball 3/10, trunk twists with ball 2/10  NMR 9: weight bearing physioball L over R x6 x10 sec hold (held)    Time Entry(in minutes):  Neuromuscular Re-Education Time Entry: 25  Self Care/Home Management (ADLs) Time Entry: 8  Therapeutic Activity Time Entry: 12    Assessment & Plan   Assessment: Patient tolerated treatment well. He demonstrates improvements with stabilization exercises. Integrated two standing tolerance activities today incorporating self care tasks and PNF patterns with functional movement. He is improving overall and has good tolerance to actvities. Will progress as tolerable.   Evaluation/Treatment Tolerance: Patient tolerated treatment well    The patient will continue to benefit from skilled outpatient occupational therapy in order to address the deficits listed in the problem list on the initial evaluation, provide patient and family education, and maximize the patients level of independence in the home and community environments.     The patient's spiritual, cultural, and educational needs were considered, and the patient is agreeable to the plan of care and goals.           Plan: continue per initial plan of care    Goals:   Active       LTG       IND with HEP (Progressing)       Start:  07/10/25    Expected End:  09/05/25            Pt to increase R UE AROM by 25 degrees in order to A in UB dressing by dc (Progressing)       Start:  07/10/25    Expected End:  09/05/25            Patient will use his R arm to assist ~50% in at least 2 functional tasks during sessions to promote meaningful limb use by discharge. (Progressing)        Start:  07/10/25    Expected End:  09/05/25            Patient will increase gross grasp in order to assist with holding utensil, jar for stabilization, generalized functional use of right hand by discharge.  (Progressing)       Start:  07/10/25    Expected End:  09/05/25            Patient will be able to achieve less than or equal to 50% on the FOTO, demonstrating overall improved functional ability with upper extremity.  (Self-care category)  (Progressing)       Start:  07/10/25    Expected End:  09/05/25               STG       Initiate HEP (Met)       Start:  07/10/25    Expected End:  08/08/25    Resolved:  07/14/25         Pt to increase right UE AROM by 10 degrees in order to A in UB dressing by 4 weeks. (Progressing)       Start:  07/10/25    Expected End:  08/08/25            Patient will use his RUE to assist in 2 functional tasks ( pushing open a door, gross placement on a counter to steady items, etc) during each therapy session, with either the left hand supporting or guiding by 4 weeks. (Progressing)       Start:  07/10/25    Expected End:  08/08/25            Patient will increase fine motor coordination as demonstrated by digital opposition to IF, LF, RF in order to assist with manipulating utensils for self feeding by 4 weeks. (Progressing)       Start:  07/10/25    Expected End:  08/08/25            Patient will be able to achieve less than or equal to 75% on the FOTO, demonstrating overall improved functional ability with upper extremity.  (Self-care category)  (Progressing)       Start:  07/10/25    Expected End:  09/05/25                Che Dyer OT

## 2025-07-29 ENCOUNTER — OFFICE VISIT (OUTPATIENT)
Dept: FAMILY MEDICINE | Facility: CLINIC | Age: 60
End: 2025-07-29
Payer: COMMERCIAL

## 2025-07-29 VITALS
OXYGEN SATURATION: 97 % | WEIGHT: 150.19 LBS | DIASTOLIC BLOOD PRESSURE: 78 MMHG | HEIGHT: 70 IN | SYSTOLIC BLOOD PRESSURE: 120 MMHG | HEART RATE: 80 BPM | BODY MASS INDEX: 21.5 KG/M2

## 2025-07-29 DIAGNOSIS — Z86.73 H/O: CVA (CEREBROVASCULAR ACCIDENT): ICD-10-CM

## 2025-07-29 DIAGNOSIS — R26.89 IMPAIRED GAIT AND MOBILITY: ICD-10-CM

## 2025-07-29 DIAGNOSIS — I10 PRIMARY HYPERTENSION: Primary | ICD-10-CM

## 2025-07-29 DIAGNOSIS — R26.89 DECREASED FUNCTIONAL MOBILITY: ICD-10-CM

## 2025-07-29 DIAGNOSIS — R29.898 RIGHT ARM WEAKNESS: ICD-10-CM

## 2025-07-29 DIAGNOSIS — K21.9 GASTROESOPHAGEAL REFLUX DISEASE WITHOUT ESOPHAGITIS: ICD-10-CM

## 2025-07-29 PROBLEM — I63.02 STROKE DUE TO THROMBOSIS OF BASILAR ARTERY: Status: RESOLVED | Noted: 2025-06-10 | Resolved: 2025-07-29

## 2025-07-29 PROBLEM — I63.9 STROKE: Status: RESOLVED | Noted: 2025-06-09 | Resolved: 2025-07-29

## 2025-07-29 PROCEDURE — 3008F BODY MASS INDEX DOCD: CPT | Mod: S$GLB,,, | Performed by: FAMILY MEDICINE

## 2025-07-29 PROCEDURE — 1159F MED LIST DOCD IN RCRD: CPT | Mod: S$GLB,,, | Performed by: FAMILY MEDICINE

## 2025-07-29 PROCEDURE — 3074F SYST BP LT 130 MM HG: CPT | Mod: S$GLB,,, | Performed by: FAMILY MEDICINE

## 2025-07-29 PROCEDURE — 3078F DIAST BP <80 MM HG: CPT | Mod: S$GLB,,, | Performed by: FAMILY MEDICINE

## 2025-07-29 PROCEDURE — G2211 COMPLEX E/M VISIT ADD ON: HCPCS | Mod: S$GLB,,, | Performed by: FAMILY MEDICINE

## 2025-07-29 PROCEDURE — 99999 PR PBB SHADOW E&M-EST. PATIENT-LVL IV: CPT | Mod: PBBFAC,,, | Performed by: FAMILY MEDICINE

## 2025-07-29 PROCEDURE — 99204 OFFICE O/P NEW MOD 45 MIN: CPT | Mod: S$GLB,,, | Performed by: FAMILY MEDICINE

## 2025-07-29 PROCEDURE — 3044F HG A1C LEVEL LT 7.0%: CPT | Mod: S$GLB,,, | Performed by: FAMILY MEDICINE

## 2025-07-29 RX ORDER — ATORVASTATIN CALCIUM 80 MG/1
80 TABLET, FILM COATED ORAL DAILY
COMMUNITY
Start: 2025-07-03

## 2025-07-29 RX ORDER — ESCITALOPRAM OXALATE 10 MG/1
10 TABLET ORAL DAILY
COMMUNITY
Start: 2025-07-02

## 2025-07-29 RX ORDER — CLOPIDOGREL BISULFATE 75 MG/1
75 TABLET ORAL DAILY
COMMUNITY
Start: 2025-07-03

## 2025-07-29 NOTE — ASSESSMENT & PLAN NOTE
-secondary to CVA, independent with ADLs, previously a garcia.  -continue to ambulate with assistive device and right foot AFO  -continue OT/PT

## 2025-07-29 NOTE — ASSESSMENT & PLAN NOTE
-follows Neurology  -continue OT/PT/[facility]  -continue high-intensity statin  -continue Plavix 75 mg daily

## 2025-07-29 NOTE — PROGRESS NOTES
"[facility]      Subjective         Chief Complaint   Patient presents with    Establish Care      Pleasant 60-year-old  male presents to establish/PCP.  Patient has known recent history of left-sided CVA with resultant right upper and lower extremity weakness, HLD, chronic anticoagulation, anxiety, seizure, GERD, alcohol use.  Medical histories are reviewed.  Patient now completing OT/PT/ST outpatient [facility].  Patient continues to have moderate weakness in the right upper and lower extremity requiring right AFO, quad cane, and assistance with ADLs.  Here with spouse.  Follows with Dr. Mensah [facility]    Past Medical History:   Diagnosis Date    GERD (gastroesophageal reflux disease)     Hypertension     Seizures         Past Surgical History:   Procedure Laterality Date    EYE SURGERY          Review of patient's allergies indicates:  No Known Allergies     Review of Systems   Constitutional: Negative.    HENT: Negative.     Eyes: Negative.    Respiratory: Negative.     Cardiovascular: Negative.    Gastrointestinal: Negative.    Genitourinary: Negative.    Musculoskeletal: Negative.    Skin: Negative.    Neurological:  Positive for seizures and weakness. Negative for dizziness, tingling and headaches.        As above otherwise negative   Endo/Heme/Allergies: Negative.    Psychiatric/Behavioral: Negative.  Negative for depression.                Objective      Vitals:    07/29/25 0950   BP: 120/78   BP Location: Left arm   Patient Position: Sitting   Pulse: 80   SpO2: 97%   Weight: 68.1 kg (150 lb 3.2 oz)   Height: 5' 10" (1.778 m)        Physical Exam  Vitals reviewed.   Constitutional:       Appearance: Normal appearance. He is not ill-appearing.   HENT:      Head: Normocephalic and atraumatic.      Right Ear: Tympanic membrane and ear canal normal.      Left Ear: Tympanic membrane and ear canal normal.      Ears:      Comments: Nonobstructive cerumen " bilaterally     Nose: Nose normal.      Mouth/Throat:      Mouth: Mucous membranes are moist.      Pharynx: Oropharynx is clear.   Eyes:      Extraocular Movements: Extraocular movements intact.      Conjunctiva/sclera: Conjunctivae normal.   Cardiovascular:      Rate and Rhythm: Normal rate and regular rhythm.      Pulses: Normal pulses.   Pulmonary:      Effort: Pulmonary effort is normal.      Breath sounds: Normal breath sounds.   Abdominal:      General: Abdomen is flat.      Palpations: Abdomen is soft. There is no mass.      Tenderness: There is no abdominal tenderness.   Musculoskeletal:         General: Swelling present. No tenderness. Normal range of motion.      Cervical back: Neck supple. No tenderness.      Comments: Right hand: Dorsal edema extending from wrist to IP joints of fingers 2-5.  Full flexion-extension.   strength 2/5 biceps 2/5 triceps 2/5    Right lower extremity:  No edema.  AFO intact.  Neurovascularly intact distally.  Weakness at the knee upon resisted flexion-extension.    Ambulates independently with quad cane   Lymphadenopathy:      Cervical: No cervical adenopathy.   Skin:     General: Skin is warm and dry.      Capillary Refill: Capillary refill takes less than 2 seconds.   Neurological:      General: No focal deficit present.      Mental Status: He is alert.      Comments: Speech clear thoughts coherent and tangential, inappropriate laughing.  Mild right labial ptosis.  Tongue protrudes midline soft palate elevates midline.         Lab Results   Component Value Date    TSH 2.805 06/09/2025    TSH 2.825 06/03/2022    TSH 1.927 09/13/2021        Lab Results   Component Value Date    HGBA1C 5.2 06/09/2025    HGBA1C 5.3 07/05/2022      Lab Results   Component Value Date    HGBA1C 5.2 06/09/2025    HGBA1C 5.3 07/05/2022     CMP  Sodium   Date Value Ref Range Status   06/27/2025 140 136 - 145 mmol/L Final   06/20/2025 142 136 - 145 mmol/L Final   06/17/2025 142 136 - 145 mmol/L  Final     Potassium   Date Value Ref Range Status   06/27/2025 4.0 3.5 - 5.1 mmol/L Final   06/20/2025 3.5 3.5 - 5.1 mmol/L Final   06/17/2025 3.0 (L) 3.5 - 5.1 mmol/L Final     Chloride   Date Value Ref Range Status   06/27/2025 104 95 - 110 mmol/L Final   06/20/2025 103 95 - 110 mmol/L Final   06/17/2025 102 95 - 110 mmol/L Final     CO2   Date Value Ref Range Status   06/27/2025 28 23 - 29 mmol/L Final   06/20/2025 28 23 - 29 mmol/L Final   06/17/2025 31 (H) 23 - 29 mmol/L Final     Glucose   Date Value Ref Range Status   06/27/2025 89 70 - 110 mg/dL Final   06/20/2025 83 70 - 110 mg/dL Final   06/17/2025 87 70 - 110 mg/dL Final     BUN   Date Value Ref Range Status   06/27/2025 13 6 - 20 mg/dL Final   06/20/2025 10 6 - 20 mg/dL Final   06/17/2025 12 6 - 20 mg/dL Final   06/11/2025 8 6 - 20 mg/dL Final   06/10/2025 10 6 - 20 mg/dL Final   06/09/2025 13 6 - 20 mg/dL Final     Creatinine   Date Value Ref Range Status   06/27/2025 0.9 0.5 - 1.4 mg/dL Final   06/20/2025 0.8 0.5 - 1.4 mg/dL Final   06/17/2025 0.8 0.5 - 1.4 mg/dL Final   06/11/2025 0.8 0.5 - 1.4 mg/dL Final   06/10/2025 0.9 0.5 - 1.4 mg/dL Final   06/09/2025 0.9 0.5 - 1.4 mg/dL Final     Calcium   Date Value Ref Range Status   06/27/2025 9.5 8.7 - 10.5 mg/dL Final   06/20/2025 9.2 8.7 - 10.5 mg/dL Final   06/17/2025 9.0 8.7 - 10.5 mg/dL Final     Protein Total   Date Value Ref Range Status   06/12/2025 6.6 6.0 - 8.4 gm/dL Final   06/11/2025 6.6 6.0 - 8.4 gm/dL Final   06/10/2025 6.6 6.0 - 8.4 gm/dL Final   06/09/2025 6.9 6.0 - 8.4 gm/dL Final     Total Protein   Date Value Ref Range Status   08/19/2024 8.0 6.0 - 8.4 g/dL Final   07/05/2022 6.9 6.0 - 8.4 g/dL Final   06/03/2022 8.4 6.0 - 8.4 g/dL Final     Albumin   Date Value Ref Range Status   06/12/2025 3.5 3.5 - 5.2 g/dL Final   06/11/2025 3.4 (L) 3.5 - 5.2 g/dL Final   06/10/2025 3.5 3.5 - 5.2 g/dL Final   06/09/2025 3.5 3.5 - 5.2 g/dL Final   08/19/2024 4.3 3.5 - 5.2 g/dL Final   07/05/2022 3.7  3.5 - 5.2 g/dL Final   06/03/2022 4.1 3.5 - 5.2 g/dL Final     Total Bilirubin   Date Value Ref Range Status   08/19/2024 0.6 0.1 - 1.0 mg/dL Final     Comment:     For infants and newborns, interpretation of results should be based  on gestational age, weight and in agreement with clinical  observations.    Premature Infant recommended reference ranges:  Up to 24 hours.............<8.0 mg/dL  Up to 48 hours............<12.0 mg/dL  3-5 days..................<15.0 mg/dL  6-29 days.................<15.0 mg/dL     07/05/2022 0.5 0.1 - 1.0 mg/dL Final     Comment:     For infants and newborns, interpretation of results should be based  on gestational age, weight and in agreement with clinical  observations.    Premature Infant recommended reference ranges:  Up to 24 hours.............<8.0 mg/dL  Up to 48 hours............<12.0 mg/dL  3-5 days..................<15.0 mg/dL  6-29 days.................<15.0 mg/dL     06/03/2022 0.8 0.1 - 1.0 mg/dL Final     Comment:     For infants and newborns, interpretation of results should be based  on gestational age, weight and in agreement with clinical  observations.    Premature Infant recommended reference ranges:  Up to 24 hours.............<8.0 mg/dL  Up to 48 hours............<12.0 mg/dL  3-5 days..................<15.0 mg/dL  6-29 days.................<15.0 mg/dL       Bilirubin Total   Date Value Ref Range Status   06/12/2025 0.7 0.1 - 1.0 mg/dL Final     Comment:     For infants and newborns, interpretation of results should be based   on gestational age, weight and in agreement with clinical   observations.    Premature Infant recommended reference ranges:   0-24 hours:  <8.0 mg/dL   24-48 hours: <12.0 mg/dL   3-5 days:    <15.0 mg/dL   6-29 days:   <15.0 mg/dL   06/11/2025 0.8 0.1 - 1.0 mg/dL Final     Comment:     For infants and newborns, interpretation of results should be based   on gestational age, weight and in agreement with clinical   observations.    Premature  Infant recommended reference ranges:   0-24 hours:  <8.0 mg/dL   24-48 hours: <12.0 mg/dL   3-5 days:    <15.0 mg/dL   6-29 days:   <15.0 mg/dL   06/10/2025 0.9 0.1 - 1.0 mg/dL Final     Comment:     For infants and newborns, interpretation of results should be based   on gestational age, weight and in agreement with clinical   observations.    Premature Infant recommended reference ranges:   0-24 hours:  <8.0 mg/dL   24-48 hours: <12.0 mg/dL   3-5 days:    <15.0 mg/dL   6-29 days:   <15.0 mg/dL   06/09/2025 0.6 0.1 - 1.0 mg/dL Final     Comment:     For infants and newborns, interpretation of results should be based   on gestational age, weight and in agreement with clinical   observations.    Premature Infant recommended reference ranges:   0-24 hours:  <8.0 mg/dL   24-48 hours: <12.0 mg/dL   3-5 days:    <15.0 mg/dL   6-29 days:   <15.0 mg/dL     Alkaline Phosphatase   Date Value Ref Range Status   08/19/2024 130 55 - 135 U/L Final   07/05/2022 85 55 - 135 U/L Final   06/03/2022 145 (H) 55 - 135 U/L Final     ALP   Date Value Ref Range Status   06/12/2025 126 40 - 150 unit/L Final   06/11/2025 137 40 - 150 unit/L Final   06/10/2025 145 40 - 150 unit/L Final   06/09/2025 116 40 - 150 unit/L Final     AST   Date Value Ref Range Status   06/12/2025 32 11 - 45 unit/L Final   06/11/2025 19 11 - 45 unit/L Final   06/10/2025 19 11 - 45 unit/L Final   06/09/2025 26 11 - 45 unit/L Final   08/19/2024 38 10 - 40 U/L Final   07/05/2022 17 10 - 40 U/L Final   06/03/2022 43 (H) 10 - 40 U/L Final     ALT   Date Value Ref Range Status   06/12/2025 17 10 - 44 unit/L Final   06/11/2025 15 10 - 44 unit/L Final   06/10/2025 17 10 - 44 unit/L Final   06/09/2025 20 10 - 44 unit/L Final   08/19/2024 27 10 - 44 U/L Final   07/05/2022 25 10 - 44 U/L Final   06/03/2022 41 10 - 44 U/L Final     Anion Gap   Date Value Ref Range Status   06/27/2025 8 8 - 16 mmol/L Final   06/20/2025 11 8 - 16 mmol/L Final   06/17/2025 9 8 - 16 mmol/L Final    06/11/2025 12 8 - 16 mmol/L Final   06/10/2025 10 8 - 16 mmol/L Final   06/09/2025 15 8 - 16 mmol/L Final     eGFR if    Date Value Ref Range Status   07/05/2022 >60.0 >60 mL/min/1.73 m^2 Final   06/03/2022 58.9 (A) >60 mL/min/1.73 m^2 Final   09/13/2021 >60.0 >60 mL/min/1.73 m^2 Final     eGFR if non    Date Value Ref Range Status   07/05/2022 >60.0 >60 mL/min/1.73 m^2 Final     Comment:     Calculation used to obtain the estimated glomerular filtration  rate (eGFR) is the CKD-EPI equation.      06/03/2022 50.9 (A) >60 mL/min/1.73 m^2 Final     Comment:     Calculation used to obtain the estimated glomerular filtration  rate (eGFR) is the CKD-EPI equation.      09/13/2021 >60.0 >60 mL/min/1.73 m^2 Final     Comment:     Calculation used to obtain the estimated glomerular filtration  rate (eGFR) is the CKD-EPI equation.         Lab Results   Component Value Date    CHOL 163 06/09/2025    CHOL 141 07/05/2022    CHOL 248 (H) 06/03/2022     Lab Results   Component Value Date    HDL 90 (H) 06/09/2025    HDL 61 07/05/2022    HDL >140 (H) 06/03/2022     Lab Results   Component Value Date    LDLCALC 40.6 (L) 06/09/2025    LDLCALC 48.8 (L) 07/05/2022    LDLCALC Unable to calculate 06/03/2022     Lab Results   Component Value Date    TRIG 162 (H) 06/09/2025    TRIG 156 (H) 07/05/2022    TRIG 91 06/03/2022     Lab Results   Component Value Date    CHOLHDL 55.2 (H) 06/09/2025    CHOLHDL 43.3 07/05/2022    CHOLHDL Unable to calculate 06/03/2022          Medication List with Changes/Refills   Current Medications    ATORVASTATIN (LIPITOR) 80 MG TABLET    Take 80 mg by mouth once daily.    CHLORDIAZEPOXIDE (LIBRIUM) 25 MG CAP    Take 1 capsule (25 mg total) by mouth 2 (two) times a day.    CLOPIDOGREL (PLAVIX) 75 MG TABLET    Take 75 mg by mouth once daily.    ESCITALOPRAM OXALATE (LEXAPRO) 10 MG TABLET    Take 10 mg by mouth once daily.    LEVETIRACETAM (KEPPRA) 500 MG TAB    Take 500 mg by mouth  2 (two) times daily. Take one tablet in am and 2 tablets at night    MULTIVITAMIN TAB    1 tablet by Per G Tube route.    PANTOPRAZOLE (PROTONIX) 40 MG TABLET        POTASSIUM CHLORIDE (MICRO-K) 10 MEQ CPSR    Take 10 mEq by mouth once daily.   Discontinued Medications    AMLODIPINE (NORVASC) 2.5 MG TABLET    Take 5 mg by mouth once daily.    ASPIRIN (ECOTRIN) 81 MG EC TABLET    Take 81 mg by mouth once daily.           Assessment & Plan     Assessment & Plan  Primary hypertension  -ample control  -home monitoring  -dash diet         Gastroesophageal reflux disease without esophagitis  -continue Pantoprazole 40 mg/d  -foods and modulate GE junction sphincter integrity reviewed         H/O: CVA (cerebrovascular accident)  -follows Neurology  -continue OT/PT/AST  -continue high-intensity statin  -continue Plavix 75 mg daily         Right arm weakness  -continue OT         Decreased functional mobility  -secondary to CVA, independent with ADLs, previously a garcia.  -continue to ambulate with assistive device and right foot AFO  -continue OT/PT         Impaired gait and mobility  -secondary to CVA, independent with ADLs, previously a garcia.  -continue to ambulate with assistive device and right foot AFO  -continue OT/PT        Plan of care reviewed with the patient all questions answered he understands and elects to proceed.  Follow up 3 months post CVA checkup     Christopher L Jones, MD [facility]  [address] [place] [address],[place] [id number] [phone]

## 2025-07-30 ENCOUNTER — CLINICAL SUPPORT (OUTPATIENT)
Dept: REHABILITATION | Facility: HOSPITAL | Age: 60
End: 2025-07-30
Payer: COMMERCIAL

## 2025-07-30 DIAGNOSIS — Z78.9 DECREASED ACTIVITIES OF DAILY LIVING (ADL): Primary | ICD-10-CM

## 2025-07-30 DIAGNOSIS — R26.89 DECREASED FUNCTIONAL MOBILITY: ICD-10-CM

## 2025-07-30 DIAGNOSIS — R26.89 IMPAIRED GAIT AND MOBILITY: Primary | ICD-10-CM

## 2025-07-30 DIAGNOSIS — R29.898 RIGHT ARM WEAKNESS: ICD-10-CM

## 2025-07-30 DIAGNOSIS — R29.898 RIGHT HAND WEAKNESS: ICD-10-CM

## 2025-07-30 DIAGNOSIS — Z86.73 H/O: CVA (CEREBROVASCULAR ACCIDENT): Primary | ICD-10-CM

## 2025-07-30 PROCEDURE — 97112 NEUROMUSCULAR REEDUCATION: CPT

## 2025-07-30 PROCEDURE — 92507 TX SP LANG VOICE COMM INDIV: CPT

## 2025-07-30 PROCEDURE — 97530 THERAPEUTIC ACTIVITIES: CPT

## 2025-07-30 PROCEDURE — 97110 THERAPEUTIC EXERCISES: CPT

## 2025-07-30 NOTE — PROGRESS NOTES
Outpatient Rehab    Speech-Language Pathology Visit    Patient Name: Branden Kaur  MRN: [id number]  YOB: 1965  Encounter Date: 7/30/2025    Therapy Diagnosis:   Encounter Diagnosis   Name Primary?    H/O: CVA (cerebrovascular accident) Yes     Physician: Mariela Newby MD    Physician Orders: Eval and Treat  Medical Diagnosis: CVA (cerebral vascular accident)  Impaired mobility and ADLs  Seizure disorder  Surgical Diagnosis: Not applicable for this Episode   Surgical Date: Not applicable for this Episode  Days Since Last Surgery: Not applicable for this Episode    Visit # / Visits Authorized: 3 / 10   Insurance Authorization Period: 7/9/2025 to 12/31/2025  Date of Evaluation: 7/9/2025   Plan of Care Certification: 7/9/2025 to 9/3/2025      Time In: 1545   Time Out: 1630  Total Time (in minutes): 45   Total Billable Time (in minutes): 45         Subjective   Patient agreeable and cooperative during the session. Wife remained in waiting room. He reports no changes in communication or cognitive status. Patient reports continued compliance with homework as assigned and reading one chapter per day out loud with compensatory speech strategies. Denies any swallowing difficulties or concerns. Bottom dentures in place during session..           Objective            Treatment  Speech  Activity 1: Articulation hiearchy drill work (sh/ch/sk blends)  Activity 2: Paragraph reading  Activity 3: Self assessment  Activity 4: Review and use of compensatory strategies    Time Entry(in minutes):  Speech Treatment (Individual) Time Entry: 45    Assessment & Plan   Assessment  Patient improved articulatory precision in targeted speech sounds (/sh/, /ch/, sk blends) to increase speech intelligibility to at least 90% during structured tasks, as measured by clinician ratings. Patient implemented compensatory communication strategies (e.g., slowing rate, over-articulation) to enhance intelligibility during spontaneous  speech with minimal verbal clinician cues. Patient has maintained safe swallowing by consistently employing compensatory strategies during oral intake, as reported by patient.  Evaluation/Treatment Tolerance: Patient tolerated treatment well    The patient will continue to benefit from skilled outpatient speech therapy in order to address the deficits listed in the problem list on the initial evaluation, provide patient and family education, and maximize the patients level of independence in the home and community environments.     The patient's spiritual, cultural, and educational needs were considered, and the patient is agreeable to the plan of care and goals.     Education  Education was done with Patient. The patient's learning style includes Listening and Demonstration. The patient Demonstrates understanding.         Patient to continue practicing AMR/SMR, articulatory hierarchy drills, scripted conversation, and paragraphs targeting speech sounds addressed in therapy. Patient is to implement s/z cues (retracting lips) and articulatory compensatory strategies (slow rate, over articulate). Patient is to implement self rating scale (1-5) with goal of average score of 4 during daily practice. During daily practice, patient is to utilize mirror to monitor facial and labial symmetry when practicing (sh, delmi, sk) and all other assigned speech drills.         Plan  Continue current plan of care. Patient is to continue wearing bottom dentures during next treatment session.  Diet Recommendation: Oral diet  Slow feeding rate, small single bites/sips    Goals:   Active       Long Term Goals       1. Patient will achieve functional speech intelligibility of 95% or higher during conversational speech with unfamiliar listeners in clinical and community settings. (Progressing)       Start:  07/09/25    Expected End:  09/03/25            2. Patient will demonstrate independent use of self-monitoring techniques and  communication repair strategies to maintain effective communication. (Progressing)       Start:  07/09/25    Expected End:  09/03/25            3. Patient will continue to maintain safe swallowing status with no signs of aspiration or compensatory strategy breakdown. (Met)       Start:  07/09/25    Expected End:  09/03/25    Resolved:  07/30/25            Short Term Goals        1. Patient will improve articulatory precision in targeted speech sounds (/s/, /z/, /ch, sh/) and consonant blends to increase speech intelligibility to at least 90% during structured tasks, as measured by clinician ratings. (Progressing)       Start:  07/09/25    Expected End:  09/03/25            2. Patient will demonstrate improved coordination of alternating oral movements (e.g., AMRs and SMRs) with reduced effort and increased accuracy in 4 out of 5 trials. (Met)       Start:  07/09/25    Expected End:  09/03/25    Resolved:  07/25/25         3. Patient will implement compensatory communication strategies (e.g., slowing rate, over-articulation) to enhance intelligibility during spontaneous speech with minimal clinician cues. (Progressing)       Start:  07/09/25    Expected End:  09/03/25            4. Patient will maintain safe swallowing by consistently employing compensatory strategies during oral intake, as reported by patient and observed by clinician. (Met)       Start:  07/09/25    Expected End:  09/03/25    Resolved:  07/30/25             Leigha Kate CCC-SLP

## 2025-07-30 NOTE — PROGRESS NOTES
Outpatient Rehab    Physical Therapy Visit    Patient Name: Branden Kaur  MRN: [id number]  YOB: 1965  Encounter Date: 7/30/2025    Therapy Diagnosis:   Encounter Diagnosis   Name Primary?    Impaired gait and mobility Yes     Physician: Mariela Newby MD    Physician Orders: Eval and Treat  Medical Diagnosis: CVA (cerebral vascular accident)  Impaired mobility and ADLs  Seizure disorder  Surgical Diagnosis: Not applicable for this Episode   Surgical Date: Not applicable for this Episode  Days Since Last Surgery: Not applicable for this Episode    Visit # / Visits Authorized:  1 / 10  Insurance Authorization Period: 7/9/2025 to 12/31/2025  Date of Evaluation: 7/22/2025  Plan of Care Certification:       PT/PTA:     Number of PTA visits since last PT visit:   Time In: 1330   Time Out: 1425  Total Time (in minutes): 55   Total Billable Time (in minutes):      FOTO:  Intake Score (%): Not applicable for this Episode  Survey Score 2 (%): Not applicable for this Episode  Survey Score 3 (%): Not applicable for this Episode    Precautions:         Subjective   Doing well at this time; denies adverse response following last treatment..         Objective            Treatment:  Therapeutic Exercise  TE 1: Sci fit 10 min level 2.5  Balance/Neuromuscular Re-Education  NMR 1: Hurdles fwd x 5  NMR 2: Hurdles 5 lateral  NMR 3: Large step taps all directions 3 x 10  Therapeutic Activity  TA 1: Sit to stands x 24  TA 2: Modified plank 10 seconds x 10    Time Entry(in minutes):  Neuromuscular Re-Education Time Entry: 25  Therapeutic Activity Time Entry: 20  Therapeutic Exercise Time Entry: 10    Assessment & Plan   Assessment: Challenged with all functional balance; progress as tolerated in OKC and CKC positions to improve R foot stability and clearance during gait.        The patient will continue to benefit from skilled outpatient physical therapy in order to address the deficits listed in the problem list on the  initial evaluation, provide patient and family education, and maximize the patients level of independence in the home and community environments.     The patient's spiritual, cultural, and educational needs were considered, and the patient is agreeable to the plan of care and goals.           Plan:      Goals:   Active       LTG       Pt will improve FOTO.        Start:  07/22/25    Expected End:  09/02/25            Pt will be able to ambulate with no AD unlimited distance.        Start:  07/22/25    Expected End:  09/02/25            Pt will improve functional mobility with no fear of falling.       Start:  07/22/25    Expected End:  09/02/25               STG       Pt will be independent with HEP.        Start:  07/22/25    Expected End:  08/12/25            Pt will be able to improve sit <> stand within 30 seconds to at least 15.        Start:  07/22/25    Expected End:  08/12/25            Pt will be able to go up and down stairs step over step.        Start:  07/22/25    Expected End:  08/12/25                Vicente Scott, PT

## 2025-07-31 NOTE — PROGRESS NOTES
Outpatient Rehab    Occupational Therapy Visit    Patient Name: Branden Kuar  MRN: [id number]  YOB: 1965  Encounter Date: 7/30/2025    Therapy Diagnosis:   Encounter Diagnoses   Name Primary?    Decreased activities of daily living (ADL) Yes    Decreased functional mobility     Right arm weakness     Right hand weakness      Physician: Mariela Newby MD    Physician Orders: Eval and Treat  Medical Diagnosis: CVA (cerebral vascular accident)  Impaired mobility and ADLs  Seizure disorder  Surgical Diagnosis: Not applicable for this Episode   Surgical Date: Not applicable for this Episode  Days Since Last Surgery: Not applicable for this Episode    Visit # / Visits Authorized: 6 / 10  Insurance Authorization Period: 7/9/2025 to 12/31/2025  Date of Evaluation: 7/9/2025  Plan of Care Certification: 7/11/2025 to 9/5/2025      Time In: 0237   Time Out: 0319  Total Time (in minutes): 42   Total Billable Time (in minutes): 42    FOTO:  Intake Score (%): Not applicable for this Episode  Survey Score 2 (%): Not applicable for this Episode  Survey Score 3 (%): Not applicable for this Episode    Precautions:       Subjective   Pt reports he is doing good. he presents wiht no cane today.         Objective            Treatment:  Therapeutic Activity  TA 1: STANDING functional PNF pattern grasping cones on left side of body using R hand, squatting to place cones below knee levels on R side  TA 2: STANDING functional PNF pattern grasping cones on left side of body squatting to reach cross body using R hand, standing to place cones above waist levels on R side  TA 3: digit opposition with Patricia to perform  TA 4: functional grasp on cone x10  TA 5: tip to tip coin grasp from therapist & placing coin into coin slot x10  Self Care and ADLs  ADL 1: Pt stood at waist height (or slightly above) and folded 8 washcloths using B hands to grasp, reach, coordinate, and stack each cloth with supervision/min cues as needed (not  performed today)  ADL 2: simulated meal prep/feeding tasks using built up utensils, putty, rice bin with active shoulder/elbow flex to bring utensil to mouth (not perfromed)  Balance/Neuromuscular Re-Education  NMR 1: PROM shoulder x10 reps flexion, abduction, er  NMR 2: supine elbow flex/ext 2# 2/10  NMR 3: dowel AAROM 1# dowel 2/10 seated with therapist acting as L arm due to RTC injury  NMR 4: air cast donned scap stabilizations 2/5 x full 10 sec hold, circles 2/10  NMR 5: pnf in supine D1 (orange band)/D2 flex/ext 2x10 each  NMR 6: quadriped on mat WB through RUE grasping x12 objects with L hand and placing into bucket in front; quadriped on mat WB through LUE using functional reach/grasp with RUE to  x12 objects and place into bucket at end of mat (held)  NMR 7: scap squeeze (orange band) /shrug x20 each  NMR 8: bilateral hand use chest press with blue ball 3/10, trunk twists with ball 2/10 (held)  NMR 9: weight bearing physioball L over R x6 x10 sec hold    Time Entry(in minutes):  Neuromuscular Re-Education Time Entry: 28  Therapeutic Activity Time Entry: 14    Assessment & Plan   Assessment: Patient will continue to benefit from skilled OT services. Integrated weight-bearing activities today with functional reach & grasp using both upper extremities. He tolerated very well. Perform standing PNF patterns with functional reach for cones. Tolerated very well. Following this we worked on functional grasp of cones. Also performed shoulder flexion against gravity today to further promote range of motion. Therapist assisted as left shoulder since left shoulder has rotator cuff injury & no increased irritation of left shoulder is desired. Will continue to advance as tolerable.  Evaluation/Treatment Tolerance: Patient tolerated treatment well    The patient will continue to benefit from skilled outpatient occupational therapy in order to address the deficits listed in the problem list on the initial  evaluation, provide patient and family education, and maximize the patients level of independence in the home and community environments.     The patient's spiritual, cultural, and educational needs were considered, and the patient is agreeable to the plan of care and goals.           Plan:      Goals:   Active       LTG       IND with HEP (Progressing)       Start:  07/10/25    Expected End:  09/05/25            Pt to increase R UE AROM by 25 degrees in order to A in UB dressing by dc (Progressing)       Start:  07/10/25    Expected End:  09/05/25            Patient will use his R arm to assist ~50% in at least 2 functional tasks during sessions to promote meaningful limb use by discharge. (Progressing)       Start:  07/10/25    Expected End:  09/05/25            Patient will increase gross grasp in order to assist with holding utensil, jar for stabilization, generalized functional use of right hand by discharge.  (Progressing)       Start:  07/10/25    Expected End:  09/05/25            Patient will be able to achieve less than or equal to 50% on the FOTO, demonstrating overall improved functional ability with upper extremity.  (Self-care category)  (Progressing)       Start:  07/10/25    Expected End:  09/05/25               STG       Initiate HEP (Met)       Start:  07/10/25    Expected End:  08/08/25    Resolved:  07/14/25         Pt to increase right UE AROM by 10 degrees in order to A in UB dressing by 4 weeks. (Progressing)       Start:  07/10/25    Expected End:  08/08/25            Patient will use his RUE to assist in 2 functional tasks ( pushing open a door, gross placement on a counter to steady items, etc) during each therapy session, with either the left hand supporting or guiding by 4 weeks. (Progressing)       Start:  07/10/25    Expected End:  08/08/25            Patient will increase fine motor coordination as demonstrated by digital opposition to IF, LF, RF in order to assist with manipulating  utensils for self feeding by 4 weeks. (Progressing)       Start:  07/10/25    Expected End:  08/08/25            Patient will be able to achieve less than or equal to 75% on the FOTO, demonstrating overall improved functional ability with upper extremity.  (Self-care category)  (Progressing)       Start:  07/10/25    Expected End:  09/05/25                Che Dyer, OT

## 2025-08-04 ENCOUNTER — CLINICAL SUPPORT (OUTPATIENT)
Dept: REHABILITATION | Facility: HOSPITAL | Age: 60
End: 2025-08-04
Payer: COMMERCIAL

## 2025-08-04 DIAGNOSIS — Z78.9 DECREASED ACTIVITIES OF DAILY LIVING (ADL): Primary | ICD-10-CM

## 2025-08-04 DIAGNOSIS — R29.898 RIGHT HAND WEAKNESS: ICD-10-CM

## 2025-08-04 DIAGNOSIS — R26.89 DECREASED FUNCTIONAL MOBILITY: ICD-10-CM

## 2025-08-04 DIAGNOSIS — R29.898 RIGHT ARM WEAKNESS: ICD-10-CM

## 2025-08-04 PROCEDURE — 97530 THERAPEUTIC ACTIVITIES: CPT

## 2025-08-04 PROCEDURE — 97535 SELF CARE MNGMENT TRAINING: CPT

## 2025-08-04 PROCEDURE — 97112 NEUROMUSCULAR REEDUCATION: CPT

## 2025-08-04 NOTE — PROGRESS NOTES
Outpatient Rehab    Occupational Therapy Visit    Patient Name: Branden Kaur  MRN: [id number]  YOB: 1965  Encounter Date: 8/4/2025    Therapy Diagnosis:   Encounter Diagnoses   Name Primary?    Decreased activities of daily living (ADL) Yes    Decreased functional mobility     Right arm weakness     Right hand weakness      Physician: Mariela Newby MD    Physician Orders: Eval and Treat  Medical Diagnosis: CVA (cerebral vascular accident)  Impaired mobility and ADLs  Seizure disorder  Surgical Diagnosis: Not applicable for this Episode   Surgical Date: Not applicable for this Episode  Days Since Last Surgery: Not applicable for this Episode    Visit # / Visits Authorized: 7 / 10  Insurance Authorization Period: 7/9/2025 to 12/31/2025  Date of Evaluation: 7/9/2025  Plan of Care Certification: 7/11/2025 to 9/5/2025      Time In: 0320   Time Out: 0403  Total Time (in minutes): 43   Total Billable Time (in minutes): 43    FOTO:  Intake Score (%): Not applicable for this Episode  Survey Score 2 (%): Not applicable for this Episode  Survey Score 3 (%): Not applicable for this Episode    Precautions:       Subjective   pt reports he did OT,PT, and speech therapy on his own yesterday.         Objective            Treatment:  Therapeutic Activity  TA 1: STANDING functional PNF pattern grasping cones on left side of body using R hand, squatting to place cones below knee levels on R side (held)  TA 2: STANDING functional PNF pattern grasping cones on left side of body squatting to reach cross body using R hand, standing to place cones above waist levels on R side (held)  TA 3: digit opposition  TA 4: functional grasp on cone x10 at table using R hand with stacking  TA 5: tip to tip coin grasp from therapist & placing coin into coin slot x10 (held)  Self Care and ADLs  ADL 1: Pt stood at waist height (or slightly above) and folded 8 washcloths using B hands to grasp, reach, coordinate, and stack each cloth  with supervision/min cues as needed (not performed today)  ADL 2: simulated meal prep/feeding tasks using built up utensils, putty, rice bin with active shoulder/elbow flex to bring utensil to mouth  Balance/Neuromuscular Re-Education  NMR 1: PROM shoulder x10 reps flexion, abduction, er  NMR 2: supine elbow flex/ext 2# 2/10  NMR 3: dowel AAROM 1# dowel 2/10 seated with therapist acting as L arm due to RTC injury  NMR 4: air cast donned scap stabilizations 2/5 x full 10 sec hold, circles 2/10, abduction 2/10  NMR 5: pnf in supine D1 (orange band)/D2 flex/ext 2x10 each  NMR 6: quadriped on mat WB through RUE grasping x12 objects with L hand and placing into bucket in front; quadriped on mat WB through LUE using functional reach/grasp with RUE to  x12 objects and place into bucket at end of mat (held)  NMR 7: scap squeeze (orange band) /shrug x20 each (held)  NMR 8: bilateral hand use chest press with blue ball 3/10, trunk twists with ball 2/10 (held)  NMR 9: weight bearing physioball L over R x6 x10 sec hold    Time Entry(in minutes):  Neuromuscular Re-Education Time Entry: 23  Self Care/Home Management (ADLs) Time Entry: 10  Therapeutic Activity Time Entry: 10    Assessment & Plan   Assessment: Patient would continue to benefit from skilled OT services. He tolerated treatment well today with noted improvements in range of motion and functional use of the hand. Required Patricia on occasion to complete ADL simulated feeding tasks. Issued orange theraband for supine PNF performance at home. Will progress as tolerable.  Evaluation/Treatment Tolerance: Patient tolerated treatment well    The patient will continue to benefit from skilled outpatient occupational therapy in order to address the deficits listed in the problem list on the initial evaluation, provide patient and family education, and maximize the patients level of independence in the home and community environments.     The patient's spiritual, cultural,  and educational needs were considered, and the patient is agreeable to the plan of care and goals.           Plan: continue per initial plan of care    Goals:   Active       LTG       IND with HEP (Progressing)       Start:  07/10/25    Expected End:  09/05/25            Pt to increase R UE AROM by 25 degrees in order to A in UB dressing by dc (Progressing)       Start:  07/10/25    Expected End:  09/05/25            Patient will use his R arm to assist ~50% in at least 2 functional tasks during sessions to promote meaningful limb use by discharge. (Progressing)       Start:  07/10/25    Expected End:  09/05/25            Patient will increase gross grasp in order to assist with holding utensil, jar for stabilization, generalized functional use of right hand by discharge.  (Progressing)       Start:  07/10/25    Expected End:  09/05/25            Patient will be able to achieve less than or equal to 50% on the FOTO, demonstrating overall improved functional ability with upper extremity.  (Self-care category)  (Progressing)       Start:  07/10/25    Expected End:  09/05/25               STG       Initiate HEP (Met)       Start:  07/10/25    Expected End:  08/08/25    Resolved:  07/14/25         Pt to increase right UE AROM by 10 degrees in order to A in UB dressing by 4 weeks. (Progressing)       Start:  07/10/25    Expected End:  08/08/25            Patient will use his RUE to assist in 2 functional tasks ( pushing open a door, gross placement on a counter to steady items, etc) during each therapy session, with either the left hand supporting or guiding by 4 weeks. (Progressing)       Start:  07/10/25    Expected End:  08/08/25            Patient will increase fine motor coordination as demonstrated by digital opposition to IF, LF, RF in order to assist with manipulating utensils for self feeding by 4 weeks. (Progressing)       Start:  07/10/25    Expected End:  08/08/25            Patient will be able to achieve  less than or equal to 75% on the FOTO, demonstrating overall improved functional ability with upper extremity.  (Self-care category)  (Progressing)       Start:  07/10/25    Expected End:  09/05/25                Che Dyer OT

## 2025-08-05 ENCOUNTER — CLINICAL SUPPORT (OUTPATIENT)
Dept: REHABILITATION | Facility: HOSPITAL | Age: 60
End: 2025-08-05
Payer: COMMERCIAL

## 2025-08-05 DIAGNOSIS — R26.89 IMPAIRED GAIT AND MOBILITY: Primary | ICD-10-CM

## 2025-08-05 PROCEDURE — 97110 THERAPEUTIC EXERCISES: CPT

## 2025-08-05 PROCEDURE — 97112 NEUROMUSCULAR REEDUCATION: CPT

## 2025-08-05 PROCEDURE — 97530 THERAPEUTIC ACTIVITIES: CPT

## 2025-08-05 NOTE — PROGRESS NOTES
Outpatient Rehab    Physical Therapy Visit    Patient Name: Branden Kaur  MRN: [id number]  YOB: 1965  Encounter Date: 8/5/2025    Therapy Diagnosis:   Encounter Diagnosis   Name Primary?    Impaired gait and mobility Yes     Physician: Mariela Newby MD    Physician Orders: Eval and Treat  Medical Diagnosis: CVA (cerebral vascular accident)  Impaired mobility and ADLs  Seizure disorder  Surgical Diagnosis: Not applicable for this Episode   Surgical Date: Not applicable for this Episode  Days Since Last Surgery: Not applicable for this Episode    Visit # / Visits Authorized:  2 / 10  Insurance Authorization Period: 7/9/2025 to 12/31/2025  Date of Evaluation: 7/22/2025  Plan of Care Certification:       PT/PTA:     Number of PTA visits since last PT visit:   Time In: 1530   Time Out: 1630  Total Time (in minutes): 60   Total Billable Time (in minutes):      FOTO:  Intake Score (%): Not applicable for this Episode  Survey Score 2 (%): Not applicable for this Episode  Survey Score 3 (%): Not applicable for this Episode    Precautions:         Subjective   Doing well; denies adverse response following last treatment, ready for therapy..         Objective            Treatment:  Therapeutic Exercise  TE 1: Sci fit 10 min level 2.5  Balance/Neuromuscular Re-Education  NMR 1: Hurdles fwd x 5  NMR 2: Hurdles 5 lateral  NMR 3: Large step taps all directions 3 x 10  NMR 4: Airex fwd/lateral step overs 10 each  NMR 5: Airex head rotation x 15  NMR 6: Airex cervical flexion/extension x 15  Therapeutic Activity  TA 1: Sit to stands x 24 low mat  TA 2: Modified plank 10 seconds x 10  TA 3: Stairs 5 laps    Time Entry(in minutes):  Neuromuscular Re-Education Time Entry: 30  Therapeutic Activity Time Entry: 15  Therapeutic Exercise Time Entry: 15    Assessment & Plan   Assessment: Progressed strength and mobility without adverse response; progress balance without AFO as tolerated moving forward with treatment.         The patient will continue to benefit from skilled outpatient physical therapy in order to address the deficits listed in the problem list on the initial evaluation, provide patient and family education, and maximize the patients level of independence in the home and community environments.     The patient's spiritual, cultural, and educational needs were considered, and the patient is agreeable to the plan of care and goals.           Plan:      Goals:   Active       LTG       Pt will improve FOTO.        Start:  07/22/25    Expected End:  09/02/25            Pt will be able to ambulate with no AD unlimited distance.        Start:  07/22/25    Expected End:  09/02/25            Pt will improve functional mobility with no fear of falling.       Start:  07/22/25    Expected End:  09/02/25               STG       Pt will be independent with HEP.        Start:  07/22/25    Expected End:  08/12/25            Pt will be able to improve sit <> stand within 30 seconds to at least 15.        Start:  07/22/25    Expected End:  08/12/25            Pt will be able to go up and down stairs step over step.        Start:  07/22/25    Expected End:  08/12/25                Vicente Scott, PT

## 2025-08-06 ENCOUNTER — CLINICAL SUPPORT (OUTPATIENT)
Dept: REHABILITATION | Facility: HOSPITAL | Age: 60
End: 2025-08-06
Payer: COMMERCIAL

## 2025-08-06 DIAGNOSIS — Z86.73 H/O: CVA (CEREBROVASCULAR ACCIDENT): Primary | ICD-10-CM

## 2025-08-06 PROCEDURE — 92507 TX SP LANG VOICE COMM INDIV: CPT

## 2025-08-06 NOTE — PROGRESS NOTES
Outpatient Rehab    Speech-Language Pathology Visit    Patient Name: Branden Kaur  MRN: [id number]  YOB: 1965  Encounter Date: 8/6/2025    Therapy Diagnosis:   Encounter Diagnosis   Name Primary?    H/O: CVA (cerebrovascular accident) Yes     Physician: Mariela Newby MD    Physician Orders: Eval and Treat  Medical Diagnosis: CVA (cerebral vascular accident)  Impaired mobility and ADLs  Seizure disorder  Surgical Diagnosis: Not applicable for this Episode   Surgical Date: Not applicable for this Episode  Days Since Last Surgery: Not applicable for this Episode    Visit # / Visits Authorized: 4 / 10   Insurance Authorization Period: 7/9/2025 to 12/31/2025  Date of Evaluation: 7/9/2025   Plan of Care Certification: 7/9/2025 to 9/3/2025      Time In: 1545   Time Out: 1630  Total Time (in minutes): 45   Total Billable Time (in minutes): 45      Subjective   Patient agreeable and cooperative during the session. Wife remained in waiting room. He reports no changes in communication or cognitive status. Patient reports continued compliance with homework as assigned and reading one chapter per day out loud with compensatory speech strategies. Denies any swallowing difficulties or concerns. Bottom dentures in place during session..  Pain reported as 0/10.        Objective            Treatment  Speech  Activity 1: Oral Motor Exercises  Activity 2: Articulation hiearchy drill work (sh/ch/sk blends)  Activity 3: Paragraph reading with compensatory strategies  Activity 4: Self assessment    Time Entry(in minutes):  Speech Treatment (Individual) Time Entry: 45    Assessment & Plan   Assessment  Patient improved articulatory precision in targeted speech sounds (/sh/, /ch/, sk blends) to increase speech intelligibility to at least 80% during structured tasks, as measured by clinician ratings. Patient implemented compensatory communication strategies (e.g., slowing rate, over-articulation) to enhance  intelligibility during spontaneous speech with minimal verbal clinician cues. Patient has maintained safe swallowing by consistently employing compensatory strategies during oral intake, as reported by patient.  Evaluation/Treatment Tolerance: Patient tolerated treatment well    The patient will continue to benefit from skilled outpatient speech therapy in order to address the deficits listed in the problem list on the initial evaluation, provide patient and family education, and maximize the patients level of independence in the home and community environments.     The patient's spiritual, cultural, and educational needs were considered, and the patient is agreeable to the plan of care and goals.     Education  Education was done with Patient. The patient's learning style includes Listening and Demonstration. The patient Demonstrates understanding.         Patient to continue practicing AMR/SMR, articulatory hierarchy drills, scripted conversation, and paragraphs targeting speech sounds addressed in therapy. Patient is to implement s/z cues (retracting lips) and articulatory compensatory strategies (slow rate, over articulate). Patient is to implement self rating scale (1-5) with goal of average score of 4 during daily practice. During daily practice, patient is to utilize mirror to monitor facial and labial symmetry when practicing (sh, delmi, sk) and all other assigned speech drills. Patient to practice oral motor exercises 3x per day.         Plan  Continue current plan of care. Patient is to continue wearing bottom dentures during next treatment session.  Diet Recommendation: Oral diet  Slow feeding rate, small single bites/sips    Goals:   Active       Long Term Goals       1. Patient will achieve functional speech intelligibility of 95% or higher during conversational speech with unfamiliar listeners in clinical and community settings. (Progressing)       Start:  07/09/25    Expected End:  09/03/25             2. Patient will demonstrate independent use of self-monitoring techniques and communication repair strategies to maintain effective communication. (Progressing)       Start:  07/09/25    Expected End:  09/03/25            3. Patient will continue to maintain safe swallowing status with no signs of aspiration or compensatory strategy breakdown. (Met)       Start:  07/09/25    Expected End:  09/03/25    Resolved:  07/30/25            Short Term Goals        1. Patient will improve articulatory precision in targeted speech sounds (/s/, /z/, /ch, sh/) and consonant blends to increase speech intelligibility to at least 90% during structured tasks, as measured by clinician ratings. (Progressing)       Start:  07/09/25    Expected End:  09/03/25            2. Patient will demonstrate improved coordination of alternating oral movements (e.g., AMRs and SMRs) with reduced effort and increased accuracy in 4 out of 5 trials. (Met)       Start:  07/09/25    Expected End:  09/03/25    Resolved:  07/25/25         3. Patient will implement compensatory communication strategies (e.g., slowing rate, over-articulation) to enhance intelligibility during spontaneous speech with minimal clinician cues. (Progressing)       Start:  07/09/25    Expected End:  09/03/25            4. Patient will maintain safe swallowing by consistently employing compensatory strategies during oral intake, as reported by patient and observed by clinician. (Met)       Start:  07/09/25    Expected End:  09/03/25    Resolved:  07/30/25             Leigha Kate CCC-SLP

## 2025-08-11 ENCOUNTER — CLINICAL SUPPORT (OUTPATIENT)
Dept: REHABILITATION | Facility: HOSPITAL | Age: 60
End: 2025-08-11
Payer: COMMERCIAL

## 2025-08-11 DIAGNOSIS — R29.898 RIGHT HAND WEAKNESS: ICD-10-CM

## 2025-08-11 DIAGNOSIS — Z78.9 DECREASED ACTIVITIES OF DAILY LIVING (ADL): Primary | ICD-10-CM

## 2025-08-11 DIAGNOSIS — R26.89 DECREASED FUNCTIONAL MOBILITY: ICD-10-CM

## 2025-08-11 DIAGNOSIS — R29.898 RIGHT ARM WEAKNESS: ICD-10-CM

## 2025-08-11 PROCEDURE — 97110 THERAPEUTIC EXERCISES: CPT

## 2025-08-11 PROCEDURE — 97530 THERAPEUTIC ACTIVITIES: CPT

## 2025-08-11 PROCEDURE — 97112 NEUROMUSCULAR REEDUCATION: CPT

## 2025-08-13 ENCOUNTER — CLINICAL SUPPORT (OUTPATIENT)
Dept: REHABILITATION | Facility: HOSPITAL | Age: 60
End: 2025-08-13
Payer: COMMERCIAL

## 2025-08-13 DIAGNOSIS — R29.898 RIGHT ARM WEAKNESS: ICD-10-CM

## 2025-08-13 DIAGNOSIS — R26.89 DECREASED FUNCTIONAL MOBILITY: ICD-10-CM

## 2025-08-13 DIAGNOSIS — R26.89 IMPAIRED GAIT AND MOBILITY: Primary | ICD-10-CM

## 2025-08-13 DIAGNOSIS — Z78.9 DECREASED ACTIVITIES OF DAILY LIVING (ADL): Primary | ICD-10-CM

## 2025-08-13 DIAGNOSIS — R29.898 RIGHT HAND WEAKNESS: ICD-10-CM

## 2025-08-13 PROCEDURE — 97530 THERAPEUTIC ACTIVITIES: CPT

## 2025-08-13 PROCEDURE — 97110 THERAPEUTIC EXERCISES: CPT

## 2025-08-13 PROCEDURE — 97112 NEUROMUSCULAR REEDUCATION: CPT

## 2025-08-14 ENCOUNTER — OFFICE VISIT (OUTPATIENT)
Dept: FAMILY MEDICINE | Facility: CLINIC | Age: 60
End: 2025-08-14
Payer: COMMERCIAL

## 2025-08-14 VITALS
SYSTOLIC BLOOD PRESSURE: 102 MMHG | OXYGEN SATURATION: 96 % | WEIGHT: 154 LBS | BODY MASS INDEX: 22.05 KG/M2 | HEART RATE: 59 BPM | HEIGHT: 70 IN | DIASTOLIC BLOOD PRESSURE: 84 MMHG

## 2025-08-14 DIAGNOSIS — R29.898 RIGHT ARM WEAKNESS: Primary | ICD-10-CM

## 2025-08-14 DIAGNOSIS — R22.31 LOCALIZED SWELLING ON RIGHT HAND: ICD-10-CM

## 2025-08-14 PROCEDURE — 99999 PR PBB SHADOW E&M-EST. PATIENT-LVL III: CPT | Mod: PBBFAC,,, | Performed by: FAMILY MEDICINE

## 2025-08-15 ENCOUNTER — TELEPHONE (OUTPATIENT)
Dept: FAMILY MEDICINE | Facility: CLINIC | Age: 60
End: 2025-08-15
Payer: COMMERCIAL

## 2025-08-18 ENCOUNTER — CLINICAL SUPPORT (OUTPATIENT)
Dept: REHABILITATION | Facility: HOSPITAL | Age: 60
End: 2025-08-18
Payer: COMMERCIAL

## 2025-08-18 DIAGNOSIS — R29.898 RIGHT HAND WEAKNESS: ICD-10-CM

## 2025-08-18 DIAGNOSIS — R26.89 IMPAIRED GAIT AND MOBILITY: Primary | ICD-10-CM

## 2025-08-18 DIAGNOSIS — R26.89 DECREASED FUNCTIONAL MOBILITY: ICD-10-CM

## 2025-08-18 DIAGNOSIS — R29.898 RIGHT ARM WEAKNESS: ICD-10-CM

## 2025-08-18 DIAGNOSIS — Z78.9 DECREASED ACTIVITIES OF DAILY LIVING (ADL): Primary | ICD-10-CM

## 2025-08-18 PROCEDURE — 97112 NEUROMUSCULAR REEDUCATION: CPT

## 2025-08-18 PROCEDURE — 97530 THERAPEUTIC ACTIVITIES: CPT

## 2025-08-18 PROCEDURE — 97110 THERAPEUTIC EXERCISES: CPT

## 2025-08-19 RX ORDER — CLOPIDOGREL BISULFATE 75 MG/1
75 TABLET ORAL DAILY
Qty: 90 TABLET | Refills: 1 | Status: SHIPPED | OUTPATIENT
Start: 2025-08-19

## 2025-08-20 ENCOUNTER — CLINICAL SUPPORT (OUTPATIENT)
Dept: REHABILITATION | Facility: HOSPITAL | Age: 60
End: 2025-08-20
Payer: COMMERCIAL

## 2025-08-20 ENCOUNTER — HOSPITAL ENCOUNTER (OUTPATIENT)
Dept: RADIOLOGY | Facility: HOSPITAL | Age: 60
Discharge: HOME | End: 2025-08-20
Attending: FAMILY MEDICINE
Payer: COMMERCIAL

## 2025-08-20 DIAGNOSIS — R26.89 IMPAIRED GAIT AND MOBILITY: Primary | ICD-10-CM

## 2025-08-20 DIAGNOSIS — R22.31 LOCALIZED SWELLING ON RIGHT HAND: ICD-10-CM

## 2025-08-20 PROCEDURE — 97164 PT RE-EVAL EST PLAN CARE: CPT

## 2025-08-20 PROCEDURE — 93971 EXTREMITY STUDY: CPT | Mod: TC,RT

## 2025-08-20 PROCEDURE — 93971 EXTREMITY STUDY: CPT | Mod: 26,RT,, | Performed by: RADIOLOGY

## 2025-08-20 PROCEDURE — 97110 THERAPEUTIC EXERCISES: CPT

## 2025-08-20 PROCEDURE — 97112 NEUROMUSCULAR REEDUCATION: CPT

## 2025-08-22 ENCOUNTER — CLINICAL SUPPORT (OUTPATIENT)
Dept: REHABILITATION | Facility: HOSPITAL | Age: 60
End: 2025-08-22
Payer: COMMERCIAL

## 2025-08-22 DIAGNOSIS — R29.898 RIGHT HAND WEAKNESS: ICD-10-CM

## 2025-08-22 DIAGNOSIS — Z78.9 DECREASED ACTIVITIES OF DAILY LIVING (ADL): Primary | ICD-10-CM

## 2025-08-22 DIAGNOSIS — R29.898 RIGHT ARM WEAKNESS: ICD-10-CM

## 2025-08-22 DIAGNOSIS — R26.89 DECREASED FUNCTIONAL MOBILITY: ICD-10-CM

## 2025-08-22 PROCEDURE — 97112 NEUROMUSCULAR REEDUCATION: CPT

## 2025-08-22 PROCEDURE — 97530 THERAPEUTIC ACTIVITIES: CPT

## 2025-08-22 PROCEDURE — 97110 THERAPEUTIC EXERCISES: CPT

## 2025-08-25 ENCOUNTER — CLINICAL SUPPORT (OUTPATIENT)
Dept: REHABILITATION | Facility: HOSPITAL | Age: 60
End: 2025-08-25
Payer: COMMERCIAL

## 2025-08-25 DIAGNOSIS — R29.898 RIGHT HAND WEAKNESS: ICD-10-CM

## 2025-08-25 DIAGNOSIS — R26.89 DECREASED FUNCTIONAL MOBILITY: ICD-10-CM

## 2025-08-25 DIAGNOSIS — R26.89 IMPAIRED GAIT AND MOBILITY: Primary | ICD-10-CM

## 2025-08-25 DIAGNOSIS — R29.898 RIGHT ARM WEAKNESS: ICD-10-CM

## 2025-08-25 DIAGNOSIS — Z78.9 DECREASED ACTIVITIES OF DAILY LIVING (ADL): Primary | ICD-10-CM

## 2025-08-25 PROCEDURE — 97530 THERAPEUTIC ACTIVITIES: CPT

## 2025-08-25 PROCEDURE — 97112 NEUROMUSCULAR REEDUCATION: CPT

## 2025-08-25 PROCEDURE — 97110 THERAPEUTIC EXERCISES: CPT

## 2025-08-26 ENCOUNTER — CLINICAL SUPPORT (OUTPATIENT)
Dept: REHABILITATION | Facility: HOSPITAL | Age: 60
End: 2025-08-26
Payer: COMMERCIAL

## 2025-08-26 DIAGNOSIS — Z86.73 H/O: CVA (CEREBROVASCULAR ACCIDENT): Primary | ICD-10-CM

## 2025-08-26 PROCEDURE — 92507 TX SP LANG VOICE COMM INDIV: CPT

## 2025-08-27 ENCOUNTER — CLINICAL SUPPORT (OUTPATIENT)
Dept: REHABILITATION | Facility: HOSPITAL | Age: 60
End: 2025-08-27
Payer: COMMERCIAL

## 2025-08-27 DIAGNOSIS — R26.89 IMPAIRED GAIT AND MOBILITY: Primary | ICD-10-CM

## 2025-08-27 DIAGNOSIS — R29.898 RIGHT ARM WEAKNESS: ICD-10-CM

## 2025-08-27 DIAGNOSIS — R26.89 DECREASED FUNCTIONAL MOBILITY: ICD-10-CM

## 2025-08-27 DIAGNOSIS — R29.898 RIGHT HAND WEAKNESS: ICD-10-CM

## 2025-08-27 DIAGNOSIS — Z78.9 DECREASED ACTIVITIES OF DAILY LIVING (ADL): Primary | ICD-10-CM

## 2025-08-27 PROCEDURE — 97112 NEUROMUSCULAR REEDUCATION: CPT

## 2025-08-27 PROCEDURE — 97530 THERAPEUTIC ACTIVITIES: CPT

## 2025-08-27 PROCEDURE — 97110 THERAPEUTIC EXERCISES: CPT

## 2025-09-02 ENCOUNTER — CLINICAL SUPPORT (OUTPATIENT)
Dept: REHABILITATION | Facility: HOSPITAL | Age: 60
End: 2025-09-02
Payer: COMMERCIAL

## 2025-09-02 DIAGNOSIS — R29.898 RIGHT HAND WEAKNESS: ICD-10-CM

## 2025-09-02 DIAGNOSIS — R26.89 DECREASED FUNCTIONAL MOBILITY: ICD-10-CM

## 2025-09-02 DIAGNOSIS — Z78.9 DECREASED ACTIVITIES OF DAILY LIVING (ADL): Primary | ICD-10-CM

## 2025-09-02 DIAGNOSIS — R29.898 RIGHT ARM WEAKNESS: ICD-10-CM

## 2025-09-02 PROCEDURE — 97530 THERAPEUTIC ACTIVITIES: CPT

## 2025-09-02 PROCEDURE — 97112 NEUROMUSCULAR REEDUCATION: CPT

## 2025-09-02 PROCEDURE — 97110 THERAPEUTIC EXERCISES: CPT

## 2025-09-03 ENCOUNTER — CLINICAL SUPPORT (OUTPATIENT)
Dept: REHABILITATION | Facility: HOSPITAL | Age: 60
End: 2025-09-03
Payer: COMMERCIAL

## 2025-09-03 DIAGNOSIS — Z78.9 DECREASED ACTIVITIES OF DAILY LIVING (ADL): Primary | ICD-10-CM

## 2025-09-03 DIAGNOSIS — R29.898 RIGHT HAND WEAKNESS: ICD-10-CM

## 2025-09-03 DIAGNOSIS — R29.898 RIGHT ARM WEAKNESS: ICD-10-CM

## 2025-09-03 DIAGNOSIS — R26.89 DECREASED FUNCTIONAL MOBILITY: ICD-10-CM

## 2025-09-03 PROCEDURE — 97530 THERAPEUTIC ACTIVITIES: CPT

## 2025-09-03 PROCEDURE — 97110 THERAPEUTIC EXERCISES: CPT

## 2025-09-03 PROCEDURE — 97112 NEUROMUSCULAR REEDUCATION: CPT

## 2025-09-04 ENCOUNTER — CLINICAL SUPPORT (OUTPATIENT)
Dept: REHABILITATION | Facility: HOSPITAL | Age: 60
End: 2025-09-04
Payer: COMMERCIAL

## 2025-09-04 DIAGNOSIS — R26.89 IMPAIRED GAIT AND MOBILITY: Primary | ICD-10-CM

## 2025-09-04 PROCEDURE — 97110 THERAPEUTIC EXERCISES: CPT

## 2025-09-04 PROCEDURE — 97112 NEUROMUSCULAR REEDUCATION: CPT

## 2025-09-04 PROCEDURE — 97530 THERAPEUTIC ACTIVITIES: CPT
